# Patient Record
Sex: MALE | Race: WHITE | Employment: OTHER | URBAN - METROPOLITAN AREA
[De-identification: names, ages, dates, MRNs, and addresses within clinical notes are randomized per-mention and may not be internally consistent; named-entity substitution may affect disease eponyms.]

---

## 2020-06-22 ENCOUNTER — APPOINTMENT (OUTPATIENT)
Dept: CT IMAGING | Age: 40
DRG: 885 | End: 2020-06-22
Payer: OTHER GOVERNMENT

## 2020-06-22 ENCOUNTER — HOSPITAL ENCOUNTER (INPATIENT)
Age: 40
LOS: 7 days | Discharge: HOME | DRG: 885 | End: 2020-06-29
Attending: EMERGENCY MEDICINE | Admitting: PSYCHIATRY & NEUROLOGY
Payer: OTHER GOVERNMENT

## 2020-06-22 PROBLEM — F29 PSYCHOSIS (HCC): Status: ACTIVE | Noted: 2020-06-22

## 2020-06-22 LAB
A/G RATIO: 2.2 (ref 1.1–2.2)
ACETAMINOPHEN LEVEL: <5 UG/ML (ref 10–30)
ALBUMIN SERPL-MCNC: 5 G/DL (ref 3.4–5)
ALP BLD-CCNC: 67 U/L (ref 40–129)
ALT SERPL-CCNC: 18 U/L (ref 10–40)
AMPHETAMINE SCREEN, URINE: ABNORMAL
ANION GAP SERPL CALCULATED.3IONS-SCNC: 14 MMOL/L (ref 3–16)
AST SERPL-CCNC: 23 U/L (ref 15–37)
BARBITURATE SCREEN URINE: ABNORMAL
BASOPHILS ABSOLUTE: 0 K/UL (ref 0–0.2)
BASOPHILS RELATIVE PERCENT: 0.3 %
BENZODIAZEPINE SCREEN, URINE: ABNORMAL
BILIRUB SERPL-MCNC: 0.6 MG/DL (ref 0–1)
BUN BLDV-MCNC: 7 MG/DL (ref 7–20)
CALCIUM SERPL-MCNC: 9.5 MG/DL (ref 8.3–10.6)
CANNABINOID SCREEN URINE: POSITIVE
CHLORIDE BLD-SCNC: 99 MMOL/L (ref 99–110)
CO2: 24 MMOL/L (ref 21–32)
COCAINE METABOLITE SCREEN URINE: ABNORMAL
CREAT SERPL-MCNC: 1 MG/DL (ref 0.9–1.3)
EOSINOPHILS ABSOLUTE: 0 K/UL (ref 0–0.6)
EOSINOPHILS RELATIVE PERCENT: 0.1 %
ETHANOL: NORMAL MG/DL (ref 0–0.08)
GFR AFRICAN AMERICAN: >60
GFR NON-AFRICAN AMERICAN: >60
GLOBULIN: 2.3 G/DL
GLUCOSE BLD-MCNC: 118 MG/DL (ref 70–99)
HCT VFR BLD CALC: 46.7 % (ref 40.5–52.5)
HEMOGLOBIN: 15.8 G/DL (ref 13.5–17.5)
LYMPHOCYTES ABSOLUTE: 1.1 K/UL (ref 1–5.1)
LYMPHOCYTES RELATIVE PERCENT: 10.7 %
Lab: ABNORMAL
MCH RBC QN AUTO: 29.7 PG (ref 26–34)
MCHC RBC AUTO-ENTMCNC: 33.8 G/DL (ref 31–36)
MCV RBC AUTO: 87.9 FL (ref 80–100)
METHADONE SCREEN, URINE: ABNORMAL
MONOCYTES ABSOLUTE: 0.9 K/UL (ref 0–1.3)
MONOCYTES RELATIVE PERCENT: 8.3 %
NEUTROPHILS ABSOLUTE: 8.6 K/UL (ref 1.7–7.7)
NEUTROPHILS RELATIVE PERCENT: 80.6 %
OPIATE SCREEN URINE: ABNORMAL
OXYCODONE URINE: ABNORMAL
PDW BLD-RTO: 14 % (ref 12.4–15.4)
PH UA: 6
PHENCYCLIDINE SCREEN URINE: ABNORMAL
PLATELET # BLD: 241 K/UL (ref 135–450)
PMV BLD AUTO: 8.3 FL (ref 5–10.5)
POTASSIUM REFLEX MAGNESIUM: 3.7 MMOL/L (ref 3.5–5.1)
PROPOXYPHENE SCREEN: ABNORMAL
RBC # BLD: 5.31 M/UL (ref 4.2–5.9)
SALICYLATE, SERUM: 0.4 MG/DL (ref 15–30)
SARS-COV-2, NAAT: NOT DETECTED
SODIUM BLD-SCNC: 137 MMOL/L (ref 136–145)
TOTAL PROTEIN: 7.3 G/DL (ref 6.4–8.2)
WBC # BLD: 10.7 K/UL (ref 4–11)

## 2020-06-22 PROCEDURE — 80307 DRUG TEST PRSMV CHEM ANLYZR: CPT

## 2020-06-22 PROCEDURE — 99285 EMERGENCY DEPT VISIT HI MDM: CPT

## 2020-06-22 PROCEDURE — U0002 COVID-19 LAB TEST NON-CDC: HCPCS

## 2020-06-22 PROCEDURE — 1240000000 HC EMOTIONAL WELLNESS R&B

## 2020-06-22 PROCEDURE — 84443 ASSAY THYROID STIM HORMONE: CPT

## 2020-06-22 PROCEDURE — 84439 ASSAY OF FREE THYROXINE: CPT

## 2020-06-22 PROCEDURE — 70450 CT HEAD/BRAIN W/O DYE: CPT

## 2020-06-22 PROCEDURE — G0480 DRUG TEST DEF 1-7 CLASSES: HCPCS

## 2020-06-22 PROCEDURE — 85025 COMPLETE CBC W/AUTO DIFF WBC: CPT

## 2020-06-22 PROCEDURE — 36415 COLL VENOUS BLD VENIPUNCTURE: CPT

## 2020-06-22 PROCEDURE — 80053 COMPREHEN METABOLIC PANEL: CPT

## 2020-06-22 PROCEDURE — 6370000000 HC RX 637 (ALT 250 FOR IP): Performed by: EMERGENCY MEDICINE

## 2020-06-22 RX ORDER — OLANZAPINE 5 MG/1
10 TABLET, ORALLY DISINTEGRATING ORAL ONCE
Status: COMPLETED | OUTPATIENT
Start: 2020-06-22 | End: 2020-06-22

## 2020-06-22 RX ADMIN — OLANZAPINE 10 MG: 5 TABLET, ORALLY DISINTEGRATING ORAL at 23:18

## 2020-06-23 PROBLEM — F12.10 CANNABIS ABUSE: Status: ACTIVE | Noted: 2020-06-23

## 2020-06-23 LAB
T4 FREE: 1.6 NG/DL (ref 0.9–1.8)
TSH SERPL DL<=0.05 MIU/L-ACNC: 4.29 UIU/ML (ref 0.27–4.2)

## 2020-06-23 PROCEDURE — 99223 1ST HOSP IP/OBS HIGH 75: CPT | Performed by: PSYCHIATRY & NEUROLOGY

## 2020-06-23 PROCEDURE — 1240000000 HC EMOTIONAL WELLNESS R&B

## 2020-06-23 PROCEDURE — 6370000000 HC RX 637 (ALT 250 FOR IP): Performed by: PSYCHIATRY & NEUROLOGY

## 2020-06-23 RX ORDER — IBUPROFEN 400 MG/1
400 TABLET ORAL EVERY 6 HOURS PRN
Status: DISCONTINUED | OUTPATIENT
Start: 2020-06-23 | End: 2020-06-23

## 2020-06-23 RX ORDER — OLANZAPINE 10 MG/1
10 TABLET ORAL
Status: COMPLETED | OUTPATIENT
Start: 2020-06-23 | End: 2020-06-23

## 2020-06-23 RX ORDER — TRAZODONE HYDROCHLORIDE 50 MG/1
50 TABLET ORAL NIGHTLY PRN
Status: DISCONTINUED | OUTPATIENT
Start: 2020-06-23 | End: 2020-06-23

## 2020-06-23 RX ORDER — OLANZAPINE 10 MG/1
10 INJECTION, POWDER, LYOPHILIZED, FOR SOLUTION INTRAMUSCULAR
Status: COMPLETED | OUTPATIENT
Start: 2020-06-23 | End: 2020-06-23

## 2020-06-23 RX ORDER — ACETAMINOPHEN 325 MG/1
650 TABLET ORAL EVERY 4 HOURS PRN
Status: DISCONTINUED | OUTPATIENT
Start: 2020-06-23 | End: 2020-06-29 | Stop reason: HOSPADM

## 2020-06-23 RX ORDER — LORAZEPAM 2 MG/1
2 TABLET ORAL ONCE
Status: COMPLETED | OUTPATIENT
Start: 2020-06-23 | End: 2020-06-23

## 2020-06-23 RX ORDER — MAGNESIUM HYDROXIDE/ALUMINUM HYDROXICE/SIMETHICONE 120; 1200; 1200 MG/30ML; MG/30ML; MG/30ML
30 SUSPENSION ORAL EVERY 6 HOURS PRN
Status: DISCONTINUED | OUTPATIENT
Start: 2020-06-23 | End: 2020-06-29 | Stop reason: HOSPADM

## 2020-06-23 RX ORDER — BENZTROPINE MESYLATE 1 MG/ML
2 INJECTION INTRAMUSCULAR; INTRAVENOUS 2 TIMES DAILY PRN
Status: DISCONTINUED | OUTPATIENT
Start: 2020-06-23 | End: 2020-06-29 | Stop reason: HOSPADM

## 2020-06-23 RX ORDER — LORAZEPAM 2 MG/1
2 TABLET ORAL EVERY 6 HOURS PRN
Status: DISCONTINUED | OUTPATIENT
Start: 2020-06-23 | End: 2020-06-29 | Stop reason: HOSPADM

## 2020-06-23 RX ORDER — ACETAMINOPHEN 325 MG/1
650 TABLET ORAL EVERY 4 HOURS PRN
Status: DISCONTINUED | OUTPATIENT
Start: 2020-06-23 | End: 2020-06-23

## 2020-06-23 RX ORDER — OLANZAPINE 10 MG/1
10 INJECTION, POWDER, LYOPHILIZED, FOR SOLUTION INTRAMUSCULAR 2 TIMES DAILY PRN
Status: DISCONTINUED | OUTPATIENT
Start: 2020-06-23 | End: 2020-06-29 | Stop reason: HOSPADM

## 2020-06-23 RX ORDER — OLANZAPINE 10 MG/1
10 TABLET ORAL EVERY 8 HOURS PRN
Status: DISCONTINUED | OUTPATIENT
Start: 2020-06-23 | End: 2020-06-29 | Stop reason: HOSPADM

## 2020-06-23 RX ORDER — PALIPERIDONE 3 MG/1
6 TABLET, EXTENDED RELEASE ORAL DAILY
Status: DISCONTINUED | OUTPATIENT
Start: 2020-06-23 | End: 2020-06-24

## 2020-06-23 RX ADMIN — PALIPERIDONE 6 MG: 3 TABLET, EXTENDED RELEASE ORAL at 15:20

## 2020-06-23 RX ADMIN — LORAZEPAM 2 MG: 2 TABLET ORAL at 15:20

## 2020-06-23 RX ADMIN — OLANZAPINE 10 MG: 10 TABLET, FILM COATED ORAL at 12:07

## 2020-06-23 ASSESSMENT — SLEEP AND FATIGUE QUESTIONNAIRES
RESTFUL SLEEP: NO
DO YOU HAVE DIFFICULTY SLEEPING: YES
SLEEP PATTERN: INSOMNIA
DIFFICULTY FALLING ASLEEP: YES
DIFFICULTY ARISING: NO
DIFFICULTY STAYING ASLEEP: YES
DO YOU USE A SLEEP AID: NO

## 2020-06-23 ASSESSMENT — LIFESTYLE VARIABLES: HISTORY_ALCOHOL_USE: YES

## 2020-06-23 ASSESSMENT — PAIN SCALES - GENERAL
PAINLEVEL_OUTOF10: 0
PAINLEVEL_OUTOF10: 0

## 2020-06-23 ASSESSMENT — PATIENT HEALTH QUESTIONNAIRE - PHQ9: SUM OF ALL RESPONSES TO PHQ QUESTIONS 1-9: 13

## 2020-06-23 NOTE — ED NOTES
Brought to the [facility] AN AFFILIATE OF [facility], patient is dressed in safety gown. Blood and urine specimens obtained and sent to lab for processing. Writer spoke with patient, patient with inappropriate laughter. He is, at times, able to answer questions about  background. Talks about, \"TJ\" as a person he, \"fears\", will look over to his right when he mentions TJ. Unable to obtain assessment due to patient is unwilling/unable to participate in such due to current mental status. States he is unaware of where he is. Denies he has any children, life partner, siblings, parents. Laughs at the beginning of question and after answering. Able to follow simple directions, though at times does require repeated requests. Patient denies that he wishes to harm self. He is unwilling/unable to provide any information about family contacts. Patient currently monitored for safety.      Gilda Dalton RN  06/22/20 8503       Gilda Dalton [facility]  06/22/20 1331

## 2020-06-23 NOTE — PROGRESS NOTES
Pt arrived to unit via wheelchair with security and [facility] AN AFFILIATE OF [facility] staff. No issues. Pt sits down and closes eyes. No s/s of distress. V.s.s. admission questions started.

## 2020-06-23 NOTE — PROGRESS NOTES
Recognizing danger situations (included triggers and roadblocks)                    ( )  Coping skills (new ways to manage stress, exercise, relaxation techniques, changing routine, distraction)                                                           ( )  Basic information about quitting (benefits of quitting, techniques in how to quit, available resources  ( ) Referral for counseling faxed to [facility]                                           ( ) Patient refused counseling  ( ) Patient has not smoked in the last 30 days    Metabolic Screening:    No results found for: LABA1C    No results found for: CHOL  No results found for: TRIG  No results found for: HDL  No components found for: LDLCAL  No results found for: LABVLDL      Body mass index is 30.26 kg/m². BP Readings from Last 2 Encounters:   06/23/20 (!) 146/80           Pt admitted with followings belongings:  Dentures: None  Vision - Corrective Lenses: None  Hearing Aid: None  Jewelry: None  Body Piercings Removed: No  Clothing: Pants, Footwear, Socks(belt)  Were All Patient Medications Collected?: Not Applicable  Other Valuables: [address] locker. .  Patient oriented to surroundings and program expectations and copy of patient rights given.  Received admission packet:  Consents reviewed, Patient unable to verbalize understanding   Patient education on precautions                 Chelsea Bangrua RN
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
## 2020-06-23 NOTE — ED PROVIDER NOTES
Patient has no known allergies. FAMILY HISTORY     History reviewed. No pertinent family history. SOCIAL HISTORY       Social History     Socioeconomic History    Marital status: Unknown     Spouse name: None    Number of children: None    Years of education: None    Highest education level: None   Occupational History    None   Social Needs    Financial resource strain: None    Food insecurity     Worry: None     Inability: None    Transportation needs     Medical: None     Non-medical: None   Tobacco Use    Smoking status: Never Smoker    Smokeless tobacco: Never Used    Tobacco comment: ROSALINO   Substance and Sexual Activity    Alcohol use: Not Currently     Comment: ROSALINO    Drug use: Not Currently     Comment: ROSALINO    Sexual activity: None     Comment: ROSALINO   Lifestyle    Physical activity     Days per week: None     Minutes per session: None    Stress: None   Relationships    Social connections     Talks on phone: None     Gets together: None     Attends Episcopalian service: None     Active member of club or organization: None     Attends meetings of clubs or organizations: None     Relationship status: None    Intimate partner violence     Fear of current or ex partner: None     Emotionally abused: None     Physically abused: None     Forced sexual activity: None   Other Topics Concern    None   Social History Narrative    None       SCREENINGS               PHYSICAL EXAM    (up to 7 for level 4, 8 or more for level 5)     ED Triage Vitals [06/22/20 2018]   BP Temp Temp Source Pulse Resp SpO2 Height Weight   (!) 132/109 99.5 °F (37.5 °C) Oral 110 16 98 % 5' 11.5\" (1.816 m) 220 lb (99.8 kg)       Physical Exam    General appearance: Lying in bed laughing and appropriately  Skin:  Warm. Dry. Eye:  Extraocular movements intact. Pupils are equally round and reactive to light and accommodation. Extraocular motions are intact. CN II-XII intact.   Ears, nose, mouth and throat:  Oral mucosa

## 2020-06-23 NOTE — BH NOTE
Pt sitting on the floor with his back against wall with eyes closed and rocking gently. Would not respond to writer. Informed nurse, will continue to monitor.
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
## 2020-06-24 PROBLEM — F31.13 BIPOLAR I DISORDER, CURRENT OR MOST RECENT EPISODE MANIC, SEVERE (HCC): Status: ACTIVE | Noted: 2020-06-22

## 2020-06-24 LAB
CHOLESTEROL, TOTAL: 116 MG/DL (ref 0–199)
ESTIMATED AVERAGE GLUCOSE: 108.3 MG/DL
HBA1C MFR BLD: 5.4 %
HDLC SERPL-MCNC: 41 MG/DL (ref 40–60)
LDL CHOLESTEROL CALCULATED: 57 MG/DL
TRIGL SERPL-MCNC: 92 MG/DL (ref 0–150)
VLDLC SERPL CALC-MCNC: 18 MG/DL

## 2020-06-24 PROCEDURE — 80061 LIPID PANEL: CPT

## 2020-06-24 PROCEDURE — 99221 1ST HOSP IP/OBS SF/LOW 40: CPT | Performed by: PHYSICIAN ASSISTANT

## 2020-06-24 PROCEDURE — 36415 COLL VENOUS BLD VENIPUNCTURE: CPT

## 2020-06-24 PROCEDURE — 97535 SELF CARE MNGMENT TRAINING: CPT

## 2020-06-24 PROCEDURE — 83036 HEMOGLOBIN GLYCOSYLATED A1C: CPT

## 2020-06-24 PROCEDURE — 1240000000 HC EMOTIONAL WELLNESS R&B

## 2020-06-24 PROCEDURE — 99233 SBSQ HOSP IP/OBS HIGH 50: CPT | Performed by: PSYCHIATRY & NEUROLOGY

## 2020-06-24 PROCEDURE — 6370000000 HC RX 637 (ALT 250 FOR IP): Performed by: PSYCHIATRY & NEUROLOGY

## 2020-06-24 PROCEDURE — 97165 OT EVAL LOW COMPLEX 30 MIN: CPT

## 2020-06-24 RX ORDER — LITHIUM CARBONATE 450 MG
450 TABLET, EXTENDED RELEASE ORAL EVERY 12 HOURS SCHEDULED
Status: DISCONTINUED | OUTPATIENT
Start: 2020-06-24 | End: 2020-06-29 | Stop reason: HOSPADM

## 2020-06-24 RX ORDER — LITHIUM CARBONATE 450 MG
450 TABLET, EXTENDED RELEASE ORAL EVERY 12 HOURS SCHEDULED
Status: DISCONTINUED | OUTPATIENT
Start: 2020-06-24 | End: 2020-06-24

## 2020-06-24 RX ORDER — QUETIAPINE 200 MG/1
200 TABLET, FILM COATED, EXTENDED RELEASE ORAL NIGHTLY
Status: DISCONTINUED | OUTPATIENT
Start: 2020-06-24 | End: 2020-06-29 | Stop reason: HOSPADM

## 2020-06-24 RX ADMIN — PALIPERIDONE 6 MG: 3 TABLET, EXTENDED RELEASE ORAL at 07:38

## 2020-06-24 ASSESSMENT — PAIN SCALES - GENERAL
PAINLEVEL_OUTOF10: 0
PAINLEVEL_OUTOF10: 0

## 2020-06-24 NOTE — PROGRESS NOTES
PCP, Psychiatrist, or therapist.  Social Network:  Couple of friends  Stressors:  1. People not doing what they are suppose to. \"When the Mosque doesn't do what they are suppose to do it stresses me out. \"  2.  People on placing honor. 3.  Not being able to feel loved. Coping Skills:  Writing, singing    Pain  []Yes  [x]No  Rating:  Location:  Pain Medicine Status: [] Denies need  [] Pain med requested  [] RN notified. Cognition    A&Ox person, place, date. Disoriented to why he's in the hospital.  Patient cooperative. Follows []1 step and [x] 3 step commands. Decreased insight into limitations. Decreased awareness of errors. Decreased safety awareness. Upper Extremity ROM:    WFL, pt able to perform all bed mobility, transfers, and gait without ROM limitation. Upper Extremity Strength:    WFL, pt able to perform all bed mobility, transfers, and gait without strength limitation. Upper Extremity Sensation:    WFL    Upper Extremity Proprioception:    No apparent deficits. Skin Integrity:  WFL     Coordination and Tone:  WFL    Balance  Static Sitting:  [x] Good [] Fair [] Poor  Dynamic Sitting:  [x] Good [] Fair [] Poor  Static Standing: [x] Good [] Fair [] Poor  Dynamic Standing: [x] Good [] Fair [] Poor    Bed mobility:  Independent  Supine to sit:  Sit to supine:  Scooting to head of bed:  Scooting in sitting:  Rolling:  Bridging:    Transfers:   Independent  Sit to stand:  Stand to sit:  Bed/Chair to/from toilet:    Self Care: Toileting:  Independent  Grooming:    Dressing:    Exercise / Activities Initiated:   Pt. Educated on role of OT. Pt. Participated in:  Eval, ADL retraining, medication management, education on how to control the water for showering/washing hands, and sleep hygiene handouts/education. Assessment of Deficits:   Pt demonstrated decreased activity tolerance, decreased safety awareness, decreased cognition and decreased ADL/IADL status.     Pt. Limited during evaluation by decreased cognition. At end of evaluation, pt. In room. Goal(s) : To be met in 3 Visits:  1). Pt. To complete ACLS. 2). Pt. To verbalize understanding of sleep hygiene education. To be met in 5 Visits:  1). Pt. To complete 1 SMART long term goal and 2 SMART short term goals with mod assist.  2). Pt. To verbalize 3 new coping skills. 3). Pt. To complete interest check list.    4). Pt. To verbalize understanding of 3 communication styles. 5). Pt. To complete wellness plan. 6). Pt. To complete a daily schedule of healthy activities/routines with mod assist.   7). Pt. To identify 2 memory strategies to take medications as prescribed. Rehabilitation Potential:  Good for goals listed above. Strengths for achieving goals include:  PLOF  Barriers to achieving goals include:  Decreased Cognition     Plan: To be seen 2-5x/week while in acute care setting for therapeutic exercises/act, ADL retraining, NMR and patient/caregiver ed/instruction.      Timed Code Treatment Minutes:   24  minutes    Total Treatment Time:    34  minutes    Signature and License Number    OLVIN Perez/L  #[id number]        If patient discharges from this facility prior to next visit, this note will serve as the Discharge Summary

## 2020-06-24 NOTE — GROUP NOTE
Group Therapy Note    Date: 6/24/2020    Group Start Time: 1000  Group End Time: 7320  Group Topic: [address]        Group Therapy Note    Attendees: 11    Patient's Goal: to engage in Mood Elevation intervention by actively listening to music and expressing reactions to songs utilized to elevate mood, increase positive coping skills, increase emotional awareness, and increase mood regulation. Notes: Alfred Oro actively participated in group while in attendance. Pt expressed reactions to music utilized. Alfred Oro was pulled from group early and did not return. Status After Intervention:  Unchanged    Participation Level:  Active Listener and Interactive    Participation Quality: Appropriate, Attentive and Sharing      Speech:  normal      Thought Process/Content: Linear      Affective Functioning: Constricted/Restricted      Mood: anxious and depressed      Level of consciousness:  Alert and Attentive      Response to Learning: Able to verbalize current knowledge/experience and Progressing to goal      Endings: None Reported    Modes of Intervention: Education, Support, Clarifying, Problem-solving, Activity and Media      Discipline Responsible: Psychoeducational Specialist      Signature:  TINA Johnson

## 2020-06-25 PROCEDURE — 97530 THERAPEUTIC ACTIVITIES: CPT

## 2020-06-25 PROCEDURE — 99233 SBSQ HOSP IP/OBS HIGH 50: CPT | Performed by: PSYCHIATRY & NEUROLOGY

## 2020-06-25 PROCEDURE — 6370000000 HC RX 637 (ALT 250 FOR IP): Performed by: PSYCHIATRY & NEUROLOGY

## 2020-06-25 PROCEDURE — 1240000000 HC EMOTIONAL WELLNESS R&B

## 2020-06-25 PROCEDURE — 97535 SELF CARE MNGMENT TRAINING: CPT

## 2020-06-25 RX ADMIN — QUETIAPINE FUMARATE 200 MG: 200 TABLET, EXTENDED RELEASE ORAL at 21:46

## 2020-06-25 RX ADMIN — LITHIUM CARBONATE 450 MG: 450 TABLET ORAL at 21:47

## 2020-06-25 RX ADMIN — LITHIUM CARBONATE 450 MG: 450 TABLET ORAL at 08:38

## 2020-06-25 ASSESSMENT — PAIN SCALES - GENERAL
PAINLEVEL_OUTOF10: 0
PAINLEVEL_OUTOF10: 0

## 2020-06-25 NOTE — BH NOTE
[address] [facility]  Day 3 Interdisciplinary Treatment Plan NOTE    Review Date & Time: 06/25/2020 0900    Patient was not in treatment team    Admission Type:   Admission Type: Probate    Reason for admission:     Estimated Length of Stay Update:  1-3 days   Estimated Discharge Date Update: 1-3 days     PATIENT STRENGTHS:  Patient Strengths Strengths: (ROSALINO just smiled when therapist asked him the question)  Patient Strengths and Limitations:Limitations: (ROSALINO just smiled when therapist asked him the question)  Addictive Behavior:Addictive Behavior  In the past 3 months, have you felt or has someone told you that you have a problem with:  : (ROSALINO just smiled when therapist asked him the question)  Do you have a history of Chemical Use?: (ROSALINO )  Do you have a history of Alcohol Use?: (ROSALINO )  Do you have a history of Street Drug Abuse?: (ROSALINO )  Histroy of Prescripton Drug Abuse?: (ROSALINO)  Medical Problems:History reviewed. No pertinent past medical history. Risk:  Fall RiskTotal: 53  Lloyd Scale Lloyd Scale Score: 22  BVC Total: 1  Change in scores NO.  Changes to plan of Care  NO    Status EXAM:   Status and Exam  Normal: No  Facial Expression: Exaggerated  Affect: Inappropriate, Unstable, Constricted  Level of Consciousness: Alert  Mood:Normal: No  Mood: Anxious, Labile, Suspicious, Irritable  Motor Activity:Normal: Yes  Motor Activity: Decreased  Interview Behavior: Impulsive, Irritable, Uncooperative/Withdrawn  Preception: Henderson to Person, Henderson to Time, Henderson to Place, Henderson to Situation  Attention:Normal: No  Attention: Hyperalert  Thought Processes: Circumstantial  Thought Content:Normal: No  Thought Content: Paranoia  Hallucinations: None, Other (Comment)(denies)  Delusions: Yes  Delusions: Persecution, Other(See Comment)(Sikh preoccupation.)  Memory:Normal: No  Memory: Poor Remote, Poor Recent  Insight and Judgment: No  Insight and Judgment: Poor Insight, Poor Judgment  Present Suicidal Ideation: No  Present Homicidal Ideation: No    Daily Assessment Last Entry:   Daily Sleep (WDL): Exceptions to WDL         Patient Currently in Pain: Denies  Daily Nutrition (WDL): Within Defined Limits    Patient Monitoring:  Frequency of Checks: 4 times per hour, close    Psychiatric Symptoms:   Depression Symptoms  Depression Symptoms: Isolative, Loss of interest, Increased irritability  Anxiety Symptoms  Anxiety Symptoms: Generalized, Obsessions  Antonia Symptoms  Antonia Symptoms: Poor judgment     Psychosis Symptoms  Delusion Type: Paranoid, Jainism    Suicide Risk CSSR-S:  1) Within the past month, have you wished you were dead or wished you could go to sleep and not wake up? : No  2) Have you actually had any thoughts of killing yourself? : No  6) Have you ever done anything, started to do anything, or prepared to do anything to end your life?: No  Change in Result NO Change in Plan of care NO      EDUCATION:   Learner Progress Toward Treatment Goals: Reviewed results and recommendations of this team    Method: Individual    Outcome: Verbalized understanding    PATIENT GOALS: \" to go home\"     PLAN/TREATMENT RECOMMENDATIONS UPDATE: maintain safety, medication management     GOALS UPDATE:   Time frame for Short-Term Goals:  By time of discharge       Emil Cedeno RN

## 2020-06-25 NOTE — GROUP NOTE
Group Therapy Note    Date: 6/25/2020    Group Start Time: 1000  Group End Time: 1100  [facility]        Group Therapy Note    Attendees: 10         Patient's Goal:  Patient will complete worksheet on Dependency and will discuss how over-reliance on others' approval effects mental health. Notes:  Patient attended group. Completed the worksheet and discussed in group. Talked about his tendency to seek approval of others at the expense of patients own values and beliefs. Gave an example of how this effected mental health. Received feedback from peers. Status After Intervention:  Improved    Participation Level:  Active Listener and Interactive    Participation Quality: Appropriate and Attentive    Speech:  normal    Thought Process/Content: Logical    Affective Functioning: Congruent    Mood: anxious, depressed     Level of consciousness:  Oriented x4    Response to Learning: Able to verbalize current knowledge/experience and Able to verbalize/acknowledge new learning    Endings: None Reported    Modes of Intervention: Education, Support, Socialization and Exploration    Discipline Responsible: /Counselor    Signature:  Bennett Waldron [facility]

## 2020-06-26 PROCEDURE — 6370000000 HC RX 637 (ALT 250 FOR IP): Performed by: PSYCHIATRY & NEUROLOGY

## 2020-06-26 PROCEDURE — 1240000000 HC EMOTIONAL WELLNESS R&B

## 2020-06-26 PROCEDURE — 99233 SBSQ HOSP IP/OBS HIGH 50: CPT | Performed by: PSYCHIATRY & NEUROLOGY

## 2020-06-26 RX ADMIN — LITHIUM CARBONATE 450 MG: 450 TABLET ORAL at 21:11

## 2020-06-26 RX ADMIN — LITHIUM CARBONATE 450 MG: 450 TABLET ORAL at 08:37

## 2020-06-26 RX ADMIN — QUETIAPINE FUMARATE 200 MG: 200 TABLET, EXTENDED RELEASE ORAL at 21:10

## 2020-06-26 NOTE — GROUP NOTE
Group Therapy Note    Date: 6/25/2020    Group Start Time: 2050  Group End Time: 2115  Group Topic: Franchesca Vega, RN        Group Therapy Note    Attendees: Spoke about goals and rules for the unit. Patient's Goal:  To read thirty pages. Notes:  Pleasant and cooperative during group    Status After Intervention:  Unchanged    Participation Level:  Active Listener and Interactive    Participation Quality: Appropriate, Attentive and Sharing      Speech:  normal      Thought Process/Content: Logical  Linear      Affective Functioning: Incongruent      Mood: anxious      Level of consciousness:  Preoccupied      Response to Learning: Able to verbalize current knowledge/experience, Able to verbalize/acknowledge new learning and Able to retain information      Endings: None Reported    Modes of Intervention: Education, Support and Socialization      Discipline Responsible: Registered Nurse      Signature:  Jason Billingsley RN

## 2020-06-26 NOTE — PROGRESS NOTES
presents with severe psychosis  including disorganized thinking, agitation, paranoia, delusional  thought, and likely hallucinations as evidenced by response to internal  stimuli. His insight and judgment are impaired. He requires inpatient  stabilization and treatment. Given his presentation on the unit and further history, he mostly likely has Bipolar I disorder. Principal Problem:    Bipolar I disorder, current or most recent episode manic, severe ([address]  Active Problems:    Cannabis abuse    Psychosis ([facility] [address]  Resolved Problems:    * No resolved hospital problems. *     PLAN:  1. Inpatient admission for stabilization and treatment. 2.  On admission, started Invega 6 mg p.o. daily for treatment of psychosis. Ordered  every 15-minute checks for safety, programming, and p.r.n. medication  for anxiety, agitation and insomnia. 6/24/2020 - DC invega. Start Lithium 450 mg BID and Quetiapine 200mg QHS for mood stabilization. 3.  Internal Medicine consult for admission. No additional findings. 4.  Collateral information if possible for diagnostic clarification and  care coordination. 5.  The patient was admitted on a Statement of Belief for stabilization and placed on a hold. His hold expires Friday. Total time with patient was 35 minutes and more than 50 % of that time was spent counseling the patient on their symptoms, treatment, and expected goals.        Danitza Daniels MD, [facility] [address] [place],Third Floor, [place]

## 2020-06-26 NOTE — GROUP NOTE
Group Therapy Note    Date: 6/26/2020    Group Start Time: 5250  Group End Time: 6696  Group Topic: Healthy Living/Wellness    [address] [place] [address]        Group Therapy Note    Attendees: 8    Patient's Goal: to engage in a [employer] Class to improve self-care, mood, self-esteem, and overall health, wellness, and quality of life. To complete a self-care assessment worksheet and identify ways to improve overall wellness. Notes: Felisha Jett actively engaged in a [employer] Class and completed all exercise directives. Felisha Jett completed a self-care assessment worksheet. Felisha Jett actively listened and contributed to processing discussion. Status After Intervention:  Improved    Participation Level:  Active Listener and Interactive    Participation Quality: Appropriate, Attentive, Sharing and Supportive      Speech:  normal      Thought Process/Content: Logical      Affective Functioning: Congruent      Mood: euthymic      Level of consciousness:  Alert, Oriented x4 and Attentive      Response to Learning: Able to verbalize current knowledge/experience, Able to verbalize/acknowledge new learning and Progressing to goal      Endings: None Reported    Modes of Intervention: Education, Support, Socialization, Exploration, Clarifying, Problem-solving, Activity and Movement      Discipline Responsible: Psychoeducational Specialist      Signature:  Yasmine Rodgers [place]

## 2020-06-27 PROCEDURE — 6370000000 HC RX 637 (ALT 250 FOR IP): Performed by: PSYCHIATRY & NEUROLOGY

## 2020-06-27 PROCEDURE — 1240000000 HC EMOTIONAL WELLNESS R&B

## 2020-06-27 RX ORDER — PANTOPRAZOLE SODIUM 40 MG/1
40 TABLET, DELAYED RELEASE ORAL
Status: DISCONTINUED | OUTPATIENT
Start: 2020-06-27 | End: 2020-06-29 | Stop reason: HOSPADM

## 2020-06-27 RX ADMIN — LITHIUM CARBONATE 450 MG: 450 TABLET ORAL at 20:15

## 2020-06-27 RX ADMIN — QUETIAPINE FUMARATE 200 MG: 200 TABLET, EXTENDED RELEASE ORAL at 20:15

## 2020-06-27 RX ADMIN — ACETAMINOPHEN 650 MG: 325 TABLET ORAL at 08:59

## 2020-06-27 RX ADMIN — PANTOPRAZOLE SODIUM 40 MG: 40 TABLET, DELAYED RELEASE ORAL at 16:42

## 2020-06-27 RX ADMIN — LITHIUM CARBONATE 450 MG: 450 TABLET ORAL at 08:56

## 2020-06-27 ASSESSMENT — PAIN SCALES - GENERAL: PAINLEVEL_OUTOF10: 4

## 2020-06-27 NOTE — PROGRESS NOTES
Comments: + interactions, + contribution, and + engagement.   Met goal       Time: 0839-2002      Type of Group: wrap-up/relaxation      Level of Participation: 5/16

## 2020-06-28 PROCEDURE — 6370000000 HC RX 637 (ALT 250 FOR IP): Performed by: PSYCHIATRY & NEUROLOGY

## 2020-06-28 PROCEDURE — 99222 1ST HOSP IP/OBS MODERATE 55: CPT | Performed by: PSYCHIATRY & NEUROLOGY

## 2020-06-28 PROCEDURE — 1240000000 HC EMOTIONAL WELLNESS R&B

## 2020-06-28 RX ADMIN — QUETIAPINE FUMARATE 200 MG: 200 TABLET, EXTENDED RELEASE ORAL at 20:45

## 2020-06-28 RX ADMIN — PANTOPRAZOLE SODIUM 40 MG: 40 TABLET, DELAYED RELEASE ORAL at 16:36

## 2020-06-28 RX ADMIN — PANTOPRAZOLE SODIUM 40 MG: 40 TABLET, DELAYED RELEASE ORAL at 06:47

## 2020-06-28 RX ADMIN — LITHIUM CARBONATE 450 MG: 450 TABLET ORAL at 08:59

## 2020-06-28 RX ADMIN — LITHIUM CARBONATE 450 MG: 450 TABLET ORAL at 20:45

## 2020-06-28 NOTE — PLAN OF CARE
Problem: Anger Management/Homicidal Ideation:  Goal: Absence of angry outbursts  Description: Absence of angry outbursts  Outcome: Ongoing   Patient isolated to room and bed this shift sleeping at start of shift. Unable to assess as patient would not participate with interview. Patient just pulled covers over head and rolled away from writer when trying to engage. Patient ad no angry outburst entire shift. Will continue to monitor.
Pt is more visible today and is less paranoid. He is smiling and laughing appropriately. He denies any SI/HI/AVH/ He is medication compliant and able to make needs known to staff. He is attending and participating appropriately in groups. Will continue to monitor.   Zuleyka VAZQUEZN, RN-BC
\"TJ\" has been has been intermittently visible on the unit. He is pleasant and cooperative. Denies SI/HI/AVH. Endorses feeling depressed, rated a 4/10. Denies feeling anxious. States he \"did something stupid\" and \"scared people\" by [employer] like a lunatic off the [facility]. \" He was compliant with HS meds. Denies needs at this time. Will continue to monitor.
Able to verbalize support systems  Description: Able to verbalize support systems  Outcome: Ongoing  Goal: Absence of self-harm  Description: Absence of self-harm  Outcome: Ongoing  Goal: Patient specific goal  Description: Patient specific goal  Outcome: Ongoing  Goal: Participates in care planning  Description: Participates in care planning  Outcome: Ongoing   Patient is encouraged to establish daily goals while on the unit. Goal today was to read 30 pages in  his book. He was successful with this goal and is asked to think about goals for tomorrow.
doctor about it in the morning. Problem: Pain:  Goal: Pain level will decrease  Description: Pain level will decrease  Outcome: Ongoing  Goal: Control of acute pain  Description: Control of acute pain  Outcome: Ongoing  Goal: Control of chronic pain  Description: Control of chronic pain  Outcome: Ongoing   Patient denied any complaint of pain this evening. He is asked to come to the staff if any pain issues arise.

## 2020-06-29 VITALS
TEMPERATURE: 97.9 F | HEIGHT: 72 IN | WEIGHT: 220 LBS | OXYGEN SATURATION: 98 % | HEART RATE: 66 BPM | DIASTOLIC BLOOD PRESSURE: 77 MMHG | RESPIRATION RATE: 16 BRPM | BODY MASS INDEX: 29.8 KG/M2 | SYSTOLIC BLOOD PRESSURE: 138 MMHG

## 2020-06-29 LAB
ANION GAP SERPL CALCULATED.3IONS-SCNC: 10 MMOL/L (ref 3–16)
BUN BLDV-MCNC: 11 MG/DL (ref 7–20)
CALCIUM SERPL-MCNC: 9.2 MG/DL (ref 8.3–10.6)
CHLORIDE BLD-SCNC: 97 MMOL/L (ref 99–110)
CO2: 28 MMOL/L (ref 21–32)
CREAT SERPL-MCNC: 0.9 MG/DL (ref 0.9–1.3)
GFR AFRICAN AMERICAN: >60
GFR NON-AFRICAN AMERICAN: >60
GLUCOSE BLD-MCNC: 99 MG/DL (ref 70–99)
LITHIUM DOSE AMOUNT: NORMAL
LITHIUM LEVEL: 0.7 MMOL/L (ref 0.6–1.2)
POTASSIUM SERPL-SCNC: 4 MMOL/L (ref 3.5–5.1)
SODIUM BLD-SCNC: 135 MMOL/L (ref 136–145)

## 2020-06-29 PROCEDURE — 80048 BASIC METABOLIC PNL TOTAL CA: CPT

## 2020-06-29 PROCEDURE — 5130000000 HC BRIDGE APPOINTMENT

## 2020-06-29 PROCEDURE — 36415 COLL VENOUS BLD VENIPUNCTURE: CPT

## 2020-06-29 PROCEDURE — 6370000000 HC RX 637 (ALT 250 FOR IP): Performed by: PSYCHIATRY & NEUROLOGY

## 2020-06-29 PROCEDURE — 99239 HOSP IP/OBS DSCHRG MGMT >30: CPT | Performed by: PSYCHIATRY & NEUROLOGY

## 2020-06-29 PROCEDURE — 80178 ASSAY OF LITHIUM: CPT

## 2020-06-29 RX ORDER — QUETIAPINE 200 MG/1
200 TABLET, FILM COATED, EXTENDED RELEASE ORAL NIGHTLY
Qty: 5 TABLET | Refills: 0 | Status: SHIPPED | OUTPATIENT
Start: 2020-06-29 | End: 2020-07-16 | Stop reason: HOSPADM

## 2020-06-29 RX ORDER — LITHIUM CARBONATE 450 MG
450 TABLET, EXTENDED RELEASE ORAL EVERY 12 HOURS SCHEDULED
Qty: 60 TABLET | Refills: 0 | Status: SHIPPED | OUTPATIENT
Start: 2020-06-29 | End: 2020-07-16 | Stop reason: HOSPADM

## 2020-06-29 RX ADMIN — LITHIUM CARBONATE 450 MG: 450 TABLET ORAL at 08:03

## 2020-06-29 RX ADMIN — PANTOPRAZOLE SODIUM 40 MG: 40 TABLET, DELAYED RELEASE ORAL at 06:37

## 2020-06-29 NOTE — GROUP NOTE
Group Therapy Note    Date: 6/29/2020    Group Start Time: 1115  Group End Time: 9943  Group Topic: Recovery    [facility]    Luisa Mccann [employer]        Group Therapy Note    Attendees: 8        Patient's Goal:  Patient will complete worksheet on boundaries and will discuss how they apply to mental wellbeing. Notes:  Patient attended group. Completed the worksheet and discussed in group. Verbalized a basic understanding of boundaries and talked about problems he has had in the past with poor boundaries. Status After Intervention:  Improved    Participation Level:  Active Listener and Interactive    Participation Quality: Appropriate and Attentive    Speech:  normal    Thought Process/Content: Logical    Affective Functioning: Congruent    Mood: anxious, depressed     Level of consciousness:  Oriented x4    Response to Learning: Able to verbalize current knowledge/experience and Able to verbalize/acknowledge new learning    Endings: None Reported    Modes of Intervention: Education, Support, Socialization and Exploration    Discipline Responsible: /Counselor    Signature:  Luisa Mccann [facility] [employer]

## 2020-07-03 ENCOUNTER — HOSPITAL ENCOUNTER (OUTPATIENT)
Dept: PSYCHIATRY | Age: 40
Setting detail: THERAPIES SERIES
Discharge: HOME | End: 2020-07-03
Payer: OTHER GOVERNMENT

## 2020-07-03 VITALS
RESPIRATION RATE: 16 BRPM | TEMPERATURE: 97.2 F | DIASTOLIC BLOOD PRESSURE: 78 MMHG | HEART RATE: 55 BPM | SYSTOLIC BLOOD PRESSURE: 138 MMHG

## 2020-07-03 ASSESSMENT — SLEEP AND FATIGUE QUESTIONNAIRES
DIFFICULTY FALLING ASLEEP: YES
AVERAGE NUMBER OF SLEEP HOURS: 8
SLEEP PATTERN: DIFFICULTY FALLING ASLEEP;RESTLESSNESS;DISTURBED/INTERRUPTED SLEEP
DIFFICULTY STAYING ASLEEP: YES
DO YOU USE A SLEEP AID: YES
DO YOU HAVE DIFFICULTY SLEEPING: NO
DIFFICULTY ARISING: YES
RESTFUL SLEEP: NO

## 2020-07-03 ASSESSMENT — PATIENT HEALTH QUESTIONNAIRE - PHQ9: SUM OF ALL RESPONSES TO PHQ QUESTIONS 1-9: 9

## 2020-07-03 ASSESSMENT — ANXIETY QUESTIONNAIRES
3. WORRYING TOO MUCH ABOUT DIFFERENT THINGS: 3-NEARLY EVERY DAY
6. BECOMING EASILY ANNOYED OR IRRITABLE: 1-SEVERAL DAYS
5. BEING SO RESTLESS THAT IT IS HARD TO SIT STILL: 3-NEARLY EVERY DAY
1. FEELING NERVOUS, ANXIOUS, OR ON EDGE: 3-NEARLY EVERY DAY
2. NOT BEING ABLE TO STOP OR CONTROL WORRYING: 2-OVER HALF THE DAYS
7. FEELING AFRAID AS IF SOMETHING AWFUL MIGHT HAPPEN: 3-NEARLY EVERY DAY
4. TROUBLE RELAXING: 3-NEARLY EVERY DAY
GAD7 TOTAL SCORE: 18

## 2020-07-03 ASSESSMENT — LIFESTYLE VARIABLES: HISTORY_ALCOHOL_USE: NO
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
## 2020-07-07 ENCOUNTER — HOSPITAL ENCOUNTER (OUTPATIENT)
Dept: PSYCHIATRY | Age: 40
Setting detail: THERAPIES SERIES
Discharge: HOME | End: 2020-07-07
Payer: OTHER GOVERNMENT

## 2020-07-07 VITALS — TEMPERATURE: 98.1 F

## 2020-07-07 VITALS — DIASTOLIC BLOOD PRESSURE: 72 MMHG | HEART RATE: 75 BPM | SYSTOLIC BLOOD PRESSURE: 116 MMHG | RESPIRATION RATE: 18 BRPM

## 2020-07-07 PROCEDURE — 90853 GROUP PSYCHOTHERAPY: CPT | Performed by: COUNSELOR

## 2020-07-07 PROCEDURE — 90792 PSYCH DIAG EVAL W/MED SRVCS: CPT | Performed by: PSYCHIATRY & NEUROLOGY

## 2020-07-07 PROCEDURE — 90853 GROUP PSYCHOTHERAPY: CPT

## 2020-07-07 NOTE — GROUP NOTE
Group Therapy Note    Date: 7/7/2020    Group Start Time: 10:00 AM  Group End Time: 11:00 AM  Group Topic: Recovery    [facility]    [address]        Group Therapy Note    Attendees: 5                  Patient's Goal:  Patient will complete worksheet on humility. Will identify how humility contributes to overall happiness. Will explore ways to cultivate humility. Notes:  Patient attended group. Completed the worksheet and discussed. Talked about how humility contributes to happiness. Patient identified activities to do that will cultivate humility in life. Status After Intervention:  Improved    Participation Level:  Active Listener and Interactive    Participation Quality: Appropriate, Attentive, Sharing and Supportive      Speech:  normal      Thought Process/Content: Logical      Affective Functioning: Congruent      Mood: anxious and depressed      Level of consciousness:  Alert and Oriented x4      Response to Learning: Able to verbalize current knowledge/experience      Endings: None Reported    Modes of Intervention: Education, Support, Socialization and Exploration      Discipline Responsible: /Counselor      Signature:  [address]
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
## 2020-07-07 NOTE — GROUP NOTE
Group Therapy Note    Date: 7/7/2020    Group Start Time:  8:30 AM  Group End Time:  9:45 AM  Group Topic: Psychotherapy    [facility]    Sally Og, [facility]        Group Therapy Note    Attendees: 5         Patient's Goal:  Pt's goal was to be engaged in group. Notes:  Pt was engaged in group. Pt met his goal.     Status After Intervention:  Unchanged    Participation Level:  Active Listener and Interactive    Participation Quality: Appropriate, Attentive and Sharing      Speech:  normal      Thought Process/Content: Logical  Linear      Affective Functioning: Congruent      Mood: anxious and depressed      Level of consciousness:  Alert, Oriented x4 and Attentive      Response to Learning: Able to verbalize current knowledge/experience and Progressing to goal      Endings: None Reported    Modes of Intervention: Education, Support, Socialization, Exploration, Clarifying, Problem-solving, Activity, Movement, Confrontation, Limit-setting and Reality-testing      Discipline Responsible: /Counselor      Signature:  Sally Og, [facility]

## 2020-07-07 NOTE — GROUP NOTE
Group Therapy Note    Date: 7/7/2020    Group Start Time: 11:15 AM  Group End Time: 12:15 PM  Group Topic: Psychoeducation    [facility]    Rosalia Ott, [facility]    Group Therapy Note    Attendees: 5    Patients learned about the differences between empathy and sympathy and learned about the skill of validation. Patients watched short YouTube video from Dr. Jason Sifuentes on this topic and discussed the video. Group discussed importance of practicing empathy and compassion with close relationships and with ourselves. Notes:  Pt was quiet though appeared to listen and take notes during group. Status After Intervention:  Improved    Participation Level:  Active Listener    Participation Quality: Appropriate      Speech:  hesitant      Thought Process/Content: Logical  Linear      Affective Functioning: Flat and Constricted/Restricted      Mood: anxious and depressed      Level of consciousness:  Alert and Oriented x4      Response to Learning: Able to verbalize current knowledge/experience, Able to verbalize/acknowledge new learning and Able to retain information      Endings: None Reported    Modes of Intervention: Education, Support, Socialization, Exploration, Clarifying, Problem-solving and Activity      Discipline Responsible: /Counselor      Signature:  RADHA Kenny-S, R-GUS

## 2020-07-07 NOTE — BH NOTE
Group Therapy Note    Date: 7/7/2020  Start Time: 1:15 pm  End Time:  2:30 pm  Number of Participants: 5    Type of Group: Psychoeducation    Patient's Goal:  Patients were invited to participate in an Art Therapy / Psycho-Education group on Radical Acceptance. Patient received a handout on Radical Acceptance and were invited to learn and discuss the concept. With therapist giving instructions, patients were asked to practice two radical acceptance techniques, Willing Hands and Half-smile. Lastly, patients were asked to explore how they can practice radical acceptance in their lives using an art material of their choice and were asked to share and process their work with the group. Notes: Esperanza Bermeo, who goes by Gwen Longo, participated in conversation on radical acceptance, engaged in art making on the topic, and shared \"Acceptance doesn't mean I'm giving up. I can accept what's going on with me, stop being in denial, and keep moving forward. \"    Status After Intervention:  Improved    Participation Level:  Active Listener and Interactive    Participation Quality: Appropriate, Attentive and Sharing      Speech:  normal      Thought Process/Content: Logical      Affective Functioning: Congruent      Mood: euthymic      Level of consciousness:  Alert, Oriented x4 and Attentive      Response to Learning: Able to verbalize current knowledge/experience, Able to verbalize/acknowledge new learning, Able to retain information, Capable of insight and Able to change behavior      Endings: None Reported    Modes of Intervention: Education, Support, Socialization, Exploration, Clarifying, Problem-solving and Activity      Discipline Responsible: Psychoeducational Specialist      Signature:  Alexandra Logan, [address] [facility]

## 2020-07-08 ENCOUNTER — HOSPITAL ENCOUNTER (OUTPATIENT)
Age: 40
Discharge: HOME | End: 2020-07-08
Payer: OTHER GOVERNMENT

## 2020-07-08 LAB
ANION GAP SERPL CALCULATED.3IONS-SCNC: 11 MMOL/L (ref 3–16)
BUN BLDV-MCNC: 11 MG/DL (ref 7–20)
CALCIUM SERPL-MCNC: 8.8 MG/DL (ref 8.3–10.6)
CHLORIDE BLD-SCNC: 101 MMOL/L (ref 99–110)
CO2: 29 MMOL/L (ref 21–32)
CREAT SERPL-MCNC: 0.8 MG/DL (ref 0.9–1.3)
GFR AFRICAN AMERICAN: >60
GFR NON-AFRICAN AMERICAN: >60
GLUCOSE BLD-MCNC: 98 MG/DL (ref 70–99)
LITHIUM DOSE AMOUNT: ABNORMAL
LITHIUM LEVEL: 0.5 MMOL/L (ref 0.6–1.2)
POTASSIUM SERPL-SCNC: 4.8 MMOL/L (ref 3.5–5.1)
SODIUM BLD-SCNC: 141 MMOL/L (ref 136–145)
TSH SERPL DL<=0.05 MIU/L-ACNC: 8.63 UIU/ML (ref 0.27–4.2)

## 2020-07-08 PROCEDURE — 36415 COLL VENOUS BLD VENIPUNCTURE: CPT

## 2020-07-08 PROCEDURE — 80178 ASSAY OF LITHIUM: CPT

## 2020-07-08 PROCEDURE — 80048 BASIC METABOLIC PNL TOTAL CA: CPT

## 2020-07-08 PROCEDURE — 84443 ASSAY THYROID STIM HORMONE: CPT

## 2020-07-08 NOTE — PRE-CERTIFICATION NOTE
Writer initiated precertification for this patient. Patient has [address] downloaded form from [employer], completed, and submitted request form [facility]. Will follow up.

## 2020-07-09 ENCOUNTER — HOSPITAL ENCOUNTER (OUTPATIENT)
Dept: PSYCHIATRY | Age: 40
Setting detail: THERAPIES SERIES
Discharge: HOME | End: 2020-07-09
Payer: OTHER GOVERNMENT

## 2020-07-09 VITALS — TEMPERATURE: 97.9 F

## 2020-07-09 PROCEDURE — 90853 GROUP PSYCHOTHERAPY: CPT

## 2020-07-09 PROCEDURE — 90853 GROUP PSYCHOTHERAPY: CPT | Performed by: COUNSELOR

## 2020-07-09 NOTE — GROUP NOTE
Group Therapy Note    Date: 7/9/2020    Group Start Time: 11:15 AM  Group End Time: 12:15 PM  Group Topic: Recovery    [facility]    Kennedi Valdovinos, [facility]        Group Therapy Note    Attendees: 7         Patient's Goal:  Patient will complete worksheet Don't Take Things Personally. Will explore ways to detach self from other's emotions by not making it about self. Notes:  Patient engaged well in group. Completed the worksheet and discussed. Talked about ways to detach from others negative emotions. Was able to reframe others thoughts, feelings, and behaviors as a reflection of themselves. He is also beginning to accept more responsibility for his own emotions. Status After Intervention:  Improved    Participation Level:  Active Listener and Interactive    Participation Quality: Appropriate, Attentive, Sharing and Supportive      Speech:  normal      Thought Process/Content: Logical      Affective Functioning: Congruent      Mood: anxious and depressed      Level of consciousness:  Alert and Oriented x4      Response to Learning: Able to verbalize current knowledge/experience      Endings: None Reported    Modes of Intervention: Education, Support, Socialization and Exploration      Discipline Responsible: /Counselor      Signature:  Kennedi Valdovinos, [facility]

## 2020-07-09 NOTE — GROUP NOTE
Group Therapy Note    Date: 7/9/2020    Group Start Time:  8:30 AM  Group End Time:  9:45 AM  Group Topic: Psychotherapy    [facility]    [address]    Group Therapy Note    Attendees: 7    Patient shared and set a goal at the beginning of group to practice a new way of being in group today. Notes:  Pt set goal to work on [employer] to develop relationships and build trust\". Pt shared he struggles to trust anyone in his life and reported he has not had good friends. Pt shared more in group when peer encouraged him and appeared to listen to peers during group discussion. Status After Intervention:  Improved    Participation Level:  Active Listener    Participation Quality: Appropriate      Speech:  normal      Thought Process/Content: Linear      Affective Functioning: Constricted/Restricted      Mood: anxious and depressed      Level of consciousness:  Alert and Oriented x4      Response to Learning: Able to verbalize current knowledge/experience, Able to verbalize/acknowledge new learning and Able to retain information      Endings: None Reported    Modes of Intervention: Education, Support, Socialization, Exploration, Clarifying and Problem-solving      Discipline Responsible: /Counselor      Signature:  Juani Preciado, RADHA-S, BENIGNO

## 2020-07-09 NOTE — GROUP NOTE
Group Therapy Note    Date: 7/9/2020    Group Start Time: 10:00 AM  Group End Time: 11:00 AM  Group Topic: Cognitive Skills    [facility]    Sally Og [facility]        Group Therapy Note    Attendees: 7       Patient's Goal:  Pt's goal was to learn about healthy sleep hygiene and things Pt can do to improve sleep. Notes:  Pt was engaged in group. Pt met goal.     Status After Intervention:  Unchanged    Participation Level:  Active Listener and Interactive    Participation Quality: Appropriate, Attentive and Sharing      Speech:  normal      Thought Process/Content: Logical  Linear      Affective Functioning: Congruent      Mood: depressed      Level of consciousness:  Alert, Oriented x4 and Attentive      Response to Learning: Able to verbalize current knowledge/experience and Progressing to goal      Endings: None Reported    Modes of Intervention: Education, Support, Socialization, Exploration, Clarifying, Problem-solving, Activity, Movement, Confrontation, Limit-setting and Reality-testing      Discipline Responsible: /Counselor      Signature:  Sally Og, [facility]

## 2020-07-09 NOTE — BH NOTE
Patient requested a refill on his Seroquel. Dr. Mandy Farah approved refill and is also prescribing an increase to patient's lithium to 600mg d/t having a lithium level of 0.5. Called in prescriptions for Seroquel XR 200mg Nightly and Lithium 600mg 2x/day to [facility], patient's preferred pharmacy.

## 2020-07-14 ENCOUNTER — HOSPITAL ENCOUNTER (OUTPATIENT)
Dept: PSYCHIATRY | Age: 40
Setting detail: THERAPIES SERIES
Discharge: HOME | End: 2020-07-14
Payer: OTHER GOVERNMENT

## 2020-07-14 VITALS — RESPIRATION RATE: 14 BRPM | HEART RATE: 56 BPM | DIASTOLIC BLOOD PRESSURE: 76 MMHG | SYSTOLIC BLOOD PRESSURE: 122 MMHG

## 2020-07-14 VITALS — TEMPERATURE: 98.3 F

## 2020-07-14 PROCEDURE — 90853 GROUP PSYCHOTHERAPY: CPT

## 2020-07-14 PROCEDURE — 90853 GROUP PSYCHOTHERAPY: CPT | Performed by: COUNSELOR

## 2020-07-14 PROCEDURE — 99215 OFFICE O/P EST HI 40 MIN: CPT | Performed by: PSYCHIATRY & NEUROLOGY

## 2020-07-14 NOTE — GROUP NOTE
Group Therapy Note    Date: 7/14/2020    Group Start Time:  8:30 AM  Group End Time:  9:45 AM  Group Topic: Psychotherapy    [facility] CAROL Donahue, [facility]    Group Therapy Note    Attendees: 6    Patient shared and set a goal at the beginning of group to practice a new way of being in group today. Notes:  Pt was engaged in group and shared to learn and engage with the group. Pt appeared to meet goal as he seemed to listen and relate to peers throughout the group. Status After Intervention:  Improved    Participation Level:  Active Listener and Interactive    Participation Quality: Appropriate and Attentive      Speech:  normal      Thought Process/Content: Logical  Linear      Affective Functioning: Flat and Constricted/Restricted      Mood: anxious and depressed      Level of consciousness:  Alert and Oriented x4      Response to Learning: Able to verbalize current knowledge/experience, Able to verbalize/acknowledge new learning and Able to retain information      Endings: None Reported    Modes of Intervention: Education, Support, Socialization, Exploration, Clarifying and Problem-solving      Discipline Responsible: /Counselor      Signature:  LAZ Mora, BENIGNO

## 2020-07-14 NOTE — BH NOTE
Group Therapy Note    Date: 7/14/2020  Start Time: 1:15 pm  End Time:  2:15 pm  Number of Participants: 5    Type of Group: Psychoeducation    Patient's Goal:  Patients were invited to participate in an Art Therapy / Psycho-Education group on grounding and processing. Patients began the activity with a guided grounding exercise. Next, patients were invited to think of an issue or strong emotion that they were experiencing currently and to create an art piece reflecting and exploring their thoughts and feelings regarding the issue or emotion. Once their art piece was complete, patients then received a writing, poetry prompt to explore the meaning of their art piece further. Patients were asked to share and process their work at the end of session. Notes:  TJ appeared to engage in the grounding exercise, participated in art making and in writing, and shared his work with the group, which was focused on the \"plight of the environment\" and his hope for \"people to come together. \"    Status After Intervention:  Improved    Participation Level:  Active Listener and Interactive    Participation Quality: Appropriate, Attentive and Sharing      Speech:  normal      Thought Process/Content: Logical      Affective Functioning: Congruent      Mood: anxious      Level of consciousness:  Alert, Oriented x4 and Attentive      Response to Learning: Able to verbalize current knowledge/experience, Able to verbalize/acknowledge new learning, Able to retain information, Capable of insight       Endings: None Reported    Modes of Intervention: Education, Support, Socialization and Exploration      Discipline Responsible: Psychoeducational Specialist      Signature:  Lalo Love, [facility]

## 2020-07-14 NOTE — PRE-CERTIFICATION NOTE
Writer is working to complete precertification for these services. Apparently member insurance is not [employer], it is [employer], and requires a different process to preaut. Patient is engaged in [facility], receiving treatment with a psychiatrist and therapy team in group therapy two days a week for six hours a day, totaling 12 hours weekly, with medicine management and individual sessions as needed. [place]PT codes [place] 57[address] and     Patient began program 7/7/2020 and is anticipated to discharge 8/12/2020, after 12 visits. If more visits are needed UR will do concurrent review to ask for those days and prove clinical necessity. UR ContactCathy Era  [phone] fax [phone]    Plan:  Stabilize mood, promote sleep hygiene and ensure sleep is within normal limits, improve concentration, decrease substance use, encourage socialization, reinforce healthy boundaries and relationships, and improve thinking, with psychotherapy groups, cognitive skill groups, relapse prevention groups, psychoeducation groups, psychiatric medicine management with a psychiatrist medical doctor, and individual sessions with the therapy team as needed for achieving these goals.

## 2020-07-15 ENCOUNTER — HOSPITAL ENCOUNTER (OUTPATIENT)
Age: 40
Discharge: HOME | End: 2020-07-15
Payer: OTHER GOVERNMENT

## 2020-07-15 LAB
LITHIUM DOSE AMOUNT: NORMAL
LITHIUM LEVEL: 0.8 MMOL/L (ref 0.6–1.2)
T4 FREE: 1.2 NG/DL (ref 0.9–1.8)
TSH SERPL DL<=0.05 MIU/L-ACNC: 15.36 UIU/ML (ref 0.27–4.2)

## 2020-07-15 PROCEDURE — 36415 COLL VENOUS BLD VENIPUNCTURE: CPT

## 2020-07-15 PROCEDURE — 84439 ASSAY OF FREE THYROXINE: CPT

## 2020-07-15 PROCEDURE — 80178 ASSAY OF LITHIUM: CPT

## 2020-07-15 PROCEDURE — 84443 ASSAY THYROID STIM HORMONE: CPT

## 2020-07-15 NOTE — PROGRESS NOTES
without side  effects. He requires further stabilization in our intensive outpatient  program.    DIAGNOSES:  1. Bipolar 1 disorder, most recently manic with psychotic features. 2.  Cannabis use disorder, severe. PLAN:  1. intensive outpatient program for stabilization and treatment. 2.  On admission, continued lithium 450 mg twice daily and Seroquel 200 mg p.o. q.h.s.. Goal is monotherapy with lithium once therapeutic. Discussed this at length with the patient. He is in agreement. 7/9/2020 - Lithium level 0.5 - increase Lithium to 600mg BID. TSH more elevated. 7/14/2020 - recheck lithium and TSH tomorrow. 3.  Safety plan discussed at length including presenting to the nearest  emergency department or calling 911 if he should develop thoughts of  self-harm. 4.  Psychoeducation provided regarding bipolar disorder and the various  things he can do to help recognize mood episodes and prevent them in the  future. Also discussed the importance of sobriety and that daily  cannabis use will be a confounding factor in his diagnosis and mental  State. 5.  Followup with me in 1 week for further treatment.  sooner if  needed.     Total time with patient was 35 minutes and more than 50 % of that time was spent counseling the patient on their symptoms, treatment, and expected goals.      Narinder Fischer MD, [facility] Main [place],Third Floor, [place]

## 2020-07-15 NOTE — BH NOTE
Treatment team met on 7/15/20 to discuss patient's treatment goals. Patient is making progress and sharing in groups. He is a newly diagnosed with bipolar. He feels this diagnosis has shed some light on his life and how he has been. 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 He and Dr. Tana Gilbert spoke about it, Dr. Tana Gilbert encouraged him to continue to do what he enjoys doing. Patient has shared in groups that he has trust issues that he is working on.

## 2020-07-16 ENCOUNTER — HOSPITAL ENCOUNTER (OUTPATIENT)
Dept: PSYCHIATRY | Age: 40
Setting detail: THERAPIES SERIES
Discharge: HOME | End: 2020-07-16
Payer: OTHER GOVERNMENT

## 2020-07-16 VITALS
TEMPERATURE: 98 F | DIASTOLIC BLOOD PRESSURE: 64 MMHG | HEART RATE: 73 BPM | SYSTOLIC BLOOD PRESSURE: 115 MMHG | RESPIRATION RATE: 16 BRPM

## 2020-07-16 PROCEDURE — 90853 GROUP PSYCHOTHERAPY: CPT

## 2020-07-16 PROCEDURE — 90853 GROUP PSYCHOTHERAPY: CPT | Performed by: COUNSELOR

## 2020-07-16 PROCEDURE — 99215 OFFICE O/P EST HI 40 MIN: CPT | Performed by: PSYCHIATRY & NEUROLOGY

## 2020-07-16 RX ORDER — LAMOTRIGINE 25 MG/1
25 TABLET ORAL DAILY
Qty: 30 TABLET | Refills: 0 | Status: SHIPPED | OUTPATIENT
Start: 2020-07-16 | End: 2020-07-30 | Stop reason: SDUPTHER

## 2020-07-16 RX ORDER — QUETIAPINE FUMARATE 300 MG/1
300 TABLET, FILM COATED ORAL NIGHTLY
Qty: 30 TABLET | Refills: 0 | Status: SHIPPED | OUTPATIENT
Start: 2020-07-16 | End: 2020-08-13 | Stop reason: SDUPTHER

## 2020-07-16 NOTE — BH NOTE
Group Therapy Note    Date: 7/16/2020  Start Time: 1:15 pm  End Time:  2:15 pm  Number of Participants: 4    Type of Group: Psychoeducation    Patient's Goal:  Patients were invited to participate in an Art Therapy / Psycho-Education group diagraming patients interpersonal relationships. Patients were asked to depict through art making what their boundaries looked like and if their relationships needed to be strengthened, weakened, or removed to help patients practice healthy boundaries. At the end of group, patients were encouraged to share and process their work and to reflect on their relationships and personal boundaries. Notes:  TJ engaged in group discussion on personal boundaries, engaged in art making on diagramming interpersonal relationships, and shared artwork with the group, processing that his goal for the weekend was to [employer] with my sons. It's been a long time. \"    Status After Intervention:  Improved    Participation Level:  Active Listener and Interactive    Participation Quality: Appropriate, Attentive, Sharing and Supportive      Speech:  normal      Thought Process/Content: Logical      Affective Functioning: Blunted      Mood: anxious      Level of consciousness:  Alert, Oriented x4 and Attentive      Response to Learning: Able to verbalize current knowledge/experience, Able to verbalize/acknowledge new learning, Able to retain information, Capable of insight and Able to change behavior      Endings: None Reported    Modes of Intervention: Education, Support, Socialization, Exploration, Problem-solving, Activity and Media      Discipline Responsible: Psychoeducational Specialist      Signature:  STEVE Gonzalez

## 2020-07-16 NOTE — GROUP NOTE
Group Therapy Note    Date: 7/16/2020    Group Start Time: 11:15 AM  Group End Time: 12:15 PM  Group Topic: Recovery    [facility]    Herbert Camp, [facility]        Group Therapy Note    Attendees: 5         Patient's Goal:  Patient will complete worksheet on acceptance. Will discuss how acceptance in life contributes to positive mental health. Notes:  Patient engaged well in group. Completed the worksheet and discussed. He talked about the need to accept that he needs help with his mental health. He talked about his struggles in trying to get better alone. Status After Intervention:  Improved    Participation Level:  Active Listener and Interactive    Participation Quality: Appropriate, Attentive, Sharing and Supportive    Speech:  normal    Thought Process/Content: Logical Linear    Affective Functioning: Congruent    Mood: anxious and depressed    Level of consciousness:  Oriented x4    Response to Learning: Able to verbalize current knowledge/experience    Endings: None Reported    Modes of Intervention: Education, Support, Socialization and Exploration    Discipline Responsible: /Counselor    Signature:  Herbert Camp, [facility]

## 2020-07-16 NOTE — PROGRESS NOTES
after an inpatient admission for bipolar roman. The patient  meets criteria for bipolar 1 disorder, most recently manic with  psychotic features. He presents having developed more depressive  symptoms. He is tolerating lithium and Seroquel well and without side  effects. He requires further stabilization in our intensive outpatient  program.    DIAGNOSES:  1. Bipolar 1 disorder, most recently manic with psychotic features. 2.  Cannabis use disorder, severe. PLAN:  1. intensive outpatient program for stabilization and treatment. 2.  On admission, continued lithium 450 mg twice daily and Seroquel 200 mg p.o. q.h.s.. Goal is monotherapy with lithium once therapeutic. Discussed this at length with the patient. He is in agreement. 7/9/2020 - Lithium level 0.5 - increase Lithium to 600mg BID. TSH more elevated. 7/14/2020 - recheck lithium and TSH. Lithium level on 7/15 was 0.8 However, his TSH continues to increase and was 15.36 on 7/15, up from 8.63 on 7/8. His free T4 on 7/15 was normal at 1.2    7/16/2020 - agreed to DC lithium, increase quetiapine in the short-term, and start Lamictal 25mg daily. Goal is monotherapy with Lamictal.     3.  Safety plan call 911 if he should develop thoughts of self-harm. 4,  Follow up with me in 1 week for further treatment. sooner if needed.     Total time with patient was 40 minutes and more than 50 % of that time was spent counseling the patient on their symptoms, treatment, and expected goals.      Lorena Vigil MD, [facility] Main [place],Third Floor, [place]

## 2020-07-16 NOTE — GROUP NOTE
Group Therapy Note    Date: 7/16/2020    Group Start Time: 10:00 AM  Group End Time: 11:00 AM  Group Topic: Psychoeducation    [facility] CAROL Womack, [facility]    Group Therapy Note    Attendees: 5    Patients learned about setting healthy boundaries and were provided psycho education handouts during group. Patients reviewed handouts together and discussed ways they can set boundaries despite feeling there are barriers to having them. Notes:  Pt was engaged in group discussion with peers and appeared to listen to peers. Status After Intervention:  Improved    Participation Level:  Active Listener and Interactive    Participation Quality: Appropriate and Attentive      Speech:  normal      Thought Process/Content: Logical  Linear      Affective Functioning: Flat and Constricted/Restricted      Mood: anxious and depressed      Level of consciousness:  Alert and Oriented x4      Response to Learning: Able to verbalize current knowledge/experience, Able to verbalize/acknowledge new learning and Able to retain information      Endings: None Reported    Modes of Intervention: Education, Support, Socialization, Exploration, Clarifying, Problem-solving and Activity      Discipline Responsible: /Counselor      Signature:  LAZ Beasley, RKATHIE

## 2020-07-16 NOTE — GROUP NOTE
Group Therapy Note    Date: 7/16/2020    Group Start Time:  8:30 AM  Group End Time:  9:45 AM  Group Topic: Psychotherapy    [facility]    Sally Og, [facility]        Group Therapy Note    Attendees: 5         Patient's Goal:  Pt's goal was to be open, communicate and be engaged in group. Notes:  Pt engaged in group at times and when called on would share and give feedback. Pt shared about his relationship with his sons and the struggles he has. Pt met his goal.      Status After Intervention:  Improved    Participation Level:  Active Listener and Interactive    Participation Quality: Appropriate, Attentive and Sharing      Speech:  normal      Thought Process/Content: Logical  Linear      Affective Functioning: Congruent      Mood: depressed      Level of consciousness:  Alert, Oriented x4 and Attentive      Response to Learning: Able to verbalize current knowledge/experience, Able to verbalize/acknowledge new learning, Able to retain information, Capable of insight, Able to change behavior and Progressing to goal      Endings: None Reported    Modes of Intervention: Education, Support, Socialization, Exploration, Clarifying, Problem-solving, Activity, Movement, Confrontation, Limit-setting and Reality-testing      Discipline Responsible: /Counselor      Signature:  Sally Og, [facility]

## 2020-07-21 ENCOUNTER — HOSPITAL ENCOUNTER (OUTPATIENT)
Dept: PSYCHIATRY | Age: 40
Setting detail: THERAPIES SERIES
Discharge: HOME | End: 2020-07-21
Payer: OTHER GOVERNMENT

## 2020-07-21 VITALS
DIASTOLIC BLOOD PRESSURE: 72 MMHG | RESPIRATION RATE: 14 BRPM | HEART RATE: 59 BPM | SYSTOLIC BLOOD PRESSURE: 121 MMHG | TEMPERATURE: 98.1 F

## 2020-07-21 PROCEDURE — 99215 OFFICE O/P EST HI 40 MIN: CPT | Performed by: PSYCHIATRY & NEUROLOGY

## 2020-07-21 PROCEDURE — 90853 GROUP PSYCHOTHERAPY: CPT | Performed by: COUNSELOR

## 2020-07-21 PROCEDURE — 90853 GROUP PSYCHOTHERAPY: CPT

## 2020-07-21 NOTE — GROUP NOTE
Group Therapy Note    Date: 7/21/2020    Group Start Time: 10:00 AM  Group End Time: 11:00 AM  Group Topic: Recovery    [facility]    Loc [facility] [employer]        Group Therapy Note    Attendees: 6           Patient's Goal:  Patient will complete worksheet Change Your Perspective/Change Your LIfe. Will discuss how perspective change influences improved mood and outlook and identify how to apply to life circumstances. Notes:  Patient attended group. Completed the worksheet and discussed. Patient verbalized an understanding of the topic and provided examples of how he could look at life differently. Status After Intervention:  Improved    Participation Level:  Active Listener and Interactive    Participation Quality: Appropriate, Attentive, Sharing and Supportive    Speech:  normal    Thought Process/Content: Logical  Linear    Affective Functioning: Congruent    Mood: anxious and depressed    Level of consciousness:  Oriented x4    Response to Learning: Able to verbalize current knowledge/experience and Capable of insight    Endings: None Reported    Modes of Intervention: Education, Support, Socialization and Exploration    Discipline Responsible: /Counselor     Signature:  Loc Baugh [employer]

## 2020-07-21 NOTE — GROUP NOTE
Group Therapy Note    Date: 7/21/2020    Group Start Time:  8:30 AM  Group End Time:  9:45 AM  Group Topic: Psychotherapy    [facility]    Sally Og, [facility]        Group Therapy Note    Attendees: 6         Patient's Goal:  Pt's goal was to engaged in group. Notes:  Pt was engaged in group. Pt shared that he sent his oldest son a text about how mental health treatment has been helpful for him  Pt reported that he talked to his youngest son. Pt shared that he is struggling with his mom and thinks he needs to set boundaries with her. Pt met his goal.     Status After Intervention:  Improved    Participation Level:  Active Listener and Interactive    Participation Quality: Appropriate, Attentive and Sharing      Speech:  normal      Thought Process/Content: Logical  Linear      Affective Functioning: Congruent      Mood: euthymic      Level of consciousness:  Alert, Oriented x4 and Attentive      Response to Learning: Able to verbalize current knowledge/experience, Able to verbalize/acknowledge new learning, Able to retain information, Capable of insight, Able to change behavior and Progressing to goal      Endings: None Reported    Modes of Intervention: Education, Support, Socialization, Exploration, Clarifying, Problem-solving, Activity, Movement, Confrontation, Limit-setting and Reality-testing      Discipline Responsible: /Counselor      Signature:  Sally Og, [facility]

## 2020-07-21 NOTE — PROGRESS NOTES
Department of Psychiatry  Progress Note    Patient's chart was reviewed. Discussed with treatment team. Met with patient. SUBJECTIVE:    Some decreased mood since stopping lithium - lower motivation and increase anhedonia. Otherwise making some progress - has reached out to his sons. Two have returned his messages. Tolerating Lamictal and increased dose of Quetiapine well and without side effects. No thoughts of self-harm or suicide    ROS:   Patient has new complaints: no  Sleeping adequately:  Yes   Appetite adequate: Yes  Engaged in programming: Yes    Did not endorse or describe head aches, SOB, cough, sore throat, chills, chest pain, abdominal pain, neuro problems, bleeding problems, or skin problems. Was moving all four extremities and speaking without difficulty. OBJECTIVE:  VITALS:  Stable  Gait: normal    Mental Status Examination:    Appearance: good grooming and hygiene  Behavior/Attitude toward examiner:  pleasant; good eye contact  Speech: Normal rate, volume, amount  Mood:  \"ok\"  Affect:  blunted     Thought processes:  Goal directed, linear, no YOON or gross disorganization  Thought Content: no SI, no HI, no delusions voiced, no obsessions  Perceptions: no AVH  Attention: attention span and concentration were intact to interview   Abstraction: intact  Cognition:  Alert and oriented to person, place, time, and situation, recall intact  Insight: intact  Judgment: intact     Medication:  Lamictal 25mg daily  Quetiapine 300mg QHS    FORMULATIONS:  This is a domiciled, , unemployed, 24-year-old  with bipolar 1 disorder and cannabis use disorder, who was referred to  our program after an inpatient admission for bipolar roman. The patient  meets criteria for bipolar 1 disorder, most recently manic with  psychotic features. He presents having developed more depressive  symptoms. He is tolerating lithium and Seroquel well and without side  effects.   He requires further stabilization in our intensive outpatient  program.    DIAGNOSES:  1. Bipolar 1 disorder, most recently manic with psychotic features. 2.  Cannabis use disorder, severe. PLAN:  1. intensive outpatient program for stabilization and treatment. 2.  On admission, continued lithium 450 mg twice daily and Seroquel 200 mg p.o. q.h.s.. Goal is monotherapy with lithium once therapeutic. Discussed this at length with the patient. He is in agreement. 7/9/2020 - Lithium level 0.5 - increase Lithium to 600mg BID. TSH more elevated. 7/14/2020 - recheck lithium and TSH. Lithium level on 7/15 was 0.8 However, his TSH continues to increase and was 15.36 on 7/15, up from 8.63 on 7/8. His free T4 on 7/15 was normal at 1.2    7/16/2020 - agreed to DC lithium, increase quetiapine in the short-term, and start Lamictal 25mg daily. Goal is monotherapy with Lamictal.     7/21/2020 - increase Lamictal to 25mg BID. Continue quetiapine as is. 3.  Safety plan call 911 if he should develop thoughts of self-harm. 4.  Follow up with me in 1 week for further treatment. sooner if needed.     Total time with patient was 40 minutes and more than 50 % of that time was spent counseling the patient on their symptoms, treatment, and expected goals.      Agata Ramirez MD, [address] [place] [address],Third Floor, [place]

## 2020-07-21 NOTE — BH NOTE
Group Therapy Note    Date: 7/21/2020  Start Time: 1:15 pm  End Time:  2:40 pm  Number of Participants: 6    Type of Group: Psychoeducation    Patient's Goal:  Patients were invited to participate in an Art Therapy / Psycho-Education group on identifying and recognizing personal strengths. Patients received a handout on developing strengths through self-exploration, which they discussed as a group. Next, patients were encouraged to create a magnetic placard art work for [employer], which was a representation of themselves and their strengths. Lastly, patients were encouraged to share and process their art placards and to identify strengths in one another. Notes:  TJ engaged in group discussion, participated in creating a nametag placard of strengths, and shared his artwork with group, identifying himself as \"considerate, authentic, empathetic, and courteous. \"    Status After Intervention:  Improved    Participation Level:  Active Listener and Interactive    Participation Quality: Appropriate, Attentive, Sharing and Supportive      Speech:  normal      Thought Process/Content: Logical      Affective Functioning: Congruent      Mood: anxious      Level of consciousness:  Alert, Oriented x4 and Attentive      Response to Learning: Able to verbalize current knowledge/experience, Able to verbalize/acknowledge new learning, Able to retain information, Capable of insight and Able to change behavior      Endings: None Reported    Modes of Intervention: Education, Support, Socialization, Exploration, Clarifying, Activity and Media      Discipline Responsible: Psychoeducational Specialist      Signature:  STEVE Gonzalez

## 2020-07-23 ENCOUNTER — HOSPITAL ENCOUNTER (OUTPATIENT)
Dept: PSYCHIATRY | Age: 40
Setting detail: THERAPIES SERIES
Discharge: HOME | End: 2020-07-23
Payer: OTHER GOVERNMENT

## 2020-07-23 VITALS
SYSTOLIC BLOOD PRESSURE: 108 MMHG | RESPIRATION RATE: 16 BRPM | HEART RATE: 63 BPM | DIASTOLIC BLOOD PRESSURE: 68 MMHG | TEMPERATURE: 98.1 F

## 2020-07-23 PROCEDURE — 90853 GROUP PSYCHOTHERAPY: CPT | Performed by: COUNSELOR

## 2020-07-23 PROCEDURE — 99215 OFFICE O/P EST HI 40 MIN: CPT | Performed by: PSYCHIATRY & NEUROLOGY

## 2020-07-23 PROCEDURE — 90853 GROUP PSYCHOTHERAPY: CPT

## 2020-07-23 NOTE — PROGRESS NOTES
Department of Psychiatry  Progress Note    Patient's chart was reviewed. Discussed with treatment team. Met with patient. SUBJECTIVE:    Reports lower mood. When asked to describe what he means, says he feels less motivated to do things. It may be related to the increased dose of Quetiapine. PHQ9 today - 13 - scored 0 on item 9. Did not increase Lamictal to 25mg BID as discussed at last visit. . There was a misunderstanding. He has been taking 25mg QHS only. Agrees to increase to 25mg BID starting today. ROS:   Patient has new complaints: no  Sleeping adequately:  Yes   Appetite adequate: Yes  Engaged in programming: Yes    Did not endorse or describe head aches, SOB, cough, sore throat, chills, chest pain, abdominal pain, neuro problems, bleeding problems, or skin problems. Was moving all four extremities and speaking without difficulty. OBJECTIVE:  VITALS:  Stable  Gait: normal    Mental Status Examination:    Appearance: good grooming and hygiene  Behavior/Attitude toward examiner:  pleasant; good eye contact  Speech: Normal rate, volume, amount  Mood:  \"fine\"  Affect:  blunted     Thought processes:  Goal directed, linear, no YOON or gross disorganization  Thought Content: no SI, no HI, no delusions voiced, no obsessions  Perceptions: no AVH  Attention: attention span and concentration were intact to interview   Abstraction: intact  Cognition:  Alert and oriented to person, place, time, and situation, recall intact  Insight: intact  Judgment: intact     Medication:  Lamictal 25mg daily  Quetiapine 300mg QHS    FORMULATIONS:  This is a domiciled, , unemployed, 53-year-old  with bipolar 1 disorder and cannabis use disorder, who was referred to  our program after an inpatient admission for bipolar roman. The patient  meets criteria for bipolar 1 disorder, most recently manic with  psychotic features. He presents having developed more depressive  symptoms.   He is tolerating lithium and Seroquel well and without side  effects. He requires further stabilization in our intensive outpatient  program.    DIAGNOSES:  1. Bipolar 1 disorder, most recently manic with psychotic features. 2.  Cannabis use disorder, severe. PLAN:  1. intensive outpatient program for stabilization and treatment. 2.  On admission, continued lithium 450 mg twice daily and Seroquel 200 mg p.o. q.h.s.. Goal is monotherapy with lithium once therapeutic. Discussed this at length with the patient. 7/9/2020 - Lithium level 0.5 - increased Lithium to 600mg BID. TSH elevated. 7/15/2020 -. Lithium level 0.8. However, his TSH continued to increase and was 15.36, up from 8.63 on 7/8. His free T4 on 7/15 was normal at 1.2    7/16/2020 - discontinued lithium, increased quetiapine in the short-term, and started Lamictal 25mg daily. Goal is monotherapy with Lamictal.     7/23/2020 - increase Lamictal to 25mg BID. Continue quetiapine as is. 3.  Safety plan - call 911 if he should develop thoughts of self-harm. 4.  Follow up with me on Tuesday.     Total time with patient was 40 minutes and more than 50 % of that time was spent counseling the patient on their symptoms, treatment, and expected goals.      Miranda Pratt MD, [facility] Main [place],Third Floor, [place]

## 2020-07-23 NOTE — GROUP NOTE
Group Therapy Note    Date: 7/23/2020    Group Start Time: 11:15 AM  Group End Time: 12:15 PM  Group Topic: Recovery    [facility]    Gillian Paige [employer]        Group Therapy Note    Attendees: 5         Patient's Goal:  Patient will attend group and complete worksheet on Fear of Change. Will identify ways that this fear is effecting mood and goals. Notes:  Patient attended group. Completed the worksheet and discussed how it applied to her. He talked about fears of intimacy and how change was something that brought him much fear. Status After Intervention:  Improved    Participation Level:  Active Listener    Participation Quality: Appropriate, Attentive and Sharing      Speech:  normal      Thought Process/Content: Logical  Linear      Affective Functioning: Congruent      Mood: anxious and depressed      Level of consciousness:  Oriented x4      Response to Learning: Able to verbalize current knowledge/experience      Endings: Suicidality    Modes of Intervention: Education, Support and Socialization      Discipline Responsible: /Counselor      Signature:  Gillian Paige [employer]

## 2020-07-23 NOTE — BH NOTE
Group Therapy Note    Date: 7/23/2020  Start Time: 1:15 pm  End Time:  2:30 pm  Number of Participants: 5    Type of Group: Psychoeducation    Patient's Goal:  Patients were invited to participate in an Art Therapy / Psycho-Education group. Patients were asked to reflect on the stories they tell themselves and to asked to engage in art making to rewrite their stories, changing or challenging personal narrative that was self-limiting or harmful. At the end of group, patients were asked to share and process their work. Notes:  TJ engaged in art making, group conversation, and shared artwork with the group, processing how to change personal narrative. Status After Intervention:  Improved    Participation Level:  Active Listener and Interactive    Participation Quality: Appropriate, Attentive, Sharing and Supportive      Speech:  normal      Thought Process/Content: Logical      Affective Functioning: Congruent      Mood: anxious      Level of consciousness:  Alert, Oriented x4 and Attentive      Response to Learning: Able to verbalize current knowledge/experience, Able to verbalize/acknowledge new learning, Able to retain information, Capable of insight and Able to change behavior      Endings: None Reported    Modes of Intervention: Education, Support, Socialization, Exploration, Activity and Media      Discipline Responsible: Psychoeducational Specialist      Signature:  STEVE Luciano

## 2020-07-28 ENCOUNTER — HOSPITAL ENCOUNTER (OUTPATIENT)
Dept: PSYCHIATRY | Age: 40
Setting detail: THERAPIES SERIES
Discharge: HOME | End: 2020-07-28
Payer: OTHER GOVERNMENT

## 2020-07-28 VITALS — RESPIRATION RATE: 14 BRPM | SYSTOLIC BLOOD PRESSURE: 111 MMHG | DIASTOLIC BLOOD PRESSURE: 69 MMHG | HEART RATE: 61 BPM

## 2020-07-28 VITALS — TEMPERATURE: 98 F

## 2020-07-28 PROCEDURE — 90853 GROUP PSYCHOTHERAPY: CPT | Performed by: COUNSELOR

## 2020-07-28 PROCEDURE — 90853 GROUP PSYCHOTHERAPY: CPT

## 2020-07-28 NOTE — BH NOTE
Group Therapy Note    Date: 7/28/2020  Start Time: 1:15 pm  End Time:  2:25 pm  Number of Participants: 5    Type of Group: Psychoeducation    Patient's Goal:  Patients were invited to participate in a Psycho-Education / Art Therapy group on stress reduction and self-acceptance through art making. Patients were invited to use yeison to create a three-dimensional sculpture of their strengths and personal attributes. Patients were encouraged to focus on the present while engaging in three-dimensional art making and at the end of session, were asked to share and process their work. Notes:  TJ engaged in group discussion on self-acceptance, engaged in art making, and shared and processed artwork with the group. Status After Intervention:  Unchanged    Participation Level:  Active Listener and Interactive    Participation Quality: Appropriate, Attentive, Sharing and Supportive      Speech:  normal      Thought Process/Content: Logical      Affective Functioning: Blunted      Mood: depressed      Level of consciousness:  Alert, Oriented x4 and Attentive      Response to Learning: Able to verbalize current knowledge/experience, Able to verbalize/acknowledge new learning, Able to retain information, Capable of insight and Able to change behavior      Endings: None Reported    Modes of Intervention: Education, Support, Socialization, Exploration, Clarifying, Activity and Media      Discipline Responsible: Psychoeducational Specialist      Signature:  STEVE Priest

## 2020-07-28 NOTE — GROUP NOTE
Group Therapy Note    Date: 7/28/2020    Group Start Time: 11:15 AM  Group End Time: 12:15 PM  Group Topic: Cognitive Skills    WASHINGTON MENDIOLA I    [address] [place] [address]        Group Therapy Note    Attendees: 5         Patient's Goal:  Handed out and discussed worksheets on guilt vs shame and pt's shared his own feelings in group. Notes:  Pt participated in group discussion and gave feedback to other group members. Status After Intervention:  Improved    Participation Level:  Active Listener and Interactive    Participation Quality: Appropriate, Attentive, Sharing and Supportive      Speech:  hesitant      Thought Process/Content: Logical      Affective Functioning: Flat      Mood: depressed      Level of consciousness:  Alert, Oriented x4 and Attentive      Response to Learning: Able to verbalize current knowledge/experience, Able to verbalize/acknowledge new learning and Progressing to goal      Endings: None Reported    Modes of Intervention: Education, Support, Socialization, Exploration and Clarifying      Discipline Responsible: /Counselor      Signature:  [address] [place] [address]

## 2020-07-28 NOTE — GROUP NOTE
Group Therapy Note    Date: 7/28/2020    Group Start Time: 10:00 AM  Group End Time: 11:00 AM  Group Topic: Recovery    [facility]    Khurram Musa, [facility]        Group Therapy Note    Attendees: 5         Patient's Goal:  Patient will complete worksheet on Dependency and will discuss how over-reliance on others' approval effects mental health. Notes:  Patient attended group. Completed the worksheet and discussed in group. Patient was able to identify how his fear of what others thought controlled his life. Received feedback from peers. Status After Intervention:  Improved    Participation Level:  Active Listener and Interactive    Participation Quality: Appropriate and Attentive    Speech:  normal    Thought Process/Content: Logical    Affective Functioning: Congruent    Mood: anxious, depressed     Level of consciousness:  Oriented x4    Response to Learning: Able to verbalize current knowledge/experience and Able to verbalize/acknowledge new learning    Endings: None Reported    Modes of Intervention: Education, Support, Socialization and Exploration    Discipline Responsible: /Counselor    Signature:  Khurram Musa, [facility]

## 2020-07-28 NOTE — GROUP NOTE
Group Therapy Note    Date: 7/28/2020    Group Start Time:  8:30 AM  Group End Time:  9:45 AM  Group Topic: Psychotherapy    ANDREWZ MARLENA Morejon, [facility]    Group Therapy Note    Attendees: 5    Patient shared and set a goal at the beginning of group to practice a new way of being in group today. Notes:  Pt set goal to engage more with peers in group though pt seemed to quietly listen during group today. Status After Intervention:  Improved    Participation Level:  Active Listener    Participation Quality: Appropriate      Speech:  hesitant      Thought Process/Content: Linear      Affective Functioning: Flat and Constricted/Restricted      Mood: anxious      Level of consciousness:  Alert and Oriented x4      Response to Learning: Able to verbalize current knowledge/experience, Able to verbalize/acknowledge new learning and Able to retain information      Endings: None Reported    Modes of Intervention: Education, Support, Socialization, Exploration, Clarifying and Problem-solving      Discipline Responsible: /Counselor      Signature:  RADHA Saldana-S, R-GUS

## 2020-07-29 NOTE — BH NOTE
Outpatient Treatment Team Progress Note  Date of Review: 7/29/2020        Multidisciplinary Outpatient Treatment Team met to discuss and review patient's progress in group and individual therapy sessions. Presenting Problem: Bipolar I     Patient's Current Treatment Status: CELIO has been offered info/referral to  PTSD clinic. PTSD tx was encouraged by provider upon discharge. Patient reports  experience, presents as guarded. He recently reestablished contact with his sons. CELIO's 25year old son reported homocidal thoughts with a plan directed toward patient. Patient may be starting a new job soon.     Treatment Start Date: 7/7/2020    Treatment Step-Down Date: N/A    Tentative Discharge Date: 8/13/2020    Patric Graff MS, ATR-BC

## 2020-07-30 ENCOUNTER — HOSPITAL ENCOUNTER (OUTPATIENT)
Dept: PSYCHIATRY | Age: 40
Setting detail: THERAPIES SERIES
Discharge: HOME | End: 2020-07-30
Payer: OTHER GOVERNMENT

## 2020-07-30 VITALS — TEMPERATURE: 98.2 F

## 2020-07-30 VITALS — SYSTOLIC BLOOD PRESSURE: 112 MMHG | HEART RATE: 68 BPM | DIASTOLIC BLOOD PRESSURE: 72 MMHG | RESPIRATION RATE: 16 BRPM

## 2020-07-30 PROCEDURE — 90853 GROUP PSYCHOTHERAPY: CPT

## 2020-07-30 PROCEDURE — 90853 GROUP PSYCHOTHERAPY: CPT | Performed by: COUNSELOR

## 2020-07-30 PROCEDURE — 99215 OFFICE O/P EST HI 40 MIN: CPT | Performed by: PSYCHIATRY & NEUROLOGY

## 2020-07-30 RX ORDER — LAMOTRIGINE 25 MG/1
50 TABLET ORAL 2 TIMES DAILY
Qty: 56 TABLET | Refills: 0 | Status: SHIPPED | OUTPATIENT
Start: 2020-07-30 | End: 2020-08-13 | Stop reason: SDUPTHER

## 2020-07-30 NOTE — GROUP NOTE
Group Therapy Note    Date: 7/30/2020    Group Start Time: 10:00 AM  Group End Time: 11:00 AM  Group Topic: Recovery    [facility]    Carson Newby, [facility]        Group Therapy Note    Attendees: 5            Patient's Goal:  Patient will complete worksheet on boundaries and will discuss how they apply to mental wellbeing. Notes:  Patient attended group. Completed the worksheet and discussed in group. Verbalized a basic understanding of boundaries and gave examples of poor and healthy boundaries. Status After Intervention:  Improved    Participation Level:  Active Listener and Interactive    Participation Quality: Appropriate and Attentive    Speech:  normal    Thought Process/Content: Logical    Affective Functioning: Congruent    Mood: anxious, depressed     Level of consciousness:  Oriented x4    Response to Learning: Able to verbalize current knowledge/experience and Able to verbalize/acknowledge new learning    Endings: None Reported    Modes of Intervention: Education, Support, Socialization and Exploration    Discipline Responsible: /Counselor    Signature:  Carson Newby, [facility]

## 2020-07-30 NOTE — GROUP NOTE
Group Therapy Note    Date: 7/30/2020    Group Start Time: 11:15 AM  Group End Time: 12:00 PM  Group Topic: Psychoeducation    WASHINGTON Groves, [facility]    Group Therapy Note    Attendees: 5    Patients discussed importance of engaging in stress relief activities for relaxation and improving mood. Patients engaged in activity in which they read aloud stress relief cards and discussed ways they could use these skills in their lives to cope with their emotions. Notes:  Pt was quiet though participated in group discussion when prompted. Status After Intervention:  Improved    Participation Level:  Active Listener and Interactive    Participation Quality: Appropriate and Attentive      Speech:  hesitant      Thought Process/Content: Linear      Affective Functioning: Constricted/Restricted      Mood: anxious and depressed      Level of consciousness:  Alert and Oriented x4      Response to Learning: Able to verbalize current knowledge/experience, Able to verbalize/acknowledge new learning and Able to retain information      Endings: None Reported    Modes of Intervention: Education, Support, Socialization, Exploration, Clarifying, Problem-solving and Activity      Discipline Responsible: /Counselor      Signature:  LAZ Kumar, RKATHIE

## 2020-07-30 NOTE — PROGRESS NOTES
Department of Psychiatry  Progress Note    Patient's chart was reviewed. Discussed with treatment team. Met with patient. SUBJECTIVE:    Making gains - starting to write jokes again. Mood and outlook improving. Increased Lamictal to 50mg BID two days ago. Some daytime sedation with increased dose of Lamictal in combination with quetiapine. ROS:   Patient has new complaints: no  Sleeping adequately:  Yes   Appetite adequate: Yes  Engaged in programming: Yes    Did not endorse or describe head aches, SOB, cough, sore throat, chills, chest pain, abdominal pain, neuro problems, bleeding problems, or skin problems. Was moving all four extremities and speaking without difficulty. OBJECTIVE:  VITALS:  Stable  Gait: normal    Mental Status Examination:    Appearance: good grooming and hygiene  Behavior/Attitude toward examiner:  pleasant; good eye contact  Speech: Normal rate, volume, amount  Mood:  \"fine\"  Affect:  blunted     Thought processes:  Goal directed, linear, no YOON or gross disorganization  Thought Content: no SI, no HI, no delusions voiced, no obsessions  Perceptions: no AVH  Attention: attention span and concentration were intact to interview   Abstraction: intact  Cognition:  Alert and oriented to person, place, time, and situation, recall intact  Insight: intact  Judgment: intact     Medication:  Lamictal 50mg BID  Quetiapine 200mg QHS    FORMULATIONS:  This is a domiciled, , unemployed, 59-year-old  with bipolar 1 disorder and cannabis use disorder, who was referred to  our program after an inpatient admission for bipolar roman. The patient  meets criteria for bipolar 1 disorder, most recently manic with  psychotic features. He presents having developed more depressive  symptoms. He is tolerating lithium and Seroquel well and without side  effects. He requires further stabilization in our intensive outpatient  program.    DIAGNOSES:  1.   Bipolar 1 disorder, most recently manic

## 2020-08-04 ENCOUNTER — HOSPITAL ENCOUNTER (OUTPATIENT)
Dept: PSYCHIATRY | Age: 40
Setting detail: THERAPIES SERIES
Discharge: HOME | End: 2020-08-04
Payer: OTHER GOVERNMENT

## 2020-08-04 VITALS — TEMPERATURE: 98.1 F

## 2020-08-04 PROCEDURE — 90853 GROUP PSYCHOTHERAPY: CPT | Performed by: COUNSELOR

## 2020-08-04 PROCEDURE — 90853 GROUP PSYCHOTHERAPY: CPT

## 2020-08-04 NOTE — BH NOTE
Group Therapy Note    Date: 8/4/2020  Start Time: 1:15 pm  End Time:  2:30 pm  Number of Participants: 6    Type of Group: Art Therapy & Psycho-Education    Patient's Goal:  Patients were invited to participate in an Art Therapy / Psycho-Education group on anxiety. Patients were encouraged to watch and discuss the video Me vs. Anxiety by activist Anurag Catalan, which personifies anxiety as an alter-ego. After watching and discussing the video, patients were asked to represent their anxiety, considering what it looks like, feels like, the purpose it serves, and how it is managed. At the end of group, patients were asked to share and process their artwork with the group. Notes:  TJ appeared attentive to the videos shown on anxiety, participated in group discussion, and engaged in art making, exploring his own anxiety. Status After Intervention:  Improved    Participation Level:  Active Listener and Interactive    Participation Quality: Appropriate, Attentive and Sharing      Speech:  normal      Thought Process/Content: Logical      Affective Functioning: Blunted      Mood: anxious      Level of consciousness:  Alert, Oriented x4 and Attentive      Response to Learning: Able to verbalize current knowledge/experience, Able to verbalize/acknowledge new learning, Able to retain information, Capable of insight and Able to change behavior      Endings: None Reported    Modes of Intervention: Education, Support, Socialization, Exploration, Problem-solving, Activity and Media      Discipline Responsible: Registered Nurse      Signature:  Alexandra Logan, [address] [facility]

## 2020-08-04 NOTE — GROUP NOTE
Group Therapy Note    Date: 8/4/2020    Group Start Time:  8:30 AM  Group End Time:  9:45 AM  Group Topic: Psychotherapy    [facility]    Sally Og, [facility]        Group Therapy Note    Attendees: 6         Patient's Goal:  Pt's goal was to be engaged in group. Notes:  Pt was engaged in group. Pt shared that he was having problems finding aftercare. Pt met his goal.     Status After Intervention:  Improved    Participation Level:  Active Listener, Interactive and Minimal    Participation Quality: Appropriate, Attentive and Sharing      Speech:  normal      Thought Process/Content: Logical  Linear      Affective Functioning: Congruent      Mood: euthymic      Level of consciousness:  Alert, Oriented x4 and Attentive      Response to Learning: Able to verbalize current knowledge/experience, Able to verbalize/acknowledge new learning and Progressing to goal      Endings: None Reported    Modes of Intervention: Education, Support, Socialization, Exploration, Clarifying, Problem-solving, Activity, Movement, Confrontation, Limit-setting and Reality-testing      Discipline Responsible: /Counselor      Signature:  Sally Og, [facility]

## 2020-08-06 ENCOUNTER — HOSPITAL ENCOUNTER (OUTPATIENT)
Dept: PSYCHIATRY | Age: 40
Setting detail: THERAPIES SERIES
Discharge: HOME | End: 2020-08-06
Payer: OTHER GOVERNMENT

## 2020-08-06 VITALS — TEMPERATURE: 98.1 F

## 2020-08-06 PROCEDURE — 90853 GROUP PSYCHOTHERAPY: CPT | Performed by: COUNSELOR

## 2020-08-06 PROCEDURE — 90853 GROUP PSYCHOTHERAPY: CPT

## 2020-08-06 NOTE — GROUP NOTE
Group Therapy Note    Date: 8/6/2020    Group Start Time:  8:30 AM  Group End Time:  9:45 AM  Group Topic: Psychotherapy    [facility]    DAMON Santiago MAAT         Group Therapy Note    Attendees: 7         Patient's Goal:  Patient was invited to participate in Psychotherapy group and to set a goal at the beginning of session, practicing in group a new way of being in relationships. Notes:  TJ shared his goal was to [place] more about why I act and feel the way I do\" and \"to engage more and open up. \" Marianne Wilder spoke about working to reconnect with his sons and not be so isolative, but \"I need to have good boundaries up when I do. \" TJ largely listened, but did not participate in discussion. Status After Intervention:  Unchanged    Participation Level:  Active Listener and Interactive    Participation Quality: Appropriate, Attentive and Sharing      Speech:  normal      Thought Process/Content: Logical      Affective Functioning: Blunted      Mood: anxious and depressed      Level of consciousness:  Alert, Oriented x4 and Attentive      Response to Learning: Able to verbalize current knowledge/experience, Able to verbalize/acknowledge new learning, Able to retain information, Capable of insight      Endings: None Reported    Modes of Intervention: Education, Support, Socialization, Exploration and Clarifying      Discipline Responsible: Psychoeducational Specialist      Signature:  STEVE Mari

## 2020-08-06 NOTE — GROUP NOTE
Group Therapy Note    Date: 8/6/2020    Group Start Time: 10:00 AM  Group End Time: 11:00 AM  Group Topic: Recovery    [facility]    Khurram Musa [facility]        Group Therapy Note    Attendees: 7         Patient's Goal:  Patient will complete worksheet on boundaries and will discuss how they apply to mental wellbeing. Notes:  Patient attended group. Completed the worksheet and discussed in group. Verbalized a basic understanding of boundaries and gave examples of poor and healthy boundaries. Status After Intervention:  Improved    Participation Level:  Active Listener and Interactive    Participation Quality: Appropriate and Attentive    Speech:  normal    Thought Process/Content: Logical    Affective Functioning: Congruent    Mood: anxious, depressed     Level of consciousness:  Oriented x4    Response to Learning: Able to verbalize current knowledge/experience and Able to verbalize/acknowledge new learning    Endings: None Reported    Modes of Intervention: Education, Support, Socialization and Exploration    Discipline Responsible: /Counselor    Signature:  Khurram Musa, [facility]

## 2020-08-11 ENCOUNTER — HOSPITAL ENCOUNTER (OUTPATIENT)
Dept: PSYCHIATRY | Age: 40
Setting detail: THERAPIES SERIES
Discharge: HOME | End: 2020-08-11
Payer: OTHER GOVERNMENT

## 2020-08-11 VITALS — TEMPERATURE: 98.2 F

## 2020-08-11 PROCEDURE — 90853 GROUP PSYCHOTHERAPY: CPT | Performed by: COUNSELOR

## 2020-08-11 PROCEDURE — 90853 GROUP PSYCHOTHERAPY: CPT

## 2020-08-11 NOTE — BH NOTE
Patient reports running low on him medications. Approval obtained and medications called into pharmacy of choice.

## 2020-08-11 NOTE — GROUP NOTE
Group Therapy Note    Date: 8/11/2020    Group Start Time: 10:00 AM  Group End Time: 11:00 AM  Group Topic: Recovery    [facility]    Yasmin Monahan, [facility]        Group Therapy Note    Attendees: 4         Patient's Goal:  Patient will complete worksheet on Things I Cannot Control. Will discuss things one can and cannot control. Notes:  Patient engaged well in group. Completed the worksheet and discussed. Gave examples of things that were in/out of the patients control. He talked at length about his attempts to control how others feel about him and how this has effected his life. Status After Intervention:  Improved    Participation Level:  Active Listener and Interactive    Participation Quality: Appropriate, Attentive, Sharing and Supportive    Speech:  normal    Thought Process/Content: Logical  Linear    Affective Functioning: Congruent    Mood: anxious and depressed    Level of consciousness:  Oriented x4    Response to Learning: Able to verbalize current knowledge/experience and Capable of insight    Endings: None Reported    Modes of Intervention: Education, Support, Socialization and Exploration    Discipline Responsible: /Counselor     Signature:  Yasmin Monahan, [facility]

## 2020-08-11 NOTE — BH NOTE
Group Therapy Note    Date: 8/11/2020  Start Time: 1:15 pm  End Time:  2:25 pm  Number of Participants: 4    Type of Group: Art Therapy    Patient's Goal:  Patient was invited to participate in an Art Therapy group on relationships. Patient was invited to engage in group discussion on characteristics of healthy vs. unhealthy relationships and to reflect on the relationships in their lives through writing. Patients were asked write about their relationships and to process how to have and maintain healthy boundaries. At the end of group, patients were encouraged to share and process their work and to reflect on their relationships. Notes:  TJ participated in group discussion on relationships and boundaries, participated in reflective writing activity, and processed work with the group. Status After Intervention:  Unchanged    Participation Level:  Active Listener and Interactive    Participation Quality: Appropriate, Attentive and Sharing      Speech:  normal      Thought Process/Content: Logical      Affective Functioning: Blunted      Mood: depressed      Level of consciousness:  Alert, Oriented x4 and Attentive      Response to Learning: Able to verbalize current knowledge/experience, Able to verbalize/acknowledge new learning, Able to retain information, Capable of insight and Able to change behavior      Endings: None Reported    Modes of Intervention: Education, Support, Socialization, Exploration, Clarifying and Problem-solving      Discipline Responsible: Psychoeducational Specialist      Signature:  STEVE Hernandez

## 2020-08-11 NOTE — GROUP NOTE
Group Therapy Note    Date: 8/11/2020    Group Start Time: 11:15 AM  Group End Time: 12:15 PM  Group Topic: Relapse Prevention    [facility]    Chip Pollard, [facility]    Group Therapy Note    Attendees: 4    Patients learned about the 5 key concepts of recovery and utilized art supplies to create their recovery house to represent their values, supports, safety, motivations, inspirations, changes they want to make in their life, and identified coping skills to help increase their positive emotions. Patients discussed and explored their recovery houses as they created them. Notes:  Pt was engaged in group activity and participated in group discussion with peers. Status After Intervention:  Improved    Participation Level:  Active Listener and Interactive    Participation Quality: Appropriate      Speech:  normal      Thought Process/Content: Linear      Affective Functioning: Flat and Constricted/Restricted      Mood: anxious      Level of consciousness:  Alert and Oriented x4      Response to Learning: Able to verbalize current knowledge/experience, Able to verbalize/acknowledge new learning and Able to retain information    Endings: None Reported    Modes of Intervention: Education, Support, Socialization, Exploration, Clarifying, Problem-solving and Activity      Discipline Responsible: /Counselor      Signature:  RADHA Goodman-S, R-GUS

## 2020-08-11 NOTE — GROUP NOTE
Group Therapy Note    Date: 8/11/2020    Group Start Time:  8:30 AM  Group End Time:  9:45 AM  Group Topic: Psychotherapy    [facility]    Sally Og, [facility]        Group Therapy Note    Attendees: 4         Patient's Goal:  Pt's goal was to to be engaged in group. Notes:  Pt was engaged in group. Pt gave feedback and was supportive. Pt met his goal.     Status After Intervention:  Improved    Participation Level:  Active Listener and Interactive    Participation Quality: Appropriate, Attentive, Sharing and Supportive      Speech:  normal      Thought Process/Content: Logical  Linear      Affective Functioning: Congruent      Mood: euthymic      Level of consciousness:  Alert, Oriented x4 and Attentive      Response to Learning: Able to verbalize current knowledge/experience and Progressing to goal      Endings: None Reported    Modes of Intervention: Education, Support, Socialization, Exploration, Clarifying, Problem-solving, Activity, Movement, Confrontation, Limit-setting and Reality-testing      Discipline Responsible: /Counselor      Signature:  Sally Og, [facility]

## 2020-08-12 NOTE — BH NOTE
Treatment team met today to discuss patient's progress, goals and plan. Patient is scheduled to discharge from [facility] on Thursday this week 8/13/20. The team feels like he is ready. Therapy staff provided patient with a list of bipolar support groups. He has been referred to the PTSD clinic through . He is aware and is making attempts to set up services. He does have follow up care scheduled through the [facility]. Plan is for patient to discharge on 8/13/20.

## 2020-08-13 ENCOUNTER — HOSPITAL ENCOUNTER (OUTPATIENT)
Dept: PSYCHIATRY | Age: 40
Setting detail: THERAPIES SERIES
Discharge: HOME | End: 2020-08-13
Payer: OTHER GOVERNMENT

## 2020-08-13 VITALS
SYSTOLIC BLOOD PRESSURE: 111 MMHG | DIASTOLIC BLOOD PRESSURE: 66 MMHG | RESPIRATION RATE: 18 BRPM | TEMPERATURE: 97.9 F | HEART RATE: 62 BPM

## 2020-08-13 PROCEDURE — 99215 OFFICE O/P EST HI 40 MIN: CPT | Performed by: PSYCHIATRY & NEUROLOGY

## 2020-08-13 PROCEDURE — 90853 GROUP PSYCHOTHERAPY: CPT | Performed by: COUNSELOR

## 2020-08-13 PROCEDURE — 90853 GROUP PSYCHOTHERAPY: CPT

## 2020-08-13 RX ORDER — QUETIAPINE FUMARATE 100 MG/1
100 TABLET, FILM COATED ORAL NIGHTLY
Qty: 30 TABLET | Refills: 0 | Status: SHIPPED | OUTPATIENT
Start: 2020-08-13 | End: 2020-09-03 | Stop reason: SDUPTHER

## 2020-08-13 RX ORDER — LAMOTRIGINE 100 MG/1
100 TABLET ORAL 2 TIMES DAILY
Qty: 60 TABLET | Refills: 0 | Status: SHIPPED | OUTPATIENT
Start: 2020-08-13 | End: 2020-09-03 | Stop reason: SDUPTHER

## 2020-08-13 NOTE — PROGRESS NOTES
Department of Psychiatry  Progress Note    Patient's chart was reviewed. Discussed with treatment team. Met with patient. SUBJECTIVE:    Continues to make gains in mood. Is more connected with family including children. Is writing more. Taking better care of self. Has many great questions about Bipolar Disorder - discussed at length. Has not had thoughts of suicide. Continues to use cannabis 2x daily. Discussed this at length as well. ROS:   Patient has new complaints: no  Sleeping adequately:  Yes   Appetite adequate: Yes  Engaged in programming: Yes    Did not endorse or describe head aches, SOB, cough, sore throat, chills, chest pain, abdominal pain, neuro problems, bleeding problems, or skin problems. Was moving all four extremities and speaking without difficulty. OBJECTIVE:  VITALS:  Stable  Gait: normal    Mental Status Examination:    Appearance: good grooming and hygiene  Behavior/Attitude toward examiner:  pleasant; good eye contact  Speech: Normal rate, volume, amount  Mood:  \"better\"  Affect:  blunted     Thought processes:  Goal directed, linear, no YOON or gross disorganization  Thought Content: no SI, no HI, no delusions voiced, no obsessions  Perceptions: no AVH  Attention: attention span and concentration were intact to interview   Abstraction: intact  Cognition:  Alert and oriented to person, place, time, and situation, recall intact  Insight: intact  Judgment: intact     Medication:  Lamictal 75mg BID  Quetiapine 100mg QHS    FORMULATIONS:  This is a domiciled, , unemployed, 79-year-old  with bipolar 1 disorder and cannabis use disorder, who was referred to  our program after an inpatient admission for bipolar roman. The patient  met criteria for bipolar 1 disorder, most recently manic with  psychotic features. DIAGNOSES:  1. Bipolar 1 disorder, most recently manic with psychotic features. 2.  Cannabis use disorder, severe.     PLAN:  1. intensive outpatient program for stabilization and treatment. 2.  On admission, continued lithium 450 mg twice daily and Seroquel 200 mg p.o. q.h.s.. Goal is monotherapy with lithium once therapeutic. Discussed this at length with the patient. 7/9/2020 - Lithium level 0.5 - increased Lithium to 600mg BID. TSH elevated. 7/15/2020 -. Lithium level 0.8. However, his TSH continued to increase and was 15.36, up from 8.63 on 7/8. His free T4 on 7/15 was normal at 1.2    7/16/2020 - discontinued lithium, increased quetiapine in the short-term, and started Lamictal 25mg daily. Goal is monotherapy with Lamictal.     7/23/2020 - increased Lamictal to 25mg BID. Continued quetiapine 200mg QHS    7/30/2020 - Lamictal increased to 50mg BID two days ago. Reduced Quetiapine to 100mg QHS. 8/13/2020 - Lamictal increased to 75mg BID last week. Increase to 100mg BID today. Continue Quetiapine 100mg QHS for now. Consider discontinuing at next visit. 3.  Safety plan - call 911 if he should develop thoughts of self-harm. 4.  Follow up with me in two weeks for bridge appt. .      Total time with patient was 40 minutes and more than 50 % of that time was spent counseling the patient on their symptoms, treatment, and expected goals.      Laura Verdin MD, Dania Durán MBA

## 2020-08-13 NOTE — GROUP NOTE
Group Therapy Note    Date: 8/13/2020    Group Start Time:  8:30 AM  Group End Time:  9:45 AM  Group Topic: Psychotherapy    [facility]    Alexandra Logan, ATR, MAJACKY        Group Therapy Note    Attendees: 5         Patient's Goal:  Patient was invited to participate in Psychotherapy group and to set a goal at the beginning of session to practice in group a new way of being in relationships. Notes:  TJ shared his goal was to [facility] and enjoy my last day. \" Meredeth Min appeared attentive to discussion and participated only when directly prompted by therapist, sharing, \"It makes me grateful that I haven't experienced any deaths in my family. \"    Status After Intervention:  Unchanged    Participation Level:  Active Listener and Interactive    Participation Quality: Appropriate, Attentive, and Sharing      Speech:  normal      Thought Process/Content: Logical      Affective Functioning: Blunted      Mood: anxious      Level of consciousness:  Alert, Oriented x4 and Attentive      Response to Learning: Able to verbalize current knowledge/experience, Able to verbalize/acknowledge new learning, Able to retain information, Capable of insight       Endings: None Reported    Modes of Intervention: Education, Support, Socialization, Exploration, Clarifying and Problem-solving      Discipline Responsible: Psychoeducational Specialist      Signature:  Caity Rivera, [facility]

## 2020-08-13 NOTE — GROUP NOTE
Group Therapy Note    Date: 8/13/2020    Group Start Time: 10:00 AM  Group End Time: 11:00 AM  Group Topic: Psychoeducation    [facility]    Claudell Rumble, [facility]    Group Therapy Note    Attendees: 6    Patients learned about the skill of radical acceptance and read aloud the handouts about this skill together as a group. Patients discussed ways that they could use this skill to support them in their recovery. Notes:  Pt was actively engaged in group discussion and shared with peers appropriately. Status After Intervention:  Improved    Participation Level:  Active Listener and Interactive    Participation Quality: Appropriate and Attentive      Speech:  normal      Thought Process/Content: Logical  Linear      Affective Functioning: Constricted/Restricted      Mood: anxious      Level of consciousness:  Alert and Oriented x4      Response to Learning: Able to verbalize current knowledge/experience, Able to verbalize/acknowledge new learning and Able to retain information      Endings: None Reported    Modes of Intervention: Education, Support, Socialization, Exploration, Clarifying, Problem-solving and Activity      Discipline Responsible: /Counselor      Signature:  Claudell Rumble, LPCC-S, BENIGNO

## 2020-08-13 NOTE — BH NOTE
Group Therapy Note    Date: 8/13/2020  Start Time: 1:15 pm  End Time:  2:20 pm  Number of Participants: 6    Type of Group: Cognitive Skills    Patient's Goal: Patients were invited to participate in a Cognitive Skills group where patients discussed and engaged in positive, interpersonal relationships through a web building activity. Patients were invited to toss balls of string to one another to create a web, sharing what they learned from and about one another, giving positive affirmations, and identifying relationships they were grateful for. Patients who were graduating from [facility] were encouraged to process their experiences while in [facility] and all participants were invited to take a piece of the web home with them. Notes:  TJ was an active participant in the group web building activity, gave supportive feedback to peers, and appeared receptive to the affirmations given to him by peers. Status After Intervention:  Improved    Participation Level:  Active Listener and Interactive    Participation Quality: Appropriate, Attentive, Sharing and Supportive      Speech:  normal      Thought Process/Content: Logical      Affective Functioning: Congruent      Mood: euthymic      Level of consciousness:  Alert, Oriented x4 and Attentive      Response to Learning: Able to verbalize current knowledge/experience, Able to verbalize/acknowledge new learning, Able to retain information, Capable of insight and Able to change behavior      Endings: None Reported    Modes of Intervention: Education, Support, Socialization, Exploration, Clarifying, Activity and Movement      Discipline Responsible: Psychoeducational Specialist      Signature:  Burney Severance, MAAT

## 2020-08-26 ENCOUNTER — HOSPITAL ENCOUNTER (OUTPATIENT)
Age: 40
Discharge: HOME | End: 2020-08-26
Payer: OTHER GOVERNMENT

## 2020-08-26 PROCEDURE — 84443 ASSAY THYROID STIM HORMONE: CPT

## 2020-08-26 PROCEDURE — 36415 COLL VENOUS BLD VENIPUNCTURE: CPT

## 2020-08-27 ENCOUNTER — HOSPITAL ENCOUNTER (OUTPATIENT)
Dept: PSYCHIATRY | Age: 40
Setting detail: THERAPIES SERIES
Discharge: HOME | End: 2020-08-27
Payer: OTHER GOVERNMENT

## 2020-08-27 VITALS
HEART RATE: 98 BPM | DIASTOLIC BLOOD PRESSURE: 75 MMHG | SYSTOLIC BLOOD PRESSURE: 132 MMHG | RESPIRATION RATE: 18 BRPM | TEMPERATURE: 98.3 F

## 2020-08-27 LAB — TSH SERPL DL<=0.05 MIU/L-ACNC: 3.98 UIU/ML (ref 0.27–4.2)

## 2020-08-27 PROCEDURE — 99215 OFFICE O/P EST HI 40 MIN: CPT | Performed by: PSYCHIATRY & NEUROLOGY

## 2020-08-28 NOTE — PROGRESS NOTES
Department of Psychiatry  Progress Note    Patient's chart was reviewed. Met with patient. SUBJECTIVE:    Reports some racing thoughts and insomnia but does not feel rested in the morning and is tired in the daytime. 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 Is worried these are manic symptoms. Discussed this at length. He does not have other symptoms of roman and feels his mood remains depressed (no euphoria, no irritability). Fatigue in the daytime. Tolerating increased dose of Lamictal.     ROS:   Patient has new complaints: no  Sleeping adequately:  Yes   Appetite adequate: Yes  Engaged in programming: Yes    Did not endorse or describe head aches, SOB, cough, sore throat, chills, chest pain, abdominal pain, neuro problems, bleeding problems, or skin problems. Was moving all four extremities and speaking without difficulty. OBJECTIVE:  Vitals:    08/27/20 1100   BP: 132/75   Pulse: 98   Resp: 18   Temp: 98.3 °F (36.8 °C)     Gait: normal    Mental Status Examination:    Appearance: good grooming and hygiene  Behavior/Attitude toward examiner:  pleasant; good eye contact  Speech: Normal rate, volume, amount  Mood:  \"down\"  Affect:  blunted     Thought processes:  Goal directed, linear, no YOON or gross disorganization  Thought Content: no SI, no HI, no delusions voiced, no obsessions  Perceptions: no AVH  Attention: attention span and concentration were intact to interview   Abstraction: intact  Cognition:  Alert and oriented to person, place, time, and situation, recall intact  Insight: intact   Judgment: intact     Medication:  Lamictal 100mg BID  Quetiapine 100mg QHS    FORMULATIONS:  This is a domiciled, , unemployed, 44-year-old  with bipolar 1 disorder and cannabis use disorder, who was referred to  our program after an inpatient admission for bipolar roman. The patient  met criteria for bipolar 1 disorder, most recently manic with  psychotic features.       He completed IOP and presents for a bridge appt. DIAGNOSES:  1. Bipolar 1 disorder, most recently manic with psychotic features. 2.  Cannabis use disorder, severe. PLAN:  1. Bridge appt. 2.  On admission, continued lithium 450 mg twice daily and Seroquel 200 mg p.o. q.h.s.. Goal is monotherapy with lithium once therapeutic. Discussed this at length with the patient. 7/9/2020 - Lithium level 0.5 - increased Lithium to 600mg BID. TSH elevated. 7/15/2020 -. Lithium level 0.8. However, his TSH continued to increase and was 15.36, up from 8.63 on 7/8. His free T4 on 7/15 was normal at 1.2    7/16/2020 - discontinued lithium, increased quetiapine in the short-term, and started Lamictal 25mg daily. Goal is monotherapy with Lamictal.     7/23/2020 - increased Lamictal to 25mg BID. Continued quetiapine 200mg QHS    7/30/2020 - Lamictal increased to 50mg BID two days ago. Reduced Quetiapine to 100mg QHS. 8/13/2020 - Lamictal increased to 75mg BID last week. Increased to 100mg BID today. Continued Quetiapine 100mg QHS for now. Consider discontinuing Quetiapine at next visit. 8/28/2020 - consolidate Lamictal 200mg QHS. 3.  Safety plan - call 911 if he should develop thoughts of self-harm. 4.  Follow up with me in one week.      Total time with patient was 40 minutes and more than 50 % of that time was spent counseling the patient on their symptoms, treatment, and expected goals.      Holli Avilez MD, [address] [place] [address],[facility], [place]

## 2020-09-03 ENCOUNTER — HOSPITAL ENCOUNTER (OUTPATIENT)
Dept: PSYCHIATRY | Age: 40
Setting detail: THERAPIES SERIES
Discharge: HOME | End: 2020-09-03
Payer: OTHER GOVERNMENT

## 2020-09-03 VITALS
TEMPERATURE: 96.9 F | HEART RATE: 67 BPM | SYSTOLIC BLOOD PRESSURE: 128 MMHG | RESPIRATION RATE: 14 BRPM | DIASTOLIC BLOOD PRESSURE: 68 MMHG

## 2020-09-03 PROCEDURE — 99215 OFFICE O/P EST HI 40 MIN: CPT | Performed by: PSYCHIATRY & NEUROLOGY

## 2020-09-03 RX ORDER — QUETIAPINE FUMARATE 100 MG/1
100 TABLET, FILM COATED ORAL NIGHTLY PRN
Qty: 20 TABLET | Refills: 0 | Status: SHIPPED | OUTPATIENT
Start: 2020-09-03 | End: 2020-09-24 | Stop reason: SDUPTHER

## 2020-09-03 RX ORDER — LAMOTRIGINE 200 MG/1
200 TABLET ORAL EVERY EVENING
Qty: 60 TABLET | Refills: 2 | Status: SHIPPED | OUTPATIENT
Start: 2020-09-03 | End: 2020-09-03 | Stop reason: SDUPTHER

## 2020-09-03 RX ORDER — LAMOTRIGINE 200 MG/1
200 TABLET ORAL EVERY EVENING
Qty: 30 TABLET | Refills: 2 | Status: SHIPPED | OUTPATIENT
Start: 2020-09-03 | End: 2020-12-02

## 2020-09-03 NOTE — PROGRESS NOTES
Department of Psychiatry  Progress Note    Patient's chart was reviewed. Met with patient. SUBJECTIVE:    Doing well. 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 Tolerating Lamictal well and without side effects. Some weight gain likely 2/2 scheduled quetiapine. ROS:   Patient has new complaints: no  Sleeping adequately:  Yes   Appetite adequate: Yes    Does not endorse or describe head aches, SOB, cough, sore throat, chills, chest pain, abdominal pain, neuro problems, bleeding problems, or skin problems. Was moving all four extremities and speaking without difficulty. OBJECTIVE:  Vitals:    09/03/20 1100   BP: 128/68   Pulse: 67   Resp: 14   Temp: 96.9 °F (36.1 °C)     Gait: normal    Mental Status Examination:    Appearance: good grooming and hygiene  Behavior/Attitude toward examiner:  pleasant; good eye contact  Speech: Normal rate, volume, amount  Mood:  \"better\"  Affect:  mood congruent    Thought processes:  Goal directed, linear, no YOON or gross disorganization  Thought Content: no SI, no HI, no delusions voiced, no obsessions  Perceptions: no AVH  Attention: attention span and concentration were intact to interview   Abstraction: intact  Cognition:  Alert and oriented to person, place, time, and situation, recall intact  Insight: intact   Judgment: intact     Medication:  Lamictal 200mg QHS  Quetiapine 100mg QHS    FORMULATIONS:  This is a domiciled, , unemployed, 63-year-old  with bipolar 1 disorder and cannabis use disorder, who was referred to  our program after an inpatient admission for bipolar roman. The patient  met criteria for bipolar 1 disorder, most recently manic with  psychotic features. He completed out IOP program and presents for a bridge appt. DIAGNOSES:  1. Bipolar 1 disorder, most recently manic with psychotic features. 2.  Cannabis use disorder, severe. PLAN:  1.  Bridge appointment. 2.  On admission, continued lithium 450 mg twice daily and Seroquel 200 mg p.o. q.h.s.. Goal is monotherapy with lithium once therapeutic. Discussed this at length with the patient. 7/9/2020 - Lithium level 0.5 - increased Lithium to 600mg BID. TSH elevated. 7/15/2020 -. Lithium level 0.8. However, his TSH continued to increase and was 15.36, up from 8.63 on 7/8. His free T4 on 7/15 was normal at 1.2    7/16/2020 - discontinued lithium, increased quetiapine in the short-term, and started Lamictal 25mg daily. Goal is monotherapy with Lamictal.     7/23/2020 - increased Lamictal to 25mg BID. Continued quetiapine 200mg QHS    7/30/2020 - Lamictal increased to 50mg BID two days ago. Reduced Quetiapine to 100mg QHS. 8/13/2020 - Lamictal increased to 75mg BID last week. Increased to 100mg BID today. Continued Quetiapine 100mg QHS for now. Consider discontinuing Quetiapine at next visit. 8/28/2020 - consolidate Lamictal 200mg QHS.      9/3/2020 - euthymic. Change Quetiapine from scheduled to PRN. Goal is monotherapy with Lamictal.     3.  Safety plan - call 911 if he should develop thoughts of self-harm. 4.  Follow up with me in three weeks.  Sooner prn.      Fabricio Wise MD, [address] [place] [address],[address], [place]

## 2020-09-24 ENCOUNTER — HOSPITAL ENCOUNTER (OUTPATIENT)
Dept: PSYCHIATRY | Age: 40
Setting detail: THERAPIES SERIES
Discharge: HOME | End: 2020-09-24
Payer: OTHER GOVERNMENT

## 2020-09-24 VITALS
HEART RATE: 67 BPM | SYSTOLIC BLOOD PRESSURE: 139 MMHG | RESPIRATION RATE: 16 BRPM | TEMPERATURE: 97.7 F | DIASTOLIC BLOOD PRESSURE: 84 MMHG

## 2020-09-24 PROCEDURE — 99215 OFFICE O/P EST HI 40 MIN: CPT | Performed by: PSYCHIATRY & NEUROLOGY

## 2020-09-24 RX ORDER — QUETIAPINE FUMARATE 100 MG/1
100 TABLET, FILM COATED ORAL NIGHTLY PRN
Qty: 20 TABLET | Refills: 1 | Status: SHIPPED | OUTPATIENT
Start: 2020-09-24

## 2020-09-24 NOTE — PROGRESS NOTES
Department of Psychiatry  Progress Note    Patient's chart was reviewed. Met with patient. SUBJECTIVE:    Continues to do well. Enrolled in the [facility] trauma programing - is now in his fourth week. We discussed this at length. He will complete the program and then focus on individual therapy focused on mood stability going forward. Also scheduled individually therapy to start today. Feels that QHS consolidated dose of Lamictal has gone well. Has missed 0 doses. Has been 6-days without cannabis - also going well. Has taken prn Quetiapine 4-5x in the last two weeks. ROS:   Patient has new complaints: no  Sleeping adequately:  Yes   Appetite adequate: Yes    Does not endorse or describe head aches, SOB, cough, sore throat, chills, chest pain, abdominal pain, neuro problems, bleeding problems, or skin problems. Was moving all four extremities and speaking without difficulty. OBJECTIVE:  Vitals:    09/24/20 1115   BP: 139/84   Pulse: 67   Resp: 16   Temp: 97.7 °F (36.5 °C)     Gait: normal    Mental Status Examination:    Appearance: good grooming and hygiene  Behavior/Attitude toward examiner:  pleasant; good eye contact  Speech: Normal rate, volume, amount  Mood:  \"better\"  Affect:  mood congruent    Thought processes:  Goal directed, linear, no YOON or gross disorganization  Thought Content: no SI, no HI, no delusions voiced, no obsessions  Perceptions: no AVH  Attention: attention span and concentration were intact to interview   Abstraction: intact  Cognition:  Alert and oriented to person, place, time, and situation, recall intact  Insight: intact   Judgment: intact     Medication:  Lamictal 200mg QHS  Quetiapine 100mg QHS PRN    FORMULATIONS:  This is a domiciled, , unemployed, 68-year-old  with bipolar 1 disorder and cannabis use disorder, who was referred to  our program after an inpatient admission for bipolar roman.   The patient  met criteria for bipolar 1 disorder, most recently manic with  psychotic features. He completed out IOP program and presents for a bridge appt. DIAGNOSES:  1. Bipolar 1 disorder, most recently manic with psychotic features. 2.  Cannabis use disorder, in short-term remission     PLAN:  1. Bridge appointment. 2.  On admission, continued lithium 450 mg twice daily and Seroquel 200 mg p.o. q.h.s.. Goal is monotherapy with lithium once therapeutic. Discussed this at length with the patient. 7/9/2020 - Lithium level 0.5 - increased Lithium to 600mg BID. TSH elevated. 7/15/2020 -. Lithium level 0.8. However, his TSH continued to increase and was 15.36, up from 8.63 on 7/8. His free T4 on 7/15 was normal at 1.2    7/16/2020 - discontinued lithium, increased quetiapine in the short-term, and started Lamictal 25mg daily. Goal is monotherapy with Lamictal.     7/23/2020 - increased Lamictal to 25mg BID. Continued quetiapine 200mg QHS    7/30/2020 - Lamictal increased to 50mg BID two days ago. Reduced Quetiapine to 100mg QHS. 8/13/2020 - Lamictal increased to 75mg BID last week. Increased to 100mg BID today. Continued Quetiapine 100mg QHS for now. Consider discontinuing Quetiapine at next visit. 8/28/2020 - consolidate Lamictal 200mg QHS.      9/3/2020 - euthymic. Change Quetiapine from scheduled to PRN. Goal is monotherapy with Lamictal.     9/24/2020 - euthymic     3. Safety plan - call 911 if he should develop thoughts of self-harm. 4.  Follow up with me in two months weeks.  Sooner prn.      Agata Ramirez MD, [place]

## 2020-10-08 NOTE — PRE-CERTIFICATION NOTE
Everton Rodrigues from PCP office called with Ref Samy Bryson # [phone] until 01/23/2021  - no number of visits given. PCP office was asked to confirm that this referral is for Physical Therapy & obtain the number of visits approved before 10/09/2020.

## 2020-10-09 ENCOUNTER — HOSPITAL ENCOUNTER (OUTPATIENT)
Dept: PHYSICAL THERAPY | Age: 40
Setting detail: THERAPIES SERIES
Discharge: HOME | End: 2020-10-09
Payer: OTHER GOVERNMENT

## 2020-10-09 PROCEDURE — 97140 MANUAL THERAPY 1/> REGIONS: CPT

## 2020-10-09 PROCEDURE — 97163 PT EVAL HIGH COMPLEX 45 MIN: CPT

## 2020-10-09 PROCEDURE — 97110 THERAPEUTIC EXERCISES: CPT

## 2020-10-09 NOTE — PROGRESS NOTES
The following patient has been evaluated for physical therapy services. In order for therapy to continue, Medicare requires physician review of the treatment plan. Please review the attached evaluation and/or summary of the patient's plan of care, and verify that you agree by signing below and sending it back to our office. Thank you for this referral.    Physician signature_______________________ Date________________    Fax to:   [facility] [phone]           LOWER QUARTER PHYSICAL THERAPY EVALUATION  And Treatment Note      Evaluation Date: 10/9/2020       Patient Name: Alpa Bartholomew     YOB: 1980      Medical Diagnosis/ ICD 10:   Chronic bilat low back pain M54.5 ; Injury of RIGHT shoulder, sequela S49.91xs  Treatment Diagnosis:      Onset Date:  Referral Date:  9/29/20    Referring Physician:   Geno Abbott DO ;  Shaheen Alba DO     Insurance/Certification Information/Allowed visits:  [facility];  Ref [facility] # [phone] until 01/23/2021  - 16 visits or Units until 1/2021    Restrictions/Precautions:  Latex Allergy:   []Yes   [x]No      Health History reviewed with pt:     [x]Yes :  Back surgery, Shoulder surgery  []No        Preferred Language for HealthCare:   [x]English   []Other:         SUBJECTIVE FINDINGS      History of Present Illness:  Pt states that he is a  , 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  8 jumps from the plane. He retired from [employer] due to pain and was hired by the [employer]. Worked there for 12 years from 2006-18 . Has had multiple surgeries including his back and shoulder. Shoulder surgery was for a torn labrum. States that he is no longer taking any of his pain medications.   Had a recent suicide attempt with admission to the [facility] unit and that coming to PT is part of his recovery plan, established with his psyche team.    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 Understands that the proof of disability will require a completely separate appointment, and likely an FCE. Pt currently presents with C/o back pain and shoulder pain. Has come off of all of his medicines so he is starting to feel His pain more. Presents with script for back and shoulder pain. His back is his primary concern today, so this will be the focus of this Evaluation. Pain       Patient reports pain is  7/10 pain. Left past calf. Worse with bending. Right to glutes. MRI was done in 2017. Will get to a 10 at times. An 5-8 in the last two weeks. Pain increases with : bending forward    Decreases with: stretching helps. Pt. reports pain with coughing, sneezing and laughing:    []Yes   [x]No   []NA   Pt. reports bowel and bladder changes:      []Yes   [x]No   []NA   Pt. reports knee/hip/ankle buckling:      [x]Yes  - Left leg []No   []NA     Current Functional Limitations:   [x]Yes   []No  Functional complaints: unable to do things normal people do. Sit longer than a few minutes. Go to the gym . Put pants and socks on. Wears slip on shoes. PLOF:   [x]No functional limitations   []Pre-existing limitations :   Pt's sleep is affected? [x]Yes pillows below knees to get thorugh the night. Patient goal for therapy:  \"to be able to do everyday things without having to always think about how its going to affect my back first\"          OBJECTIVE FINDINGS       Gait/Steps/Balance       [x]Gait WNL                             []Deviations on a level linoleum surface include:      Posture: [x] WNL  [x]Forward head   [x]Forward flexed trunk. Unable to achieve full upright extension.     []Scoliosis   []Decreased WB on   []R   []L     []Other:       Quick Tests/Functional Myotome Tests:   Heel Walk (L4):      []NT  [x]Able to perform WNL   []Unable to perform         Toe Walk (S1):       []NT  [x]Able to perform WNL   []Unable to perform        Lumbar Range of MotionTesting      [x]All WFL except as marked below  ROM  Deficits         *denotes pain AROM  (% Decreased) COMMENTS   Flexion 50 Flexed forward, unable to achieve full upright   Extension 100 Unable to tolerate extension   Sidebending Left 90    Sidebending Right 90    Rotation Left  100 []Seated  [x]Standing       Rotation Right 100 []Seated  [x]Standing         Special Tests- Standing   (C)= Ilanka's Criteria: 3/4 Positive tests or (+) Fortins sign with two additional (+) tests  Special Test Abnormal Findings   Karli's Sign                (C)                  [x]Neg   []Pos R   []Pos L      Standing Landmarks [x]Iliac Crests Equal   []R High  []L High  []PSIS Equal   []R High  []L High  []ASIS Equal   []R High  []L High     []Difficult to assess due to body habitus    Standing Flexion Test  (C) []Neg   []Pos R   [x]Pos L      March Test (Gillet's Test) []Neg   []Pos R   []Pos L      Sacral Sulci Test  []Neg   []Pos R   [x]Pos L          Special Tests- seated     Special Test Abnormal Findings   Seated pelvic landmarks (C) [x]Iliac Crests Equal   []R High   []L High  []PSIS Equal   []R High  []L High  []Difficult to assess due to body habitus   Sitting flexion test  []Neg   []Pos R   []Pos L      Hip IR PROM  []WNL []Pos R    []Pos L         Slump test/Dural tension  []Neg   []Pos R   [x]Pos L          Deep Tendon Reflexes     [x]All reflexes WNL (2+) except as marked below  Abnormal Reflex Findings Left Right Comments   Luis's Reflex      Biceps (C5,6) 1+ 1+    Brachioradialis (C6) 1+ 1+    Triceps (C7)      Quadriceps (L3,4)   Unable to provoke Unable to provoke []Pendular x 3 R  []Pendular x 3 L   Achilles (S1,2) 2+ 2+    Ankle clonus , # of beats Neg Neg    Babinski's reflex           Dermatomal Sensation   [x]All dermatomes WFL for light touch except as marked below  Abnormal Dermatome Findings Left Right Anterior groin, 2-3 inches below ASIS (L1-L2)     Middle third anterior thigh (L3)     Patella and med malleolus (L4)     Fibular head and dorsum of foot (L5)     Lateral side and plantar surface of foot (S1)     Medial aspect of posterior thigh (S2)                   Range of Motion/Strength Testing-Myotomes    [x]All ROM WFL except as marked below   [x]All strength WFL (5/5) except as marked below    [x]All myotomes WFL (5/5) except as marked below     Range Tested MMT/ Resisted PROM AROM Comments   *denotes pain Left Right Left Right Left Right    Hip Flexion  (L1-2) 5 5        Hip Extension          Hip Abduction  (L5) 5 5        Hip Adduction  (L3) 3- 3-        Hip IR          Hip ER          Knee Flexion  (L5,S1) 3+ 3+        Knee Extension  (L3,4) 3+ 3+        Ankle Dorsiflex  (L4) 4+ 4+        Ankle Plantarflex  (S1,2)          Ankle Inversion          Ankle Eversion          Great Toe Ext  (L5)            [x] Not Tested  Trunk Strength     Trunk Extensors Too painful    Gluteals 3-    Abdominals 3-        Flexibility    [] All tested [facility]    [] Deficits indicated as follows:    Muscle Abnormal Findings   Hip flexors/Miguel  [x]Decreased R   [x]Decreased L      Hamstrings  Degrees in 90/90 [x]Decreased R   [x]Decreased L     Right:              Left:      Gastrocs   []Decreased R   []Decreased L      Obers/TFL/ITB   []Decreased R   []Decreased L      Piriformis    []Decreased R   []Decreased L      Other:    []Decreased R   []Decreased L        Special Tests Lumbosacral and hip- supine/sidelying/prone    (L) = Laslett's Criteria: 2 positive tests  Special Test Abnormal Findings   Sit up test/ Supine Long sit test  (C) []Neg   [x]Pos R   []Pos L     Comments: Short to long   SI distraction                               (L) []Neg   [x]Pos   []NT   Thigh Thrust test                         (L) []Neg   []Pos R   []Pos L      90/90 test  []Neg   []Pos R   []Pos L      Gaenslen's test []Neg   []Pos R   []Pos L Thank you for the referral of this patient.       Timed Code Treatment Minutes:   25  minutes   Total Treatment Time:  45  minutes    Plan of care sent to provider:      [x]Faxed  []Co-signature    (attempts: 1[x] 2 []3[])           Ghislaine Schuler PT, MPT, OMT-c [id number]

## 2020-10-12 ENCOUNTER — HOSPITAL ENCOUNTER (OUTPATIENT)
Dept: PHYSICAL THERAPY | Age: 40
Setting detail: THERAPIES SERIES
Discharge: HOME | End: 2020-10-12
Payer: OTHER GOVERNMENT

## 2020-10-12 NOTE — PROGRESS NOTES
Physical Therapy  Cancellation/No-show Note    Patient Name:  Alexander Dewitt  :  1980   Date:  10/12/2020  Cancels to Date: 1  No-shows to Date: 0    For today's appointment patient:  [x]  Cancelled  []  Rescheduled appointment  []  No-show     Reason given by patient:  []  Patient ill  []  Conflicting appointment  []  No transportation    []  Conflict with work  []  No reason given  [x]  Other:     Comments:     Pt arrived at 11:30 for his 4:30 appt this date. A 12:45 option was offered,  but  He was unable to wait. We did confirm his next visit , next week. Regarding his status,  Pt did report feeling better and presented with an improved gait pattern/ stance. Pt states he was sore after his eval and had a rough night, but over the next days he began to improve.       Electronically signed by:  Elana Singh, PT, MPT, OMT-c [id number]

## 2020-10-12 NOTE — PROGRESS NOTES
Physical Therapy  Patient came in at 11:15 for his appointment on 10/12/2020 but his appointment was at 4:30. Patient said he had his dates wrong and will be back next week to see Chase Nascimento for his next visit and needed to cancel for today.

## 2020-10-20 ENCOUNTER — HOSPITAL ENCOUNTER (OUTPATIENT)
Dept: PHYSICAL THERAPY | Age: 40
Setting detail: THERAPIES SERIES
Discharge: HOME | End: 2020-10-20
Payer: OTHER GOVERNMENT

## 2020-10-20 PROCEDURE — 97140 MANUAL THERAPY 1/> REGIONS: CPT

## 2020-10-20 PROCEDURE — 97110 THERAPEUTIC EXERCISES: CPT

## 2020-10-27 ENCOUNTER — HOSPITAL ENCOUNTER (OUTPATIENT)
Dept: PHYSICAL THERAPY | Age: 40
Setting detail: THERAPIES SERIES
Discharge: HOME | End: 2020-10-27
Payer: OTHER GOVERNMENT

## 2020-10-27 PROCEDURE — 97012 MECHANICAL TRACTION THERAPY: CPT

## 2020-10-27 PROCEDURE — 97110 THERAPEUTIC EXERCISES: CPT

## 2020-10-27 PROCEDURE — 97140 MANUAL THERAPY 1/> REGIONS: CPT

## 2020-10-27 NOTE — FLOWSHEET NOTE
Outpatient Physical Therapy     [x] Daily Treatment Note   [] Progress Note   [] Discharge Note    Dr. Humble Higginbotham,  Mr. Sharonda Brand has been seen for 3 Visits of PT. Initial eval date 10/1/20. He has had one missed visit due to confusion with appt time. Pt is presenting with symptoms that are Red Flags for a potential HNP.   (+) radicular symptoms that extend into his L LE with prolonged lumbar flexion, (+) SLR test,  Relief with mechanical traction that rebounds to severe pain 8/10 with standing. Pt is working hard to tolerate therapy, but may benefit from an MRI to further assess these symptoms. Please review most recent tx note and consider ordering this test if you feel it is appropriate. We will continue per POC, to continue progress toward stated goals. Please see attached treatment note for most recent status. Thank you for your referral of this patient. Elana Singh PT, MPT, OMT-c [id number]            Date:  10/27/2020    Patient Name:  Alexander Dewitt         YOB: 1980    Medical Diagnosis/ ICD 10:   Chronic bilat low back pain M54.5 ; Injury of RIGHT shoulder, sequela S49.91xs  Treatment Diagnosis:       Onset Date:  Referral Date:  9/29/20     Referring Physician:   Charlie Neri DO ;  Juan Luis Gilman DO     Insurance/Certification Information/Allowed visits:  [facility];  Ref Sandro Bhatia # [phone] until 01/23/2021  - 16 visits or Units until 1/2021    Restrictions/Precautions:  Latex Allergy:   []? Yes   [x]? No       Health History reviewed with pt:                 [x]? Yes :  Back surgery, Shoulder surgery       Preferred Language for HealthCare:          [x]? English   []? Other:      Plan of care sent to provider:      [x]Faxed  []Co-signature    (attempts: 1[x] 2 []3[])         Plan of care signed:      []Yes date:            [x]No      Progress Note covers period from (if applicable):    [x]NA    []From          To           Next Progress Note due: 11/9/20    Visit# / total visits: 3/12     Plan for Next Session:      Reassess alignment.  Repeat manual tx    Mechanical txn trial #2   Progress HEP    Subjective:  Pt states that he is still in pain. Decided to take Cannabis for his pain. It is helping but the pain is still there. Says that he feels the therapy is making a difference because even though the pain is higher, his mobility is higher also. Cont to state that he is here to get better. Pain level: 5/10  AT EVAL:       Objective:        Exercises:    Exercises in bold performed in department today. Items not bolded are carried forward from prior visits for continuity of the record. Exercise/Equipment Resistance/Repetitions HEP Other comments       []    Bridge    3x8 [x]    GS     x5 increases pain [x]    Pelvic tilt  With TA x10 with max vc. [x]    Modified crunch    X 5 provokes symptoms into leg. [] Hold       []        []        []        []          []         []        []        []        []        []        []        []        []      Therapeutic Exercise/Home Exercise Program:   25 minutes  See above. Pt ed provided to role of fascia and flexibility in pain relief. Added Pelvic tilt today. Positional distraction for pain relief after mechanical txn, with CP applied    Special Test for HNP:   (+) SLR on L    Therapeutic Activity:  0 minutes     Gait: 0 minutes    Neuromuscular Re-Education:  0 minutes      Canalith Repositioning Procedure:  0 minutes`    Manual Therapy:  30 minutes  Left posterior rotation this date. MET to reduce pelvic rotation with sacral component   Supine lumbo pelvic roll to LEFT side - felt good  IASTM to thoracolumbar fascia in quadruped, prone using instrument HG 2, 8. Manual deep release and scar massage with cavitation noted. Milly pose thoracolumbar stretch with manual MFR. Stretch feels good to low back, however produces increased symptoms into L LE upon return to supine.    Manual lumbar distraction , without rocking, decreased pain complaints. Pain returns upon completion of tx    Modalities: 20 minutes  Mechanical txn in supine. 42 kg max pull, 17 min pull. States that this modality \"felt great\" until he sat up. Then it felt like his back was locking up. Functional Outcome Measure:    NA at time of eval        Assessment/Treatment/Activity Tolerance:  Pain 6/10 at end of tx.    Patients response to treatment:   [x]Patient able to complete treatment  []Patient limited by fatigue   []Patient limited by pain   []Patient limited by other medical complications   []Other:     GOALS    Short Term Goals:  3  weeks MET Not Met Long Term Goals: 6  weeks MET Not Met   Establish HEP []?  []?  Pt independent with HEP []?  []?    Pain 5 /10 or less []?  []?  Pain  3/10 or less []?  []?    Increase identified strength deficits 1/3 grade  []?  []?  Increase strength to 4+/5 or greater  []?  []?    Achieve neutral alignment of pelvis / Resolve SI dysfunction []?  []?  Maintain neutral alignment / Normal SI mobility x 2 consecutive visits []?  []?    Tolerate ADLs without increased pain []?  []?  Return to all ADLs without limitation []?  []?    Increased centralization of symptoms []?  []?  Denies further symptoms into LE []?  []?             Prognosis: [x]Good   []Fair   []Poor    Patient Requires Follow-up:  [x]Yes  []No    Plan: []Plan of care initiated     [x]Continue per plan of care    [] Alter current plan (see comments)    []Hold pending MD visit []Discharge    Timed Code Treatment Minutes:  75  Total Treatment Minutes:  75         Electronically signed by:  Therese Martinez, PT, MPT, OPMT-c [id number]

## 2020-11-03 ENCOUNTER — HOSPITAL ENCOUNTER (OUTPATIENT)
Dept: PHYSICAL THERAPY | Age: 40
Setting detail: THERAPIES SERIES
Discharge: HOME | End: 2020-11-03
Payer: OTHER GOVERNMENT

## 2020-11-03 PROCEDURE — 97110 THERAPEUTIC EXERCISES: CPT

## 2020-11-03 PROCEDURE — 97140 MANUAL THERAPY 1/> REGIONS: CPT

## 2020-11-03 PROCEDURE — 97012 MECHANICAL TRACTION THERAPY: CPT

## 2020-11-03 NOTE — FLOWSHEET NOTE
Outpatient Physical Therapy     [x] Daily Treatment Note   [] Progress Note   [] Discharge Note          Date:  11/3/2020    Patient Name:  Bobbi Allred         YOB: 1980    Medical Diagnosis/ ICD 10:   Chronic bilat low back pain M54.5 ; Injury of RIGHT shoulder, sequela S49.91xs  Treatment Diagnosis:       Onset Date:  Referral Date:  9/29/20     Referring Physician:   Niecy Laboy DO ;  Sandra Thomas DO     Insurance/Certification Information/Allowed visits:  [facility];  Ref Marquita [facility] # [phone] until 01/23/2021  - 16 visits or Units until 1/2021    Restrictions/Precautions:  Latex Allergy:   []? Yes   [x]? No       Health History reviewed with pt:                 [x]? Yes :  Back surgery 2010 , Shoulder surgery        Preferred Language for HealthCare:          [x]? English   []? Other:      Plan of care sent to provider:      [x]Faxed  []Co-signature    (attempts: 1[x] 2 []3[])         Plan of care signed:      []Yes date:            [x]No      Progress Note covers period from (if applicable):    [x]NA    []From          To           Next Progress Note due:   11/9/20    Visit# / total visits: 4 /12     Plan for Next Session:      Reassess alignment.  Repeat manual tx    Mechanical txn trial #3   Progress HEP    Subjective:  Pt states that he is still in pain. Feels that the therapy is helping a little bit with his mobility but not with the pain. Reports radicular symptoms in the L LE always to calf,  Down to buttock on Right. The pain was definitely worse when he left here last visit but it did get better later in the day and he did get more flexibility out of it. Still using cannabis for pain. Lowest pain rating over the weekend was a 4.   Did not pursue calling his MD for the MRI because he wants to try to achieve 5-6 Traction treatments to see if it will help first.   Has submitted an application for financial assistance and will be willing to increase frequency to 2x/week after tx. No c/o pain with return to sit/ stand. States that he does feel sore. Rates pain 6/10. Functional Outcome Measure:    NA at time of eval        Assessment/Treatment/Activity Tolerance:  States that he feels better than when he arrived. Rates Pain 6/10.    Patients response to treatment:   [x]Patient able to complete treatment  []Patient limited by fatigue   []Patient limited by pain   []Patient limited by other medical complications   []Other:     GOALS    Short Term Goals:  3  weeks MET Not Met Long Term Goals: 6  weeks MET Not Met   Establish HEP []?  []?  Pt independent with HEP []?  []?    Pain 5 /10 or less []?  []?  Pain  3/10 or less []?  []?    Increase identified strength deficits 1/3 grade  []?  []?  Increase strength to 4+/5 or greater  []?  []?    Achieve neutral alignment of pelvis / Resolve SI dysfunction []?  []?  Maintain neutral alignment / Normal SI mobility x 2 consecutive visits []?  []?    Tolerate ADLs without increased pain []?  []?  Return to all ADLs without limitation []?  []?    Increased centralization of symptoms []?  []?  Denies further symptoms into LE []?  []?             Prognosis: [x]Good   []Fair   []Poor    Patient Requires Follow-up:  [x]Yes  []No    Plan: []Plan of care initiated     [x]Continue per plan of care    [] Alter current plan (see comments)    []Hold pending MD visit []Discharge    Timed Code Treatment Minutes:  65  Total Treatment Minutes:  65         Electronically signed by:  Diane Lamb, PT, MPT, OMT-c [id number]

## 2020-11-10 ENCOUNTER — HOSPITAL ENCOUNTER (OUTPATIENT)
Dept: PHYSICAL THERAPY | Age: 40
Setting detail: THERAPIES SERIES
Discharge: HOME | End: 2020-11-10
Payer: OTHER GOVERNMENT

## 2020-11-10 PROCEDURE — 97012 MECHANICAL TRACTION THERAPY: CPT

## 2020-11-10 PROCEDURE — 97140 MANUAL THERAPY 1/> REGIONS: CPT

## 2020-11-10 PROCEDURE — 97110 THERAPEUTIC EXERCISES: CPT

## 2020-11-10 NOTE — FLOWSHEET NOTE
 Repeat manual tx    Mechanical txn trial #4   Progress HEP    Subjective:  Pt states that he is about the same. No real change. Did receive an order for an MRI today but it is not scheduled yet. He is doing his exercises and they are fine. Denies adverse reaction to the traction last visit. Agreeable to try traction again. Pain level: 5/10    AT EVAL:       Objective:      Reassess for PN  PAIN:  Pt reports no change in symptoms, no change in pain. Rates pain 5/10. Alignment: Neutral today   Fascial Tesion: Marked improvement with no production with erythema  Strength:  Improved tolerance to HEP today. Good effort demonstrated with MET. Hip Flexion  (L1-2) 5 5   Hip Extension       Hip Abduction  (L5) 5 5   Hip Adduction  (L3) 3+ 3+   Hip IR       Hip ER       Knee Flexion  (L5,S1) 4- 4-   Knee Extension   (L3,4) 4 4   Ankle Dorsiflex  (L4) 4+ 4+     ADLS:  Reports spending time with his sons this weekend. Nothing specific. No improvement in activities noted , due to pain that remains constant. Exercises:    Exercises in bold performed in department today. Items not bolded are carried forward from prior visits for continuity of the record. Exercise/Equipment Resistance/Repetitions HEP Other comments       []    Bridge    3x10 [x] Minimal elevation achieved   GS     3x10  [x]    TA 2x10  [x]    TA with cervical elevation 2x10 X 5 sec hold added today   TA with Marches 2x10 X Max cues for form   Modified crunch  X 5 provokes symptoms into leg. [] Hold. []    Prone Leg raise with knee bent   2x10 bilat [x] No c/o pain    Prone Leg raise with knee straight    2x5 bilat [] Nice form today.   Pt does report pain to low back   Prone UE raise   2x10 bilat [] Difficult, painful into L lower back   Prone Cervical raise , arms at side, adding scap squeeze  2 x10 [x]         []        []        []        []        []        []        []        []      Therapeutic Exercise/Home Exercise Program:   30 minutes  Reassess For PN  Review of HEP . Therapeutic Activity:  0 minutes     Gait: 0 minutes    Neuromuscular Re-Education:  0 minutes      Canalith Repositioning Procedure:  0 minutes`    Manual Therapy:  15 minutes  Neutral alignment this date. MET for stabilization. Good resistance noted today   RMM MFR to Thoraco lumbar fascia. No erythema noted today with good tissue motility. No c/o pain during tx. IASTM to thoracolumbar fascia in  prone over pillows using instrument HG   8. MFR cross hand release to thoroacolumbar fascia/ scar release  Manual deep release and scar massage with cavitation noted. Deferred 2/2 complaints of increased pain and neuro symptoms after this activity last visit. Modalities: 20 minutes  Mechanical txn trial #3 in supine. 90lbs Max. 50 lbs min     No c/o pain during course of tx. At end of tx, states that he is sore. No c/o pain with return to sit/ stand. States that he does feel sore. Rates pain 8/10. Functional Outcome Measure:    NA at time of eval        Assessment/Treatment/Activity Tolerance:  States that he is still very sore. Reports the pain to be 8/10 at end of session. Patients response to treatment:   [x]Patient able to complete treatment  []Patient limited by fatigue   []Patient limited by pain   []Patient limited by other medical complications   []Other:     GOALS    Short Term Goals:  3  weeks MET Not Met Long Term Goals: 6  weeks MET Not Met   Establish HEP [x]?  []?  Pt independent with HEP []?  [x]?    Pain 5 /10 or less [x]?  []?  Pain  3/10 or less []?  [x]?     Increase identified strength deficits 1/3 grade  []?  []?  Increase strength to 4+/5 or greater  []?  [x]?     Achieve neutral alignment of pelvis / Resolve SI dysfunction [x]?  []?  Maintain neutral alignment / Normal SI mobility x 2 consecutive visits [x]?  []?    Tolerate ADLs without increased pain []?  [x]?   Return to all ADLs without limitation []?  [x]?     Increased centralization of symptoms [x]?  []?  Denies further symptoms into LE []?  [x]?              Prognosis: [x]Good   []Fair   []Poor    Patient Requires Follow-up:  [x]Yes  []No    Plan: []Plan of care initiated     [x]Continue per plan of care    [] Alter current plan (see comments)    []Hold pending MD visit []Discharge    Timed Code Treatment Minutes:  65  Total Treatment Minutes:  65         Electronically signed by:  Cathy Nj, PT, MPT, OMT-c [id number]

## 2020-11-17 ENCOUNTER — HOSPITAL ENCOUNTER (OUTPATIENT)
Dept: PHYSICAL THERAPY | Age: 40
Setting detail: THERAPIES SERIES
Discharge: HOME | End: 2020-11-17
Payer: OTHER GOVERNMENT

## 2020-11-17 PROCEDURE — 97110 THERAPEUTIC EXERCISES: CPT

## 2020-11-17 PROCEDURE — 97530 THERAPEUTIC ACTIVITIES: CPT

## 2020-11-17 PROCEDURE — 97140 MANUAL THERAPY 1/> REGIONS: CPT

## 2020-11-17 NOTE — FLOWSHEET NOTE
Outpatient Physical Therapy     [x] Daily Treatment Note   [] Progress Note   [] Discharge Note      Date:  11/17/2020    Patient Name:  Kevin Nelson         YOB: 1980    Medical Diagnosis/ ICD 10:   Chronic bilat low back pain M54.5 ; Injury of RIGHT shoulder, sequela S49.91xs  Treatment Diagnosis:       Onset Date:  Referral Date:  9/29/20     Referring Physician:   Cindy Doe DO ;  Meka Jones DO     Insurance/Certification Information/Allowed visits:  [facility];  Ref [facility] # [id number] until 01/23/2021  - 16 visits or Units until 1/2021    Restrictions/Precautions:  Latex Allergy:   []? Yes   [x]? No       Health History reviewed with pt:                 [x]? Yes :  Back surgery 2010 , Shoulder surgery        Preferred Language for HealthCare:          [x]? English   []? Other:      Plan of care sent to provider:      [x]Faxed  []Co-signature    (attempts: 1[x] 2 []3[])         Plan of care signed:      []Yes date:            [x]No      Progress Note covers period from (if applicable):    [x]NA    []From          To           Next Progress Note due:   12/9/20    Visit# / total visits: 6/12     Plan for Next Session:      Reassess alignment.  Repeat manual tx    Mechanical txn trial #5   Progress HEP    Subjective:  Pt states that he is about the same. MRI is tomorrow. Getting more flexible but not much. 20% improved. Having a difficult week due to his grandfather being gravely ill. Has not been doing his exercises. Pain level: 5/10    AT EVAL:       Objective:        Exercises:    Exercises in bold performed in department today. Items not bolded are carried forward from prior visits for continuity of the record.   Exercise/Equipment Resistance/Repetitions HEP Other comments       []    Bridge    3x10 [x] Minimal elevation achieved   GS     3x10  [x]    TA 2x10  [x]    TA with cervical elevation 2x10 X 5 sec hold added today   TA with Marches 2x10 X Max cues for form   Modified crunch  X 5 provokes symptoms into leg. [] Hold. []    Prone Leg raise with knee bent   2x10 bilat [x] No c/o pain    Prone Leg raise with knee straight    2x10 bilat [] Nice form today. Pt does not  report pain to low back   Quadruped LE extension 2x10     Quadruped UE/LE raise x5  Difficulty balancing . No pain c/o   Prone UE raise   2x10 bilat [] Difficult, painful into L lower back   Prone Cervical raise , arms at side, adding scap squeeze  2 x10 [x]         []        []        []        []        []        []        []        []      Therapeutic Exercise/Home Exercise Program:   15 minutes  Reassess For PN  Progression of HEP . Therapeutic Activity:  0 minutes     Gait: 0 minutes    Neuromuscular Re-Education:  0 minutes      Canalith Repositioning Procedure:  0 minutes`    Manual Therapy:  15 minutes  Neutral alignment this date. MET for stabilization. Intermittent / breakaway resistance noted. Manual MFR to [place] lumbar fascia. Cavitation noted. No c/o pain during tx. States he thinks this is helping and he feels better after. IASTM to thoracolumbar fascia in  prone over pillows using instrument HG   8. MFR cross hand release to thoroacolumbar fascia/ scar release  Manual deep release and scar massage with cavitation noted. Deferred 2/2 complaints of increased pain and neuro symptoms after this activity last visit. Modalities: 20 minutes  Mechanical txn trial #4 in supine. 90lbs Max. 50 lbs min     No c/o pain during course of tx. At end of tx, states that he feels ok but is afraid to sit up. No c/o pain with return to sit/ stand. States that he does feel sore as he is walking out of the clinic. Functional Outcome Measure:    NA at time of eval        Assessment/Treatment/Activity Tolerance:  States that he feels a little better but is still sore. 4/10. MRI is scheduled for tomorrow.   Patients response to treatment:   [x]Patient able to complete treatment  []Patient limited by fatigue   []Patient limited by pain   []Patient limited by other medical complications   []Other:     GOALS    Short Term Goals:  3  weeks MET Not Met Long Term Goals: 6  weeks MET Not Met   Establish HEP [x]?  []?  Pt independent with HEP []?  [x]?    Pain 5 /10 or less [x]?  []?  Pain  3/10 or less []?  [x]?     Increase identified strength deficits 1/3 grade  []?  []?  Increase strength to 4+/5 or greater  []?  [x]?     Achieve neutral alignment of pelvis / Resolve SI dysfunction [x]?  []?  Maintain neutral alignment / Normal SI mobility x 2 consecutive visits [x]?  []?    Tolerate ADLs without increased pain []?  [x]?   Return to all ADLs without limitation []?  [x]?     Increased centralization of symptoms [x]?  []?  Denies further symptoms into LE []?  [x]?              Prognosis: [x]Good   []Fair   []Poor    Patient Requires Follow-up:  [x]Yes  []No    Plan: []Plan of care initiated     [x]Continue per plan of care    [] Alter current plan (see comments)    []Hold pending MD visit []Discharge    Timed Code Treatment Minutes:  50  Total Treatment Minutes:  50         Electronically signed by:  David Hernandez, PT, MPT, OMT-c [id number]

## 2020-11-18 ENCOUNTER — HOSPITAL ENCOUNTER (OUTPATIENT)
Dept: MRI IMAGING | Age: 40
Discharge: HOME | End: 2020-11-18
Payer: OTHER GOVERNMENT

## 2020-11-18 PROCEDURE — 72148 MRI LUMBAR SPINE W/O DYE: CPT

## 2020-11-24 ENCOUNTER — HOSPITAL ENCOUNTER (OUTPATIENT)
Dept: PSYCHIATRY | Age: 40
Setting detail: THERAPIES SERIES
Discharge: HOME | End: 2020-11-24
Payer: OTHER GOVERNMENT

## 2020-11-24 VITALS
HEART RATE: 67 BPM | TEMPERATURE: 97.3 F | RESPIRATION RATE: 18 BRPM | SYSTOLIC BLOOD PRESSURE: 133 MMHG | DIASTOLIC BLOOD PRESSURE: 79 MMHG

## 2020-11-24 PROCEDURE — 99215 OFFICE O/P EST HI 40 MIN: CPT | Performed by: PSYCHIATRY & NEUROLOGY

## 2020-11-24 RX ORDER — LAMOTRIGINE 25 MG/1
50 TABLET ORAL EVERY EVENING
Qty: 60 TABLET | Refills: 0 | Status: SHIPPED | OUTPATIENT
Start: 2020-11-24 | End: 2020-12-24

## 2020-11-24 NOTE — PROGRESS NOTES
Department of Psychiatry  Progress Note    Patient's chart was reviewed. Met with patient. SUBJECTIVE:    Continues in the [place] trauma program.  Has found it helpful. Reports doing well overall but continues with some symptoms of depression - worse at times. Has not had SI. Went for 30 days without smoking cannabis but has since resumed at 1x/daily. Has not taken any PRN quetiapine since last visit. ROS:   Patient has new complaints: no  Sleeping adequately:  Yes   Appetite adequate: Yes    Does not endorse or describe head aches, SOB, cough, sore throat, chills, chest pain, abdominal pain, neuro problems, bleeding problems, or skin problems. Was moving all four extremities and speaking without difficulty. OBJECTIVE:  Vitals:    11/24/20 1115   BP: 133/79   Pulse: 67   Resp: 18   Temp: 97.3 °F (36.3 °C)     Gait: normal    Mental Status Examination:    Appearance: good grooming and hygiene  Behavior/Attitude toward examiner:  pleasant; good eye contact  Speech: Normal rate, volume, amount  Mood:  \"ok\"  Affect:  mood congruent    Thought processes:  Goal directed, linear, no YOON or gross disorganization  Thought Content: no SI, no HI, no delusions voiced, no obsessions  Perceptions: no AVH  Attention: attention span and concentration were intact to interview   Abstraction: intact  Cognition:  Alert and oriented to person, place, time, and situation, recall intact  Insight: intact   Judgment: intact     Medication:  Lamictal 200mg QHS  Quetiapine 100mg QHS PRN    FORMULATIONS:  This is a domiciled, , unemployed, 61-year-old  with bipolar 1 disorder and cannabis use disorder, who was referred to  our program after an inpatient admission for bipolar roman. The patient  met criteria for bipolar 1 disorder, most recently manic with  psychotic features. He completed our IOP program and presents for a bridge appt. DIAGNOSES:  1. Bipolar 1 disorder  2.   Cannabis use disorder, in short-term remission     PLAN:  1. Bridge appointment. 2.  On admission, continued lithium 450 mg twice daily and Seroquel 200 mg p.o. q.h.s.. Goal is monotherapy with lithium once therapeutic. Discussed this at length with the patient. 7/9/2020 - Lithium level 0.5 - increased Lithium to 600mg BID. TSH elevated. 7/15/2020 -. Lithium level 0.8. However, his TSH continued to increase and was 15.36, up from 8.63 on 7/8. His free T4 on 7/15 was normal at 1.2    7/16/2020 - discontinued lithium, increased quetiapine in the short-term, and started Lamictal 25mg daily. Goal is monotherapy with Lamictal.     7/23/2020 - increased Lamictal to 25mg BID. Continued quetiapine 200mg QHS    7/30/2020 - Lamictal increased to 50mg BID two days ago. Reduced Quetiapine to 100mg QHS. 8/13/2020 - Lamictal increased to 75mg BID last week. Increased to 100mg BID today. Continued Quetiapine 100mg QHS for now. Consider discontinuing Quetiapine at next visit. 8/28/2020 - consolidate Lamictal 200mg QHS.      9/3/2020 - euthymic. Change Quetiapine from scheduled to PRN. Goal is monotherapy with Lamictal.     9/24/2020 - euthymic.    11/24/2020 - increase Lamictal to 250mg daily. Target dose 300mg. 3.  Safety plan - call 911 if he should develop thoughts of self-harm. 4.  Follow up with me in two weeks weeks.  Sooner prn.      Luisa Bruce MD, [address], [place]

## 2021-06-07 ENCOUNTER — HOSPITAL ENCOUNTER (OUTPATIENT)
Dept: PHYSICAL THERAPY | Age: 41
Setting detail: THERAPIES SERIES
Discharge: HOME | End: 2021-06-07
Payer: OTHER GOVERNMENT

## 2021-06-07 PROCEDURE — 97162 PT EVAL MOD COMPLEX 30 MIN: CPT

## 2021-06-07 PROCEDURE — 97110 THERAPEUTIC EXERCISES: CPT

## 2021-06-07 NOTE — PROGRESS NOTES
[facility]  [address]     [place], [facility]  Phone: [phone]       Fax:   [phone]       Physical Therapy Certification    Dear  Referring Practitioner: Verner Bond, DO,    We had the pleasure of evaluating the following patient for physical therapy services at [address] [facility]. A summary of our findings can be found in the initial assessment below. This includes our plan of care. If you have any questions or concerns regarding these findings, please do not hesitate to contact me at the office phone number checked above. Thank you for the referral.       Physician Signature:_______________________________Date:__________________  By signing above (or electronic signature), therapist's plan is approved by physician      Patient: Kameron Lynn   : 1980   MRN: [id number]    Referring Physician: Referring Practitioner: Verner Bond, DO        Evaluation Date: 2021        Medical Diagnosis Information:  Diagnosis: Acute pain of left shoulder (M25.512), chronic bilateral low back pain with bilateral sciatica (M54.42)   Treatment Diagnosis: low back pain with sciatica, L shoulder pain with mobility deficits                                           Insurance information: PT Insurance Information: , pre-auth required, $21 co-pay     Precautions/ Contra-indications:   Latex Allergy:  [x]NO      []YES     Preferred Language for Healthcare:   [x]English       []other:    C-SSRS Triggered by Intake questionnaire (Past 2 wk assessment):   [x] No, Questionnaire did not trigger screening.   [] Yes, Patient intake triggered further evaluation      [] C-SSRS Screening completed  [] PCP notified via Plan of Care  [] Emergency services notified        SUBJECTIVE FINDINGS      History of Present Illness:        Tell me what brings you here?   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 Pain began in January of this year. · Hard time getting his socks and shoes on in the morning because of the back pain  What's your interpretation or what sense do you make of your situation - What do you think is going on, why do you think you're having this issue? · Back pain has been going on for a while  · \"I think I tore something in my L shoulder. I'm hoping that's not true\"  What do you think needs to happen for you get past your current situation? · \"I honestly don't know. I'm hoping that this will help or at least I'll know that I tried. \"  What do you instinctively do to alleviate your symptoms? · Rest, stretching but stretching is hard  On a scale of 0-10, how concerned are you about your situation? \"I'd rate it about 7-8/10 because I know that I need to take care of my body\"  Wants to figure out what's going on with his shoulder so he doesn't have to get surgery  If this problem didn't exist any more, what would you be doing? · \"Chilling at home\"  · \"I'd be able to exercise a lot more, take care of myself\" - lifting weights, running  What do you think will be the biggest obstacle to you making progress?   · \"Pain\"    Pain       Patient reports pain is  5/10 pain at present and  9/10 pain at its worst.  Pain increases with : twisting, bending, lifting, prolonged sitting, prolonged standing      Decreases with: massage (temporary relief)    Pt. reports pain with coughing, sneezing and laughing:  []Yes [x]No        Current Functional Limitations:   [x]Yes   []No  Functional Complaints: Sleeping, self care, sitting, standing, position changes, driving, walking, squatting, climbing stairs all affected by pain. PLOF:   [x]No functional limitations   []Pre-exisiting limitations:  Pt's sleep is affected? [x]Yes   []No     Trouble getting to sleep, has to sleep with something between his legs, tossing and turning increases pain  Relevant Medical History:  No past medical history on file. Past Surgical History:   Procedure Laterality Date    BACK SURGERY      laminectomy   2011 - in lower lumbar region. Says that surgery helped but did not complete eliminate the pain. From lumbar spine MRI on 11/18/20:  FINDINGS:   BONES/ALIGNMENT: Minimal grade 1 retrolisthesis of L5 on S1 measures 3 mm. Lumbar vertebral bodies are normal in height.  No acute lumbar spine   fracture.  Degenerative marrow changes are seen at L5-S1.  Remaining marrow   signal pattern is within normal limits.       SPINAL CORD: The conus terminates normally.       SOFT TISSUES: No paraspinal mass identified.       L1-L2: There is no significant disc herniation, spinal canal stenosis or   neural foraminal narrowing.       L2-L3: There is no significant disc herniation, spinal canal stenosis or   neural foraminal narrowing.       L3-L4: Mild DDD with disc desiccation.  No disc herniation or spinal canal   stenosis.  No significant neural foraminal stenosis.       L4-L5: Mild DDD with disc height loss and desiccation.  Posterior annular   fissure.  No focal disc herniation or significant spinal canal stenosis.  No   significant neural foraminal stenosis.       L5-S1: Moderate DDD.  Previous left hemilaminectomy.  Disc bulge measures 2.5   mm.  No significant spinal canal stenosis.  Bilateral facet joint DJD. Moderate bilateral neural foraminal stenosis.        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  []Cervical Spine   []Thoracic Spine     ROM Deficits Identified             *Denotes pain AROM              % Decresased PROM              % Decreased MMT/Resisted Tests   Flexion      Extension      Sidebending Left      Sidebending Right      Rotation Left      Rotation Right        Lumbar flexion - most painful, 50% WNL  Lumbar extension - moderate pain, 75% WNL  Sidebending right - mild pain, 100% WNL    *Pain all directions except extension in L shoulder  UE Range of Motion/Strength Testing      [x]All ROM WFL except as marked below   [x]All strength WFL (5/5) except as marked below    [x]All myotomes WFL (5/5) except as marked below      Range Tested MMT / Resisted PROM AROM Comments   *denotes pain Left Right Left Right Left Right    Cervical Flexion C1-2          Cervical Sidebend  C3          Shoulder Shrug  C4          Shoulder Flexion  4        Shoulder Extension          Shoulder Abduction  C5  4        Shoulder Adduction           Shoulder IR  4+        Shoulder ER  4        Elbow Flexion  C6          Elbow Extension C7          Pronation          Supination          Wrist Flexion C7          Wrist Extension C6          Radial Deviation          Ulnar Deviation                     Thumb Ext C8          Intrinsics T1            Hip abduction - 4+/5 on R, 4/5 on L  Hip pain secondary to relatively recent L shoulder injury. Assessment reveals deficits in strength, nerve mobility ROM, and increased pain. Pt will benefit from cont PT to address these deficits and promote return to highest level of functional independence. Functional Impairments:     []Noted spinal or UE joint hypomobility   []Noted spinal or UE joint hypermobility   [x]Decreased spinal/UE functional ROM   []Abnormal reflexes/sensation/myotomal/dermatomal deficits   [x]Decreased RC/scapular/core strength and neuromuscular control    []Decreased UE functional strength   []other:       Functional Activity Limitations (from functional questionnaire and intake)   [x]Reduced ability to tolerate prolonged functional positions   []Reduced ability or difficulty with changes of positions or transfers between positions   []Reduced ability to maintain good posture and demonstrate good body mechanics with sitting, bending, and lifting   [x] Reduced ability or tolerance with driving and/or computer work   [x]Reduced ability to perform lifting, reaching, carrying tasks   [x]Reduced ability to reach behind back   [x]Reduced ability to sleep    [x]Reduced ability to tolerate any impact through UE or spine   []Reduced ability to  or hold objects   []Reduced ability to throw or toss an object   []other:     Participation Restrictions   [x]Reduced participation in self care activities   [x]Reduced participation in home management activities   []Reduced participation in work activities   [x]Reduced participation in social activities. []Reduced participation in sport/recreational activities. Classification :    []signs/symptoms consistent with post-surgical status including decreased ROM, strength and function.     [x]signs/symptoms consistent with joint sprain/strain    []signs/symptoms consistent with shoulder impingement (internal, external, primary or secondary)   []signs/symptoms consistent with shoulder/elbow/wrist tendinopathy   []Signs/symptoms consistent with Rotator cuff tear   []sign/symptoms consistent with labral tear   []signs/symptoms consistent with rib dysfunction   []signs/symptoms consistent with postural dysfunction   []signs/symptoms consistent with Glenohumeral IR Deficit - <45 degrees   []signs/symptoms consistent with facet dysfunction of cervical/thoracic spine   [x]signs/symptoms consistent with pathology which may benefit from Dry Needling   []signs/symptoms which may limit the use of advanced manual therapy techniques: (Elevated CV risk profile, recent trauma, intolerance to end range positions, prior TIA, visual issues, UE neurological compromise )    Prognosis/Rehab Potential:      []Excellent   [x]Good    []Fair   []Poor    Tolerance of evaluation/treatment:    []Excellent   [x]Good    []Fair   []Poor     Physical Therapy Evaluation Complexity Justification  [x] A history of present problem with:  [] no personal factors and/or comorbidities that impact the plan of care;  [x]1-2 personal factors and/or comorbidities that impact the plan of care  []3 personal factors and/or comorbidities that impact the plan of care  [x] An examination of body systems using standardized tests and measures addressing any of the following: body structures and functions (impairments), activity limitations, and/or participation restrictions;:  [] a total of 1-2 or more elements   [x] a total of 3 or more elements   [] a total of 4 or more elements   [x] A clinical presentation with:  [] stable and/or uncomplicated characteristics   [x] evolving clinical presentation with changing characteristics  [] unstable and unpredictable characteristics;   [x] Clinical decision making of [] low, [x] moderate, [] high complexity using standardized patient assessment instrument and/or measurable assessment of functional outcome.     [] EVAL (LOW) 80371 (typically 20 minutes face-to-face)  [x] EVAL (MOD) 21355 (typically 30 minutes face-to-face)  [] EVAL (HIGH) 56099 (typically 45 minutes face-to-face)  [] RE-EVAL      PLAN:  Frequency/Duration:  1-2 days per week for 8 Weeks:    Interventions:  [x]  Therapeutic exercise including: strength training, ROM, for scapula, core and Upper extremity, including postural re-education. [x]  NMR activation and proprioception for UE, periscapular and RC muscles and Core, including postural re-education. [x]  Manual therapy as indicated for shoulder, scapula, spine and associated soft tissue including: Dry Needling/IASTM, STM, PROM, Gr I-IV mobilizations, manipulation. [x] Modalities as needed that may include: thermal agents, E-stim, Biofeedback, US, iontophoresis as indicated  [x] Patient education on joint protection, postural re-education, activity modification, progression of HEP. HEP instruction: Pt provided HEP via [employer]   Sidelying sciatic nerve glide   Supine sciatic nerve glide      GOALS:  Patient stated goal:  \"I would just like to be able to have less pain and more movement\"  [] Progressing: [] Met: [] Not Met: [] Adjusted    Therapist goals for Patient:   Short Term Goals: To be achieved in: 4 weeks  1. Independent in HEP and progression per patient tolerance, in order to prevent re-injury. [] Progressing: [] Met: [] Not Met: [] Adjusted  2. Patient's will have a decrease in pain to 4/10 on average to facilitate improvement in movement, function, and ADLs as indicated by Functional Deficits. [] Progressing: [] Met: [] Not Met: [] Adjusted    Long Term Goals: To be achieved in: 8 weeks  1. Disability index score of 40% or less for the GISSELLE to assist with improving functional mobility and capacity. [] Progressing: [] Met: [] Not Met: [] Adjusted  2. Patient will demonstrate increased lumbar AROM to 100% WNL with no LE symptoms. [] Progressing: [] Met: [] Not Met: [] Adjusted  3.  Patient will demonstrate an increase in NM recruitment/activation and overall GH and scapular strength to within n5lbs HHD or WNL for proper functional mobility as indicated by patients Functional Deficits. [] Progressing: [] Met: [] Not Met: [] Adjusted  4. Patient will complete a 20 minute strength training workout with minimal to no low back pain.    [] Progressing: [] Met: [] Not Met: [] Adjusted    Electronically signed by:  Romana Regulus [address] [place] [address], [facility] [address], [address] [facility] #[id number]

## 2021-06-07 NOTE — FLOWSHEET NOTE
Physical Therapy Daily Treatment Note  Date:  2021    Patient Name:  Jacqueline Fuchs    :  1980  MRN: [id number]      Medical/Treatment Diagnosis Information:  · Diagnosis: Acute pain of left shoulder (M25.512), chronic bilateral low back pain with bilateral sciatica (M54.42)  · Treatment Diagnosis: low back pain with sciatica, L shoulder pain with mobility deficits    Insurance/Certification information:  PT Insurance Information: , pre-auth required, $21 co-pay    Physician Information:  Referring Practitioner: Meryle Levo, DO    Plan of care signed :  []  Yes  [] No  []  Cosign []  Fax    Date of Patient follow up with Physician:     Is this a Progress Report:     []  Yes  [x]  No      If Yes:  Date Range for reporting period:  Beginning 2021  Ending    Progress report will be due (10 Rx or 30 days whichever is less): 16      Recertification will be due (POC Duration  / 90 days whichever is less): 12 visits      Visit # Insurance Allowable Auth Required     Waiting for pre-auth [x]  Yes []  No        Functional Scale: GISSELLE      Date 21  Score: 27/50 = 54% disability    Functional Scale: SPADI      Date 21  Score: 57/130 = 44% disability      Latex Allergy:  [x]NO      []YES  Preferred Language for Healthcare:   [x]English       []other:    RESTRICTIONS/PRECAUTIONS: previous hx of laminectomy     SUBJECTIVE:  See eval    Pain level: 5/10      Plan Moving Forward/ For next visit:    Centralize LE symptoms    L shoulder strengthening and scapular stability   Investigate directional preference for lower back symptoms      OBJECTIVE: See eval        Exercises/Interventions:     Exercises in bold performed in department today. Items not bolded are carried forward from prior visits for continuity of the record.   Exercise/Equipment Resistance/Repetitions HEP Other comments   Sidelying sciatic nerve glide in R sidelying   2 min [x]    Supine sciatic nerve glide, R LE 2 min [x]        []        []        []        []        []        []        []          []         []        []        []        []        []        []        []        []      Therapeutic Exercise/Home Exercise Program:   15 minutes  Pt inst in role of PT, prognosis, plan of care, use of CP/HP, activity modification, and benefits of therapy. HEP has been established, See above, pt given handout(s) of new exercises. Current HEP:  Sidelying sciatic nerve glide in R sidelying  Supine sciatic nerve glide, R LE    Therapeutic Activity:  0 minutes     Gait: 0 minutes    Neuromuscular Re-Education:  0 minutes      Canalith Repositioning Procedure:      Manual Therapy:  0 minutes    Modalities: 0 minutes            ASSESSMENT:  Pt is  47 Y. O  male, presenting with c/o chronic low back pain with sciatica and L shoulder pain secondary to relatively recent L shoulder injury. Assessment reveals deficits in strength, nerve mobility ROM, and increased pain. Pt will benefit from cont PT to address these deficits and promote return to highest level of functional independence. Goals:   Patient stated goal:  \"I would just like to be able to have less pain and more movement\"  []? Progressing: []? Met: []? Not Met: []? Adjusted     Therapist goals for Patient:   Short Term Goals: To be achieved in: 4 weeks  1. Independent in HEP and progression per patient tolerance, in order to prevent re-injury. []? Progressing: []? Met: []? Not Met: []? Adjusted  2. Patient's will have a decrease in pain to 4/10 on average to facilitate improvement in movement, function, and ADLs as indicated by Functional Deficits. []? Progressing: []? Met: []? Not Met: []? Adjusted     Long Term Goals: To be achieved in: 8 weeks  1. Disability index score of 40% or less for the GISSELLE to assist with improving functional mobility and capacity. []? Progressing: []? Met: []? Not Met: []? Adjusted  2.  Patient will demonstrate increased lumbar AROM to 100% WNL with no LE symptoms. []? Progressing: []? Met: []? Not Met: []? Adjusted  3. Patient will demonstrate an increase in NM recruitment/activation and overall GH and scapular strength to within n5lbs HHD or WNL for proper functional mobility as indicated by patients Functional Deficits. []? Progressing: []? Met: []? Not Met: []? Adjusted  4. Patient will complete a 20 minute strength training workout with minimal to no low back pain. []? Progressing: []? Met: []? Not Met: []? Adjusted    Overall Progression Towards Functional goals/ Treatment Progress Update:  [] Patient is progressing as expected towards functional goals listed. [] Progression is slowed due to complexities/Impairments listed. [] Progression has been slowed due to co-morbidities.   [x] Plan just implemented, too soon to assess goals progression <30days   [] Goals require adjustment due to lack of progress  [] Patient is not progressing as expected and requires additional follow up with physician  [] Other    Prognosis for POC: [x] Good [] Fair  [] Poor    Patient requires continued skilled intervention: [x] Yes  [] No    Treatment/Activity Tolerance:  [x] Patient able to complete treatment  [] Patient limited by fatigue  [] Patient limited by pain    [] Patient limited by other medical complications  [] Other:         PLAN: See eval  [] Continue per plan of care [] Alter current plan (see comments above)  [x] Plan of care initiated [] Hold pending MD visit [] Discharge        Therapeutic Exercise and NMR EXR  [x] (35020) Provided verbal/tactile cueing for activities related to strengthening, flexibility, endurance, ROM  for improvements in proximal strength and core control with self care, mobility, lifting and ambulation.  [] (72370) Provided verbal/tactile cueing for activities related to improving balance, coordination, kinesthetic sense, posture, motor skill, proprioception  to assist with core control in self care, mobility, lifting, and ambulation. Therapeutic Activities and Gait:    [] (66594 or 06138) Provided verbal/tactile cueing for activities related to improving balance, coordination, kinesthetic sense, posture, motor skill, proprioception and motor activation to allow for proper function  with self care and ADLs  [] (61197) Provided training and instruction to the patient for proper core and proximal hip recruitment and positioning with ambulation re-education     Home Exercise Program:    [x] (17981) Reviewed/Progressed HEP activities related to strengthening, flexibility, endurance, ROM of core, proximal hip and LE for functional self-care, mobility, lifting and ambulation   [] (30660) Reviewed/Progressed HEP activities related to improving balance, coordination, kinesthetic sense, posture, motor skill, proprioception of core, proximal hip and LE for self care, mobility, lifting, and ambulation      Manual Treatments:  PROM / STM / Oscillations-Mobs:  G-I, II, III, IV (PA's, Inf., Post.)  [] (41444) Provided manual therapy to mobilize proximal hip and LS spine soft tissue/joints for the purpose of modulating pain, promoting relaxation,  increasing ROM, reducing/eliminating soft tissue swelling/inflammation/restriction, improving soft tissue extensibility and allowing for proper ROM for normal function with self care, mobility, lifting and ambulation.      Modalities:       Charges:  Timed Code Treatment Minutes: 30   Total Treatment Minutes: 45     Medicare Cap total YTD:        [x]N/A  Workers Comp Time Stamp  (Per CPT and Total Treatment) [x]N/A   Time In:   Time Out:       [x] EVAL  [x] XD(18365) x  1   [] IONTO  [] NMR (38501) x     [] VASO  [] Manual (51989) x      [] Other:  [] TA x     [] Gait x   [] Mech Traction (46925)  [] ES(attended) (28042)     [] ES (un) (08506):       Electronically signed by: Haydee Drake PT, DPT, Yen Howard #[id number]        Note: If patient does not return for scheduled/ recommended follow up visits, this note will serve as a discharge from care along with most recent update on progress.

## 2021-06-11 ENCOUNTER — HOSPITAL ENCOUNTER (OUTPATIENT)
Dept: PHYSICAL THERAPY | Age: 41
Setting detail: THERAPIES SERIES
Discharge: HOME | End: 2021-06-11
Payer: OTHER GOVERNMENT

## 2021-06-11 PROCEDURE — 97110 THERAPEUTIC EXERCISES: CPT

## 2021-06-11 PROCEDURE — 97140 MANUAL THERAPY 1/> REGIONS: CPT

## 2021-06-11 NOTE — FLOWSHEET NOTE
Physical Therapy Daily Treatment Note  Date:  2021    Patient Name:  Ivone Garner    :  1980  MRN: [id number]      Medical/Treatment Diagnosis Information:  · Diagnosis: Acute pain of left shoulder (M25.512), chronic bilateral low back pain with bilateral sciatica (M54.42)  · Treatment Diagnosis: low back pain with sciatica, L shoulder pain with mobility deficits    Insurance/Certification information:  PT Insurance Information: , pre-auth required, $21 co-pay    Physician Information:  Referring Practitioner: Xavi Fair DO    Plan of care signed :  []  Yes  [] No  []  Cosign []  Fax    Date of Patient follow up with Physician:     Is this a Progress Report:     []  Yes  [x]  No      If Yes:  Date Range for reporting period:  Beginning 2021  Ending    Progress report will be due (10 Rx or 30 days whichever is less): 67      Recertification will be due (POC Duration  / 90 days whichever is less): 12 visits      Visit # Insurance Allowable Auth Required     16 authorized [x]  Yes []  No        Functional Scale: GISSELLE      Date 21  Score: 27/50 = 54% disability    Functional Scale: SPADI      Date 21  Score: 57/130 = 44% disability      Latex Allergy:  [x]NO      []YES  Preferred Language for Healthcare:   [x]English       []other:    RESTRICTIONS/PRECAUTIONS: previous hx of laminectomy     SUBJECTIVE:    · Pt reports that his pain is about the same today as it was at the last visit. He reports that he continues to have fairly constant pain down both of his LEs, with his L being worse than his R. Pain level: 5/10      Plan Moving Forward/ For next visit:    Centralize LE symptoms    Progress to standing trunk strengthening activities as tolerated. OBJECTIVE: See eval        Exercises/Interventions:     Exercises in bold performed in department today.   Items not bolded are carried forward from prior visits for continuity of the record. Exercise/Equipment Resistance/Repetitions HEP Other comments   Sidelying sciatic nerve glide in R sidelying   2 min [x]    Supine sciatic nerve glide, R LE   2 min [x]    Prone press-up   2 min; 1 min each with LE pointed directionally [x]    Bridging   2 x 10 [x]    Lumbar roll stretch   2 min each side []    Lumbar lateral glides   1 min each side [] Performed partially unweighted with moving hips to L       []        []        []          []         []        []        []        []        []        []        []        []      Therapeutic Exercise/Home Exercise Program:   15 minutes  Pt inst in role of PT, prognosis, plan of care, use of CP/HP, activity modification, and benefits of therapy. HEP has been established, See above, pt given handout(s) of new exercises. Updated HEP given to pt today, reviewed with patient at end of session. See HEP exercises below. Current HEP:  Sidelying sciatic nerve glide in R sidelying  Supine sciatic nerve glide, R LE    Access Code: [email or web address]  URL: [email or web address]  Date: 06/11/2021  Prepared by: Delilah Rutledge    Exercises  Prone Press Up on Elbows - 1 x daily - 5-7 x weekly - 1-2 sets - 10-20 reps - 5-10 seconds hold  [address] Lumbar Extension Press Up - 1 x daily - 5-7 x weekly - 1-2 sets - 10-20 reps - 5-10 seconds hold  Supine Bridge - 1 x daily - 5-7 x weekly - 2-3 sets - 8-12 reps      Therapeutic Activity:  0 minutes     Gait: 0 minutes    Neuromuscular Re-Education:  0 minutes      Canalith Repositioning Procedure:      Manual Therapy:  10 minutes  Long axis distraction      Modalities: 0 minutes      ASSESSMENT:    · Increased discomfort with stretching of L lumbar spine with R lateral flexion and with lumbar side gliding. · Pt reports having a mild decrease in symptoms by the end of today's session, although he notes his back felt sore from the increased amount of exercise.    · Pt was able tolerate repeated movements into extension with minimal to no change in symptoms. · Will discuss pt's response to his updated HEP at next visit - seeing how he responds to extension-based exercises. Goals:   Patient stated goal:  \"I would just like to be able to have less pain and more movement\"  []? Progressing: []? Met: []? Not Met: []? Adjusted     Therapist goals for Patient:   Short Term Goals: To be achieved in: 4 weeks  1. Independent in HEP and progression per patient tolerance, in order to prevent re-injury. []? Progressing: []? Met: []? Not Met: []? Adjusted  2. Patient's will have a decrease in pain to 4/10 on average to facilitate improvement in movement, function, and ADLs as indicated by Functional Deficits. []? Progressing: []? Met: []? Not Met: []? Adjusted     Long Term Goals: To be achieved in: 8 weeks  1. Disability index score of 40% or less for the GISSELLE to assist with improving functional mobility and capacity. []? Progressing: []? Met: []? Not Met: []? Adjusted  2. Patient will demonstrate increased lumbar AROM to 100% WNL with no LE symptoms. []? Progressing: []? Met: []? Not Met: []? Adjusted  3. Patient will demonstrate an increase in NM recruitment/activation and overall GH and scapular strength to within n5lbs HHD or WNL for proper functional mobility as indicated by patients Functional Deficits. []? Progressing: []? Met: []? Not Met: []? Adjusted  4. Patient will complete a 20 minute strength training workout with minimal to no low back pain. []? Progressing: []? Met: []? Not Met: []? Adjusted    Overall Progression Towards Functional goals/ Treatment Progress Update:  [] Patient is progressing as expected towards functional goals listed. [] Progression is slowed due to complexities/Impairments listed. [] Progression has been slowed due to co-morbidities.   [x] Plan just implemented, too soon to assess goals progression <30days   [] Goals require adjustment due to lack of progress  [] Patient is not Treatments:  PROM / STM / Oscillations-Mobs:  G-I, II, III, IV (PA's, Inf., Post.)  [] (49684) Provided manual therapy to mobilize proximal hip and LS spine soft tissue/joints for the purpose of modulating pain, promoting relaxation,  increasing ROM, reducing/eliminating soft tissue swelling/inflammation/restriction, improving soft tissue extensibility and allowing for proper ROM for normal function with self care, mobility, lifting and ambulation. Modalities:       Charges:  Timed Code Treatment Minutes: 45   Total Treatment Minutes: 45     Medicare Cap total YTD:        [x]N/A  Workers Comp Time Stamp  (Per CPT and Total Treatment) [x]N/A   Time In:   Time Out:       [] EVAL  [x] JH(19592) x  2   [] IONTO  [] NMR (05968) x     [] VASO  [x] Manual (10655) x 1     [] Other:  [] TA x     [] Gait x   [] Mech Traction (32821)  [] ES(attended) (44398)     [] ES (un) (90622):       Electronically signed by: Ramirez Escalante PT, DPT, Umang Uriarte #[id number]        Note: If patient does not return for scheduled/ recommended follow up visits, this note will serve as a discharge from care along with most recent update on progress.

## 2021-06-14 ENCOUNTER — HOSPITAL ENCOUNTER (OUTPATIENT)
Dept: PHYSICAL THERAPY | Age: 41
Setting detail: THERAPIES SERIES
Discharge: HOME | End: 2021-06-14
Payer: OTHER GOVERNMENT

## 2021-06-14 PROCEDURE — 97110 THERAPEUTIC EXERCISES: CPT

## 2021-06-14 PROCEDURE — 97140 MANUAL THERAPY 1/> REGIONS: CPT

## 2021-06-14 NOTE — FLOWSHEET NOTE
Physical Therapy Daily Treatment Note  Date:  2021    Patient Name:  Rayna Cash    :  1980  MRN: [id number]      Medical/Treatment Diagnosis Information:  · Diagnosis: Acute pain of left shoulder (M25.512), chronic bilateral low back pain with bilateral sciatica (M54.42)  · Treatment Diagnosis: low back pain with sciatica, L shoulder pain with mobility deficits    Insurance/Certification information:  PT Insurance Information: , pre-auth required, $21 co-pay    Physician Information:  Referring Practitioner: Mraia Isabel Francis DO    Plan of care signed :  []  Yes  [] No  []  Cosign []  Fax    Date of Patient follow up with Physician:     Is this a Progress Report:     []  Yes  [x]  No      If Yes:  Date Range for reporting period:  Beginning 2021  Ending    Progress report will be due (10 Rx or 30 days whichever is less): 3/6/78      Recertification will be due (POC Duration  / 90 days whichever is less): 12 visits      Visit # Insurance Allowable Auth Required   3/12  16 authorized [x]  Yes []  No        Functional Scale: GISSELLE      Date 21  Score: 27/50 = 54% disability    Functional Scale: SPADI      Date 21  Score: 57/130 = 44% disability      Latex Allergy:  [x]NO      []YES  Preferred Language for Healthcare:   [x]English       []other:    RESTRICTIONS/PRECAUTIONS: previous hx of laminectomy     SUBJECTIVE:    · Says that his back and LEs are feeling about the same  · Says that his L shoulder is still bothering it, he still avoids using it at this time  · Reports that he had some pain down his LEs with prone press-up exercises    Pain level: not assessed today. Plan Moving Forward/ For next visit:    Centralize LE symptoms    Progress to standing trunk strengthening activities as tolerated. OBJECTIVE:     Exercises/Interventions:     Exercises in bold performed in department today.   Items not bolded are carried forward from prior visits for continuity of therapist      Modalities: 0 minutes      ASSESSMENT:    · Pt was able to tolerate seated sciatic nerve glides to L LE today with only minimal increase in symptoms  · Pt given updated HEP to better address his limited tolerance & capacity for loading his L lumbar spine and L LE    Goals:   Patient stated goal:  \"I would just like to be able to have less pain and more movement\"  []? Progressing: []? Met: []? Not Met: []? Adjusted     Therapist goals for Patient:   Short Term Goals: To be achieved in: 4 weeks  1. Independent in HEP and progression per patient tolerance, in order to prevent re-injury. []? Progressing: []? Met: []? Not Met: []? Adjusted  2. Patient's will have a decrease in pain to 4/10 on average to facilitate improvement in movement, function, and ADLs as indicated by Functional Deficits. []? Progressing: []? Met: []? Not Met: []? Adjusted     Long Term Goals: To be achieved in: 8 weeks  1. Disability index score of 40% or less for the GISSELLE to assist with improving functional mobility and capacity. []? Progressing: []? Met: []? Not Met: []? Adjusted  2. Patient will demonstrate increased lumbar AROM to 100% WNL with no LE symptoms. []? Progressing: []? Met: []? Not Met: []? Adjusted  3. Patient will demonstrate an increase in NM recruitment/activation and overall GH and scapular strength to within n5lbs HHD or WNL for proper functional mobility as indicated by patients Functional Deficits. []? Progressing: []? Met: []? Not Met: []? Adjusted  4. Patient will complete a 20 minute strength training workout with minimal to no low back pain. []? Progressing: []? Met: []? Not Met: []? Adjusted    Overall Progression Towards Functional goals/ Treatment Progress Update:  [] Patient is progressing as expected towards functional goals listed. [] Progression is slowed due to complexities/Impairments listed. [] Progression has been slowed due to co-morbidities.   [x] Plan just implemented, too soon to assess goals progression <30days   [] Goals require adjustment due to lack of progress  [] Patient is not progressing as expected and requires additional follow up with physician  [] Other    Prognosis for POC: [x] Good [] Fair  [] Poor    Patient requires continued skilled intervention: [x] Yes  [] No    Treatment/Activity Tolerance:  [x] Patient able to complete treatment  [] Patient limited by fatigue  [] Patient limited by pain    [] Patient limited by other medical complications  [] Other:         PLAN: See eval  [x] Continue per plan of care [] Alter current plan (see comments above)  [] Plan of care initiated [] Hold pending MD visit [] Discharge        Therapeutic Exercise and NMR EXR  [x] (11280) Provided verbal/tactile cueing for activities related to strengthening, flexibility, endurance, ROM  for improvements in proximal strength and core control with self care, mobility, lifting and ambulation.  [] (40329) Provided verbal/tactile cueing for activities related to improving balance, coordination, kinesthetic sense, posture, motor skill, proprioception  to assist with core control in self care, mobility, lifting, and ambulation.      Therapeutic Activities and Gait:    [] (17407 or 19419) Provided verbal/tactile cueing for activities related to improving balance, coordination, kinesthetic sense, posture, motor skill, proprioception and motor activation to allow for proper function  with self care and ADLs  [] (71285) Provided training and instruction to the patient for proper core and proximal hip recruitment and positioning with ambulation re-education     Home Exercise Program:    [x] (30165) Reviewed/Progressed HEP activities related to strengthening, flexibility, endurance, ROM of core, proximal hip and LE for functional self-care, mobility, lifting and ambulation   [] (33612) Reviewed/Progressed HEP activities related to improving balance, coordination, kinesthetic sense, posture, motor skill, proprioception of core, proximal hip and LE for self care, mobility, lifting, and ambulation      Manual Treatments:  PROM / STM / Oscillations-Mobs:  G-I, II, III, IV (PA's, Inf., Post.)  [x] (91709) Provided manual therapy to mobilize proximal hip and LS spine soft tissue/joints for the purpose of modulating pain, promoting relaxation,  increasing ROM, reducing/eliminating soft tissue swelling/inflammation/restriction, improving soft tissue extensibility and allowing for proper ROM for normal function with self care, mobility, lifting and ambulation. Modalities:       Charges:  Timed Code Treatment Minutes: 40   Total Treatment Minutes: 40     Medicare Cap total YTD:        [x]N/A  Workers Comp Time Stamp  (Per CPT and Total Treatment) [x]N/A   Time In:   Time Out:       [] EVAL  [x] DK(94407) x  2   [] IONTO  [] NMR (54126) x     [] VASO  [x] Manual (09262) x 1     [] Other:  [] TA x     [] Gait x   [] [facility] Traction (20442)  [] ES(attended) (06676)     [] ES (un) (98690):       Electronically signed by: Shania Welch PT, Terry MAURICIO [facility] #[id number]        Note: If patient does not return for scheduled/ recommended follow up visits, this note will serve as a discharge from care along with most recent update on progress.

## 2021-06-18 ENCOUNTER — HOSPITAL ENCOUNTER (OUTPATIENT)
Dept: PHYSICAL THERAPY | Age: 41
Setting detail: THERAPIES SERIES
Discharge: HOME | End: 2021-06-18
Payer: OTHER GOVERNMENT

## 2021-06-18 PROCEDURE — 97110 THERAPEUTIC EXERCISES: CPT

## 2021-06-18 PROCEDURE — 97140 MANUAL THERAPY 1/> REGIONS: CPT

## 2021-06-18 NOTE — FLOWSHEET NOTE
record. Exercise/Equipment Resistance/Repetitions HEP Other comments   Sidelying sciatic nerve glide in R sidelying   2 min []    Supine sciatic nerve glide, R LE   2 min [x]    Prone press-up   2 min; 1 min each with LE pointed directionally [x]    Bridging   2 x 10 [x]    Lumbar roll stretch   1 min in L sidelying [x] Reviewed for HEP   Lumbar lateral glides   2 x 1 min hips to L [] Performed partially unweighted with moving hips to L   Recumbent bike   4 min, RPE 4-6/10 []    SLR with contralateral arm extended  TA activation + supine marching   1 x 10 holding 5# DB, 2 x 10 holding 8# DB  2 x 12 []    Seated sciatic nerve glide  2 x 1 min [x]     Single knee to chest    5 x 15 sec hold bilaterally [] Using towel to pull knee towards chest   Lumbar rotation dynamic stretch    2 min []    Side plank   6 reps each side [] HOLD until L shoulder pain improves       []        []        []        []        []      Therapeutic Exercise/Home Exercise Program:   25 minutes  Pt inst in role of PT, prognosis, plan of care, use of CP/HP, activity modification, and benefits of therapy. HEP has been established, See above, pt given handout(s) of new exercises. Updated HEP given to pt today, reviewed with patient at end of session. See HEP exercises below. Access Code: [email or web address]  URL: [email or web address]  Date: 06/14/2021  Prepared by: Anish Fine    Exercises  Supine Bridge - 1 x daily - 5-7 x weekly - 2-3 sets - 8-12 reps  Seated Sciatic Tensioner - 1-2 x daily - 5-7 x weekly - 2 sets - 20 reps  Left Standing Lateral Shift Correction at Wall - Repetitions - 2-3 x daily - 5-7 x weekly - 1 sets - 20 reps  Supine Shoulder Flexion Extension AAROM with Dowel - 1 x daily - 5-7 x weekly - 2 sets - 10-15 reps - 5-10 seconds hold  Supine Shoulder Abduction AAROM with Dowel - 1 x daily - 5-7 x weekly - 2-3 sets - 15-20 reps - 5-10 seconds hold        Therapeutic Activity:  0 minutes     Gait: 0 minutes    Neuromuscular Re-Education:  0 minutes      Canalith Repositioning Procedure:      Manual Therapy:  20 minutes  Manual traction   L hip inferior mobilization + passive stretching into hip flexion  Long axis distraction  PNF agonist reversal L hip flexion/extension  Lumbar lateral glides, hips to L w/overpressure from therapist      Modalities: 0 minutes      ASSESSMENT:    · Pt reported having improved mobility in his lower back following the manual traction. · Overall, pt reports that his lower back and LEs feel more irritated at the end of sessions due to the exercises performed during the session. · Pt's L hip flexion was limited before manual therapy, hip flexion mobility improved following the manual intervention. Goals:   Patient stated goal:  \"I would just like to be able to have less pain and more movement\"  []? Progressing: []? Met: []? Not Met: []? Adjusted     Therapist goals for Patient:   Short Term Goals: To be achieved in: 4 weeks  1. Independent in HEP and progression per patient tolerance, in order to prevent re-injury. []? Progressing: []? Met: []? Not Met: []? Adjusted  2. Patient's will have a decrease in pain to 4/10 on average to facilitate improvement in movement, function, and ADLs as indicated by Functional Deficits. []? Progressing: []? Met: []? Not Met: []? Adjusted     Long Term Goals: To be achieved in: 8 weeks  1. Disability index score of 40% or less for the GISSELLE to assist with improving functional mobility and capacity. []? Progressing: []? Met: []? Not Met: []? Adjusted  2. Patient will demonstrate increased lumbar AROM to 100% WNL with no LE symptoms. []? Progressing: []? Met: []? Not Met: []? Adjusted  3. Patient will demonstrate an increase in NM recruitment/activation and overall GH and scapular strength to within n5lbs HHD or WNL for proper functional mobility as indicated by patients Functional Deficits. []? Progressing: []? Met: []?  Not Met: []? Adjusted  4. Patient will complete a 20 minute strength training workout with minimal to no low back pain. []? Progressing: []? Met: []? Not Met: []? Adjusted    Overall Progression Towards Functional goals/ Treatment Progress Update:  [] Patient is progressing as expected towards functional goals listed. [] Progression is slowed due to complexities/Impairments listed. [] Progression has been slowed due to co-morbidities. [x] Plan just implemented, too soon to assess goals progression <30days   [] Goals require adjustment due to lack of progress  [] Patient is not progressing as expected and requires additional follow up with physician  [] Other    Prognosis for POC: [x] Good [] Fair  [] Poor    Patient requires continued skilled intervention: [x] Yes  [] No    Treatment/Activity Tolerance:  [x] Patient able to complete treatment  [] Patient limited by fatigue  [] Patient limited by pain    [] Patient limited by other medical complications  [] Other:         PLAN: See eval  [x] Continue per plan of care [] Alter current plan (see comments above)  [] Plan of care initiated [] Hold pending MD visit [] Discharge        Therapeutic Exercise and NMR EXR  [x] (83307) Provided verbal/tactile cueing for activities related to strengthening, flexibility, endurance, ROM  for improvements in proximal strength and core control with self care, mobility, lifting and ambulation.  [] (83439) Provided verbal/tactile cueing for activities related to improving balance, coordination, kinesthetic sense, posture, motor skill, proprioception  to assist with core control in self care, mobility, lifting, and ambulation.      Therapeutic Activities and Gait:    [] (39667 or 12418) Provided verbal/tactile cueing for activities related to improving balance, coordination, kinesthetic sense, posture, motor skill, proprioception and motor activation to allow for proper function  with self care and ADLs  [] (12739) Provided training and

## 2021-06-21 ENCOUNTER — HOSPITAL ENCOUNTER (OUTPATIENT)
Dept: PHYSICAL THERAPY | Age: 41
Setting detail: THERAPIES SERIES
Discharge: HOME | End: 2021-06-21
Payer: OTHER GOVERNMENT

## 2021-06-21 PROCEDURE — 97110 THERAPEUTIC EXERCISES: CPT

## 2021-06-21 PROCEDURE — 97140 MANUAL THERAPY 1/> REGIONS: CPT

## 2021-06-21 PROCEDURE — 97012 MECHANICAL TRACTION THERAPY: CPT

## 2021-06-21 NOTE — FLOWSHEET NOTE
weekly - 2-3 sets - 8-15 reps  Seated Lumbar Flexion Stretch - 1-2 x daily - 5-7 x weekly - 1-2 sets - 10 reps - 5-10 hold        Therapeutic Activity:  0 minutes     Gait: 0 minutes    Neuromuscular Re-Education:  0 minutes      Canalith Repositioning Procedure:      Manual Therapy:  10 minutes  Manual traction   L hip inferior mobilization + passive stretching into hip flexion  L hip lateral mobilization + passive stretching into hip IR  Long axis distraction  PNF agonist reversal L hip flexion/extension  Lumbar lateral glides, hips to L w/overpressure from therapist      Modalities: 20 minutes  Mechanical lumbar traction, 15 minutes, 45 sec on/15 sec off, 60 kg pull on/30 kg pull off, pt LEs elevated on stool  Pt reported no increase in pain during activity    ASSESSMENT:    ·  Pt tolerated supine sciatic nerve glide on L LE during today's visit, however both LEs fatigue quickly with this activity. · Will assess pt's response to mechanical traction at next visit. Goals:   Patient stated goal:  \"I would just like to be able to have less pain and more movement\"  []? Progressing: []? Met: []? Not Met: []? Adjusted     Therapist goals for Patient:   Short Term Goals: To be achieved in: 4 weeks  1. Independent in HEP and progression per patient tolerance, in order to prevent re-injury. []? Progressing: []? Met: []? Not Met: []? Adjusted  2. Patient's will have a decrease in pain to 4/10 on average to facilitate improvement in movement, function, and ADLs as indicated by Functional Deficits. []? Progressing: []? Met: []? Not Met: []? Adjusted     Long Term Goals: To be achieved in: 8 weeks  1. Disability index score of 40% or less for the GISSELLE to assist with improving functional mobility and capacity. []? Progressing: []? Met: []? Not Met: []? Adjusted  2. Patient will demonstrate increased lumbar AROM to 100% WNL with no LE symptoms. []? Progressing: []? Met: []? Not Met: []? Adjusted  3.  Patient will demonstrate an increase in NM recruitment/activation and overall GH and scapular strength to within n5lbs HHD or WNL for proper functional mobility as indicated by patients Functional Deficits. []? Progressing: []? Met: []? Not Met: []? Adjusted  4. Patient will complete a 20 minute strength training workout with minimal to no low back pain. []? Progressing: []? Met: []? Not Met: []? Adjusted    Overall Progression Towards Functional goals/ Treatment Progress Update:  [] Patient is progressing as expected towards functional goals listed. [] Progression is slowed due to complexities/Impairments listed. [] Progression has been slowed due to co-morbidities. [x] Plan just implemented, too soon to assess goals progression <30days   [] Goals require adjustment due to lack of progress  [] Patient is not progressing as expected and requires additional follow up with physician  [] Other    Prognosis for POC: [x] Good [] Fair  [] Poor    Patient requires continued skilled intervention: [x] Yes  [] No    Treatment/Activity Tolerance:  [x] Patient able to complete treatment  [] Patient limited by fatigue  [] Patient limited by pain    [] Patient limited by other medical complications  [] Other:         PLAN: See eval  [x] Continue per plan of care [] Alter current plan (see comments above)  [] Plan of care initiated [] Hold pending MD visit [] Discharge        Therapeutic Exercise and NMR EXR  [x] (00050) Provided verbal/tactile cueing for activities related to strengthening, flexibility, endurance, ROM  for improvements in proximal strength and core control with self care, mobility, lifting and ambulation.  [] (35642) Provided verbal/tactile cueing for activities related to improving balance, coordination, kinesthetic sense, posture, motor skill, proprioception  to assist with core control in self care, mobility, lifting, and ambulation.      Therapeutic Activities and Gait:    [] (67594 or 40755) Provided verbal/tactile cueing for activities related to improving balance, coordination, kinesthetic sense, posture, motor skill, proprioception and motor activation to allow for proper function  with self care and ADLs  [] (85788) Provided training and instruction to the patient for proper core and proximal hip recruitment and positioning with ambulation re-education     Home Exercise Program:    [x] (10163) Reviewed/Progressed HEP activities related to strengthening, flexibility, endurance, ROM of core, proximal hip and LE for functional self-care, mobility, lifting and ambulation   [] (18875) Reviewed/Progressed HEP activities related to improving balance, coordination, kinesthetic sense, posture, motor skill, proprioception of core, proximal hip and LE for self care, mobility, lifting, and ambulation      Manual Treatments:  PROM / STM / Oscillations-Mobs:  G-I, II, III, IV (PA's, Inf., Post.)  [x] (16094) Provided manual therapy to mobilize proximal hip and LS spine soft tissue/joints for the purpose of modulating pain, promoting relaxation,  increasing ROM, reducing/eliminating soft tissue swelling/inflammation/restriction, improving soft tissue extensibility and allowing for proper ROM for normal function with self care, mobility, lifting and ambulation.      Modalities:       Charges:  Timed Code Treatment Minutes: 40   Total Treatment Minutes: 60     Medicare Cap total YTD:        [x]N/A  Workers Comp Time Stamp  (Per CPT and Total Treatment) [x]N/A   Time In:   Time Out:       [] EVAL  [x] XC(28884) x  2   [] IONTO  [] NMR (45312) x     [] VASO  [x] Manual (91130) x 1     [] Other:  [] TA x     [] Gait x   [x] [facility] (78764[facility]  [] ES(attended) (17217)     [] ES (un) (80022):       Electronically signed by: Richelle Jacob PT, DPT, Michell Perea #[id number]        Note: If patient does not return for scheduled/ recommended follow up visits, this note will serve as a discharge from care along with most recent update on progress.

## 2021-06-25 ENCOUNTER — HOSPITAL ENCOUNTER (OUTPATIENT)
Dept: PHYSICAL THERAPY | Age: 41
Setting detail: THERAPIES SERIES
Discharge: HOME | End: 2021-06-25
Payer: OTHER GOVERNMENT

## 2021-06-25 PROCEDURE — 97110 THERAPEUTIC EXERCISES: CPT

## 2021-06-25 PROCEDURE — 97140 MANUAL THERAPY 1/> REGIONS: CPT

## 2021-06-25 PROCEDURE — 97012 MECHANICAL TRACTION THERAPY: CPT

## 2021-06-25 NOTE — FLOWSHEET NOTE
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.42)  · Treatment Diagnosis: low back pain with sciatica, L shoulder pain with mobility deficits    Insurance/Certification information:  PT Insurance Information: , pre-auth required, $21 co-pay    Physician Information:  Referring Practitioner: Judene Litten, DO    Plan of care signed :  []  Yes  [x] No  []  Cosign [x]  Fax 21     Date of Patient follow up with Physician:     Is this a Progress Report:     []  Yes  [x]  No      If Yes:  Date Range for reporting period:  Beginning 2021  Ending    Progress report will be due (10 Rx or 30 days whichever is less): 02      Recertification will be due (POC Duration  / 90 days whichever is less): 12 visits      Visit # Insurance Allowable Auth Required     16 authorized [x]  Yes []  No        Functional Scale: GISSELLE      Date 21  Score: 27/50 = 54% disability    Functional Scale: SPADI      Date 21  Score: 57/130 = 44% disability      Latex Allergy:  [x]NO      []YES  Preferred Language for Healthcare:   [x]English       []other:    RESTRICTIONS/PRECAUTIONS: previous hx of laminectomy     SUBJECTIVE:    · Pt arrived in clinic 15 minutes late for appointment  · No significant change in symptoms since last visit. · Pt reports that he felt more loose after manual traction performed at last visit. · Pt reports that he feels \"tight all over\" today       Pain level: 10      Plan Moving Forward/ For next visit:    Centralize LE symptoms    Progress to standing trunk strengthening activities as tolerated. OBJECTIVE:    Lumbar side gliding: less ROM with hips to L     Exercises/Interventions:     Exercises in bold performed in department today. Items not bolded are carried forward from prior visits for continuity of the record. Exercise/Equipment Resistance/Repetitions HEP Other comments   Sidelying sciatic nerve glide in R sidelying   2 min []    Supine sciatic nerve glide  1 min 15 sec [x]    Prone press-up   2 min; 1 min each with LE pointed directionally [x]    Bridging   2 x 10 [x]    Lumbar roll stretch   1 min in L sidelying [x] Reviewed for HEP   Lumbar lateral glides   3 x 20-25 reps hips to L []    Recumbent bike   3 min, RPE 4-6/10 []    Elliptical  2 min     SLR with contralateral arm extended  TA activation + supine marching   1 x 10 knees to chest, 2 x 10 holding 8# DB  2 x 12 []    Seated sciatic nerve glide  2 x 1 min [x]     Single knee to chest    5 x 15 sec hold bilaterally [] Using towel to pull knee towards chest   Lumbar rotation dynamic stretch    1 min 30 sec []    Side plank   6 reps each side [] HOLD until L shoulder pain improves   Seated lumbar flexion  1 min, 5 second holds []    Forward lunge  2 x 10, holding 9# in L hand [] L LE only   Goblet squat with slow eccentric   2 x 8-12, holding 9# weight []        []        []      Therapeutic Exercise/Home Exercise Program:   20 minutes  Pt inst in role of PT, prognosis, plan of care, use of CP/HP, activity modification, and benefits of therapy. HEP has been established, See above, pt given handout(s) of new exercises. Updated HEP given to pt today, reviewed with patient at end of session. See HEP exercises below. Access Code: [email or web address]  URL: [email or web address]  Date: 06/21/2021  Prepared by: Jina Hernandez    Exercises  Supine Bridge - 1 x daily - 3-4 x weekly - 2-3 sets - 8-12 reps  Left Standing Lateral Shift Correction at Wall - Repetitions - 2-3 x daily - 5-7 x weekly - 1 sets - 20 reps  Supine Shoulder Flexion Extension AAROM with Dowel - 1 x daily - 5-7 x weekly - 2 sets - 10-15 reps - 5-10 seconds hold  Supine Shoulder Abduction AAROM with Dowel - 1 x daily - 5-7 x weekly - 2-3 sets - 15-20 reps - 5-10 seconds hold  Supine Sciatic Nerve Glide - 1 x daily - 5-7 x weekly - 2-3 sets - 8-12 reps  Active Straight Leg Raise Advanced - 1 x daily - 3-4 x weekly - 2-3 sets - 8-15 reps  Seated Lumbar Flexion Stretch - 1-2 x daily - 5-7 x weekly - 1-2 sets - 10 reps - 5-10 hold        Therapeutic Activity:  0 minutes     Gait: 0 minutes    Neuromuscular Re-Education:  0 minutes      Canalith Repositioning Procedure:      Manual Therapy:  10 minutes   Manual traction   L hip inferior mobilization + passive stretching into hip flexion  L hip lateral mobilization + passive stretching into hip IR  Long axis distraction  PNF agonist reversal L hip flexion/extension  Lumbar lateral glides, hips to L w/overpressure from therapist      Modalities: 18 minutes HOLD mechanical traction at future visits  Mechanical lumbar traction, 12 minutes, 45 sec on/15 sec off, 56 kg pull on/28 kg pull off, pt LEs elevated on stool  Pt reported no increase in pain during activity    ASSESSMENT:    ·  Lumbar mobility with lateral movements to L improved following manual treatment  · Pt reported having increased discomfort in his lower back during and after mechanical traction    Goals:   Patient stated goal:  \"I would just like to be able to have less pain and more movement\"  []? Progressing: []? Met: []? Not Met: []? Adjusted     Therapist goals for Patient:   Short Term Goals: To be achieved in: 4 weeks  1. Independent in HEP and progression per patient tolerance, in order to prevent re-injury. []? Progressing: []? Met: []? Not Met: []? Adjusted  2. Patient's will have a decrease in pain to 4/10 on average to facilitate improvement in movement, function, and ADLs as indicated by Functional Deficits. []? Progressing: []? Met: []? Not Met: []? Adjusted     Long Term Goals: To be achieved in: 8 weeks  1.  Disability index score of 40% or less for the GISSELLE to assist with improving functional mobility and capacity. []? Progressing: []? Met: []? Not Met: []? Adjusted  2. Patient will demonstrate increased lumbar AROM to 100% WNL with no LE symptoms. []? Progressing: []? Met: []? Not Met: []? Adjusted  3. Patient will demonstrate an increase in NM recruitment/activation and overall GH and scapular strength to within n5lbs HHD or WNL for proper functional mobility as indicated by patients Functional Deficits. []? Progressing: []? Met: []? Not Met: []? Adjusted  4. Patient will complete a 20 minute strength training workout with minimal to no low back pain. []? Progressing: []? Met: []? Not Met: []? Adjusted    Overall Progression Towards Functional goals/ Treatment Progress Update:  [] Patient is progressing as expected towards functional goals listed. [] Progression is slowed due to complexities/Impairments listed. [] Progression has been slowed due to co-morbidities.   [x] Plan just implemented, too soon to assess goals progression <30days   [] Goals require adjustment due to lack of progress  [] Patient is not progressing as expected and requires additional follow up with physician  [] Other    Prognosis for POC: [x] Good [] Fair  [] Poor    Patient requires continued skilled intervention: [x] Yes  [] No    Treatment/Activity Tolerance:  [x] Patient able to complete treatment  [] Patient limited by fatigue  [] Patient limited by pain    [] Patient limited by other medical complications  [] Other:         PLAN: See eval  [x] Continue per plan of care [] Alter current plan (see comments above)  [] Plan of care initiated [] Hold pending MD visit [] Discharge        Therapeutic Exercise and NMR EXR  [x] (99950) Provided verbal/tactile cueing for activities related to strengthening, flexibility, endurance, ROM  for improvements in proximal strength and core control with self care, mobility, lifting and ambulation.  [] (60069) Provided verbal/tactile cueing for activities related to improving balance, coordination, kinesthetic sense, posture, motor skill, proprioception  to assist with core control in self care, mobility, lifting, and ambulation. Therapeutic Activities and Gait:    [] (11098 or 70701) Provided verbal/tactile cueing for activities related to improving balance, coordination, kinesthetic sense, posture, motor skill, proprioception and motor activation to allow for proper function  with self care and ADLs  [] (80654) Provided training and instruction to the patient for proper core and proximal hip recruitment and positioning with ambulation re-education     Home Exercise Program:    [x] (02989) Reviewed/Progressed HEP activities related to strengthening, flexibility, endurance, ROM of core, proximal hip and LE for functional self-care, mobility, lifting and ambulation   [] (98950) Reviewed/Progressed HEP activities related to improving balance, coordination, kinesthetic sense, posture, motor skill, proprioception of core, proximal hip and LE for self care, mobility, lifting, and ambulation      Manual Treatments:  PROM / STM / Oscillations-Mobs:  G-I, II, III, IV (PA's, Inf., Post.)  [x] (34062) Provided manual therapy to mobilize proximal hip and LS spine soft tissue/joints for the purpose of modulating pain, promoting relaxation,  increasing ROM, reducing/eliminating soft tissue swelling/inflammation/restriction, improving soft tissue extensibility and allowing for proper ROM for normal function with self care, mobility, lifting and ambulation.      Modalities:       Charges:  Timed Code Treatment Minutes: 30   Total Treatment Minutes: 48     Medicare Cap total YTD:        [x]N/A  Workers Comp Time Stamp  (Per CPT and Total Treatment) [x]N/A   Time In:   Time Out:       [] EVAL  [x] JF(94460) x  1   [] IONTO  [] NMR (57193) x     [] VASO  [x] Manual (98855) x 1     [] Other:  [] TA x     [] Gait x   [x] Mech Traction (93975)  [] ES(attended) (25354)     [] ES (un) (33449): Electronically signed by: Kathy Bird PT, DPT, Alfa Cantu #[id number]        Note: If patient does not return for scheduled/ recommended follow up visits, this note will serve as a discharge from care along with most recent update on progress.

## 2021-06-28 ENCOUNTER — HOSPITAL ENCOUNTER (OUTPATIENT)
Dept: PHYSICAL THERAPY | Age: 41
Setting detail: THERAPIES SERIES
Discharge: HOME | End: 2021-06-28
Payer: OTHER GOVERNMENT

## 2021-06-28 PROCEDURE — 97140 MANUAL THERAPY 1/> REGIONS: CPT

## 2021-06-28 PROCEDURE — 97012 MECHANICAL TRACTION THERAPY: CPT

## 2021-06-28 PROCEDURE — 97110 THERAPEUTIC EXERCISES: CPT

## 2021-06-28 NOTE — FLOWSHEET NOTE
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.42)  · Treatment Diagnosis: low back pain with sciatica, L shoulder pain with mobility deficits    Insurance/Certification information:  PT Insurance Information: , pre-auth required, $21 co-pay    Physician Information:  Referring Practitioner: Silva Patel,     Plan of care signed :  []  Yes  [x] No  []  Cosign [x]  Fax 21     Date of Patient follow up with Physician:     Is this a Progress Report:     []  Yes  [x]  No      If Yes:  Date Range for reporting period:  Beginning 2021  Ending    Progress report will be due (10 Rx or 30 days whichever is less): 30      Recertification will be due (POC Duration  / 90 days whichever is less): 12 visits      Visit # Insurance Allowable Auth Required     16 authorized [x]  Yes []  No        Functional Scale: GISSELLE      Date 21  Score: 27/50 = 54% disability    Functional Scale: SPADI      Date 21  Score: 57/130 = 44% disability      Latex Allergy:  [x]NO      []YES  Preferred Language for Healthcare:   [x]English       []other:    RESTRICTIONS/PRECAUTIONS: previous hx of laminectomy     SUBJECTIVE:    · Pt arrived in clinic 15 minutes late for appointment  · No significant change in symptoms since last visit. · Pt reports that he felt more loose after manual traction performed at last visit. · Pt reports that he feels \"tight all over\" today       Pain level: 10      Plan Moving Forward/ For next visit:    Centralize LE symptoms    Progress to standing trunk strengthening activities as tolerated.    Omit mechanical txn        OBJECTIVE:    Lumbar side gliding: less ROM with hips to L     Exercises/Interventions:     Exercises in bold performed in department today. Items not bolded are carried forward from prior visits for continuity of the record. Exercise/Equipment Resistance/Repetitions HEP Other comments   Sidelying sciatic nerve glide in R sidelying   2 min []    Supine sciatic nerve glide  1 min 15 sec [x]    Prone press-up   2 min; 1 min each with LE pointed directionally [x]    Bridging   3 x 15 [x]    Bridge with sustained abd  RTB 3x15     Bridge with sustained ADD (Ball) 3x15     Lumbar roll stretch   1 min in L sidelying [x] Reviewed for HEP   Lumbar lateral glides   3 x 20-25 reps hips to L []    Recumbent bike   3 min, RPE 4-6/10 []    Elliptical  2 min     SLR with contralateral arm extended  TA activation + supine marching   1 x 10 knees to chest, 2 x 10 holding 8# DB  2 x 12 []    Seated sciatic nerve glide  2 x 1 min [x]     Single knee to chest    5 x 15 sec hold bilaterally [] Using towel to pull knee towards chest   Lumbar rotation dynamic stretch    1 min 30 sec []    Side plank   6 reps each side [] HOLD until L shoulder pain improves   Seated lumbar flexion  1 min, 5 second holds []    Forward lunge  2 x 10, holding 9# in L hand [] L LE only   Goblet squat with slow eccentric   2 x 8-12, holding 9# weight []        []        []      Therapeutic Exercise/Home Exercise Program:   15 minutes  Pt inst in role of PT, prognosis, plan of care, use of CP/HP, activity modification, and benefits of therapy. HEP has been established, See above, pt given handout(s) of new exercises. Updated HEP given to pt today, reviewed with patient at end of session. See HEP exercises below. Provided RTB    Access Code: [email or web address]  URL: [email or web address]  Date: 06/21/2021  Prepared by: Sregio Huerta    Exercises  Supine Bridge - 1 x daily - 3-4 x weekly - 2-3 sets - 8-12 reps  Left Standing Lateral Shift Correction at Wall - Repetitions - 2-3 x daily - 5-7 x weekly - 1 sets - 20 reps  Supine Shoulder Flexion Extension AAROM with Dowel - 1 x daily - 5-7 x weekly - 2 sets - 10-15 reps - 5-10 seconds hold  Supine Shoulder Abduction AAROM with Dowel - 1 x daily - 5-7 x weekly - 2-3 sets - 15-20 reps - 5-10 seconds hold  Supine Sciatic Nerve Glide - 1 x daily - 5-7 x weekly - 2-3 sets - 8-12 reps  Active Straight Leg Raise Advanced - 1 x daily - 3-4 x weekly - 2-3 sets - 8-15 reps  Seated Lumbar Flexion Stretch - 1-2 x daily - 5-7 x weekly - 1-2 sets - 10 reps - 5-10 hold        Therapeutic Activity:  0 minutes     Gait: 0 minutes    Neuromuscular Re-Education:  0 minutes      Canalith Repositioning Procedure:      Manual Therapy:  10 minutes   Manual traction   L hip inferior mobilization + passive stretching into hip flexion  L hip lateral mobilization + passive stretching into hip IR  Long axis distraction  PNF agonist reversal L hip flexion/extension  Lumbar lateral glides, hips to L w/overpressure from therapist      Modalities: 20 minutes HOLD mechanical traction at future visits  Mechanical lumbar traction, 15 minutes, Blue txn belts, 45 sec on/15 sec off, 56 kg pull on/28 kg pull off, pt LEs elevated on stool  Pt reported no increase in pain during activity. Felt stronger pull with blue belts. ASSESSMENT:    · Tolerated txn well. A little sore but no rebound pain today after txn  · Progressed supine HEP. Plan to advance standing and dynamic strength next visit. Goals:   Patient stated goal:  \"I would just like to be able to have less pain and more movement\"  []? Progressing: []? Met: []? Not Met: []? Adjusted     Therapist goals for Patient:   Short Term Goals: To be achieved in: 4 weeks  1. Independent in HEP and progression per patient tolerance, in order to prevent re-injury. []? Progressing: []? Met: []? Not Met: []? Adjusted  2. Patient's will have a decrease in pain to 4/10 on average to facilitate improvement in movement, function, and ADLs as indicated by Functional Deficits. []? Progressing: []? Met: []?  Not Met: []? Adjusted     Long Term Goals: To be achieved in: 8 weeks  1. Disability index score of 40% or less for the GISSELLE to assist with improving functional mobility and capacity. []? Progressing: []? Met: []? Not Met: []? Adjusted  2. Patient will demonstrate increased lumbar AROM to 100% WNL with no LE symptoms. []? Progressing: []? Met: []? Not Met: []? Adjusted  3. Patient will demonstrate an increase in NM recruitment/activation and overall GH and scapular strength to within n5lbs HHD or WNL for proper functional mobility as indicated by patients Functional Deficits. []? Progressing: []? Met: []? Not Met: []? Adjusted  4. Patient will complete a 20 minute strength training workout with minimal to no low back pain. []? Progressing: []? Met: []? Not Met: []? Adjusted    Overall Progression Towards Functional goals/ Treatment Progress Update:  [] Patient is progressing as expected towards functional goals listed. [] Progression is slowed due to complexities/Impairments listed. [] Progression has been slowed due to co-morbidities.   [x] Plan just implemented, too soon to assess goals progression <30days   [] Goals require adjustment due to lack of progress  [] Patient is not progressing as expected and requires additional follow up with physician  [] Other    Prognosis for POC: [x] Good [] Fair  [] Poor    Patient requires continued skilled intervention: [x] Yes  [] No    Treatment/Activity Tolerance:  [x] Patient able to complete treatment  [] Patient limited by fatigue  [] Patient limited by pain    [] Patient limited by other medical complications  [] Other:         PLAN: See eval  [x] Continue per plan of care [] Alter current plan (see comments above)  [] Plan of care initiated [] Hold pending MD visit [] Discharge        Therapeutic Exercise and NMR EXR  [x] (54169) Provided verbal/tactile cueing for activities related to strengthening, flexibility, endurance, ROM  for improvements in proximal strength and core control with self care, mobility, lifting and ambulation.  [] (10226) Provided verbal/tactile cueing for activities related to improving balance, coordination, kinesthetic sense, posture, motor skill, proprioception  to assist with core control in self care, mobility, lifting, and ambulation. Therapeutic Activities and Gait:    [] (66310 or 11141) Provided verbal/tactile cueing for activities related to improving balance, coordination, kinesthetic sense, posture, motor skill, proprioception and motor activation to allow for proper function  with self care and ADLs  [] (38269) Provided training and instruction to the patient for proper core and proximal hip recruitment and positioning with ambulation re-education     Home Exercise Program:    [x] (50311) Reviewed/Progressed HEP activities related to strengthening, flexibility, endurance, ROM of core, proximal hip and LE for functional self-care, mobility, lifting and ambulation   [] (13790) Reviewed/Progressed HEP activities related to improving balance, coordination, kinesthetic sense, posture, motor skill, proprioception of core, proximal hip and LE for self care, mobility, lifting, and ambulation      Manual Treatments:  PROM / STM / Oscillations-Mobs:  G-I, II, III, IV (PA's, Inf., Post.)  [x] (14606) Provided manual therapy to mobilize proximal hip and LS spine soft tissue/joints for the purpose of modulating pain, promoting relaxation,  increasing ROM, reducing/eliminating soft tissue swelling/inflammation/restriction, improving soft tissue extensibility and allowing for proper ROM for normal function with self care, mobility, lifting and ambulation.      Modalities:       Charges:  Timed Code Treatment Minutes: 25   Total Treatment Minutes: 45     Medicare Cap total YTD:        [x]N/A  Workers Comp Time Stamp  (Per CPT and Total Treatment) [x]N/A   Time In:   Time Out:       [] EVAL  [x] LK(10940) x  1   [] IONTO  [] NMR (24507) x     [] VASO  [x] Manual (95036) x 1     [] Other:  [] TA x     [] Gait x   [x] Mech Traction (41552)  [] ES(attended) (16174)     [] ES (un) (40307):       Electronically signed by: Nikolas Gonzalez, PT, MPT, OMT-c [id number]        Note: If patient does not return for scheduled/ recommended follow up visits, this note will serve as a discharge from care along with most recent update on progress.

## 2021-07-02 ENCOUNTER — HOSPITAL ENCOUNTER (OUTPATIENT)
Dept: PHYSICAL THERAPY | Age: 41
Setting detail: THERAPIES SERIES
Discharge: HOME | End: 2021-07-02
Payer: OTHER GOVERNMENT

## 2021-07-02 PROCEDURE — 97140 MANUAL THERAPY 1/> REGIONS: CPT

## 2021-07-02 PROCEDURE — 97110 THERAPEUTIC EXERCISES: CPT

## 2021-07-02 PROCEDURE — 97012 MECHANICAL TRACTION THERAPY: CPT

## 2021-07-02 NOTE — FLOWSHEET NOTE
Physical Therapy Daily Treatment Note  Date:  2021    Patient Name:  Barry Stratton    :  1980  MRN: [id number]      Medical/Treatment Diagnosis Information:  · Diagnosis: Acute pain of left shoulder (M25.512), chronic bilateral low back pain with bilateral sciatica (M54.42)  · Treatment Diagnosis: low back pain with sciatica, L shoulder pain with mobility deficits    Insurance/Certification information:  PT Insurance Information: , pre-auth required, $21 co-pay    Physician Information:  Referring Practitioner: Anna Horne DO    Plan of care signed :  []  Yes  [x] No  []  Cosign [x]  Fax 21     Date of Patient follow up with Physician:     Is this a Progress Report:     []  Yes  [x]  No      If Yes:  Date Range for reporting period:  Beginning 2021  Ending    Progress report will be due (10 Rx or 30 days whichever is less):        Recertification will be due (POC Duration  / 90 days whichever is less): 12 visits      Visit # Insurance Allowable Auth Required     16 authorized [x]  Yes []  No        Functional Scale: GISSELLE      Date 21  Score: 27/50 = 54% disability    Functional Scale: SPADI      Date 21   Score: 57/130 = 44% disability      Latex Allergy:  [x]NO      []YES  Preferred Language for Healthcare:   [x]English       []other:    RESTRICTIONS/PRECAUTIONS: previous hx of laminectomy     SUBJECTIVE:    · Pt feels ok today  · No significant change in symptoms since last visit. · Feels a little more loose. Believes that traction is helping      Pain level:  3/10      Plan Moving Forward/ For next visit:    Centralize LE symptoms    Progress to standing trunk strengthening activities as tolerated.  Omit mechanical txn        OBJECTIVE:    Lumbar side gliding: less ROM with hips to L     Exercises/Interventions:     Exercises in bold performed in department today.   Items not bolded are carried forward from prior visits for continuity of the record. Exercise/Equipment Resistance/Repetitions HEP Other comments   Sidelying sciatic nerve glide in R sidelying   2 min []    Supine sciatic nerve glide  1 min 15 sec [x]    Prone press-up   2 min; 1 min each with LE pointed directionally [x]    Bridging   2x20 [x]    Bridge with sustained abd  RTB 3x15     Bridge with sustained ADD (Ball) 2x20     Lumbar roll stretch   1 min in L sidelying [x] Reviewed for HEP   Lumbar lateral glides   3 x 20-25 reps hips to L []    Recumbent bike   3 min, RPE 4-6/10 []    Elliptical  2 min     SLR with contralateral arm extended  TA activation + supine marching   1 x 10 knees to chest, 2 x 10 holding 8# DB  2 x 12 []    Seated sciatic nerve glide  2 x 1 min [x]     Single knee to chest    5 x 15 sec hold bilaterally [] Using towel to pull knee towards chest   Lumbar rotation dynamic stretch    1 min 30 sec []    Side plank   6 reps each side [] HOLD until L shoulder pain improves   Seated lumbar flexion  1 min, 5 second holds []    Forward lunge  2 x 10, holding 9# in L hand [] L LE only   Goblet squat with slow eccentric   3 x 10, holding 10# weight []        []    Standing Hip Abduction  3x10   9#     Lateral step out/ squats with Glute squeeze 6 length of // bars  9#     Standing Hip Extension Taps    3x10  9# []    Mule Kick  3x10 9#     Prone extensor exercises  NV     Pelvic/ Hip Flexibility  stretches NV       Therapeutic Exercise/Home Exercise Program:   25 minutes  Pt inst in role of PT, prognosis, plan of care, use of CP/HP, activity modification, and benefits of therapy. HEP has been established, See above, pt given handout(s) of new exercises. Updated HEP given to pt today, reviewed with patient at end of session. See HEP exercises below. Provided RTB    Access Code: [email or web address]  URL: [email or web address]  Date: 06/21/2021  Prepared by: Michael Huerta    Exercises  Supine Bridge - 1 x daily - 3-4 x weekly - 2-3 sets - 8-12 reps  Left Standing Lateral Shift Correction at Wall - Repetitions - 2-3 x daily - 5-7 x weekly - 1 sets - 20 reps  Supine Shoulder Flexion Extension AAROM with Dowel - 1 x daily - 5-7 x weekly - 2 sets - 10-15 reps - 5-10 seconds hold  Supine Shoulder Abduction AAROM with Dowel - 1 x daily - 5-7 x weekly - 2-3 sets - 15-20 reps - 5-10 seconds hold  Supine Sciatic Nerve Glide - 1 x daily - 5-7 x weekly - 2-3 sets - 8-12 reps  Active Straight Leg Raise Advanced - 1 x daily - 3-4 x weekly - 2-3 sets - 8-15 reps  Seated Lumbar Flexion Stretch - 1-2 x daily - 5-7 x weekly - 1-2 sets - 10 reps - 5-10 hold        Therapeutic Activity:  0 minutes     Gait: 0 minutes    Neuromuscular Re-Education:  0 minutes      Canalith Repositioning Procedure:      Manual Therapy:  10 minutes   Manual traction   L hip inferior mobilization + passive stretching into hip flexion  L hip lateral mobilization + passive stretching into hip IR  PNF agonist reversal L hip flexion/extension  Lumbar lateral glides, hips to L w/overpressure from therapist  MET to promote pelvic alignment/ stabilization in supine, sidelying. Long axis distraction       Modalities: 20 minutes  Cont txn due to improving pain ratings. May dc this next visit. Mechanical lumbar traction, 15 minutes, Blue txn belts, 45 sec on/15 sec off, 57 kg pull on/28 kg pull off, pt LEs elevated on stool  Pt reported no increase in pain during activity. Felt stronger pull with blue belts. ASSESSMENT:    · Pt progressing well with pain and mobility. Progressed strength this date to include weight resistance and increased focus on back extensors. Cont with txn due to improving pain ratings. Plan to initiate flexibility stretches next visit. Goals:   Patient stated goal:  \"I would just like to be able to have less pain and more movement\"  []? Progressing: []? Met: []? Not Met: []? Adjusted     Therapist goals for Patient:   Short Term Goals: To be achieved in: 4 weeks  1.  Independent in HEP and progression per patient tolerance, in order to prevent re-injury. []? Progressing: []? Met: []? Not Met: []? Adjusted  2. Patient's will have a decrease in pain to 4/10 on average to facilitate improvement in movement, function, and ADLs as indicated by Functional Deficits. []? Progressing: []? Met: []? Not Met: []? Adjusted     Long Term Goals: To be achieved in: 8 weeks  1. Disability index score of 40% or less for the GISSELLE to assist with improving functional mobility and capacity. []? Progressing: []? Met: []? Not Met: []? Adjusted  2. Patient will demonstrate increased lumbar AROM to 100% WNL with no LE symptoms. []? Progressing: []? Met: []? Not Met: []? Adjusted  3. Patient will demonstrate an increase in NM recruitment/activation and overall GH and scapular strength to within n5lbs HHD or WNL for proper functional mobility as indicated by patients Functional Deficits. []? Progressing: []? Met: []? Not Met: []? Adjusted  4. Patient will complete a 20 minute strength training workout with minimal to no low back pain. []? Progressing: []? Met: []? Not Met: []? Adjusted    Overall Progression Towards Functional goals/ Treatment Progress Update:  [] Patient is progressing as expected towards functional goals listed. [] Progression is slowed due to complexities/Impairments listed. [] Progression has been slowed due to co-morbidities.   [x] Plan just implemented, too soon to assess goals progression <30days   [] Goals require adjustment due to lack of progress  [] Patient is not progressing as expected and requires additional follow up with physician  [] Other    Prognosis for POC: [x] Good [] Fair  [] Poor    Patient requires continued skilled intervention: [x] Yes  [] No    Treatment/Activity Tolerance:  [x] Patient able to complete treatment  [] Patient limited by fatigue  [] Patient limited by pain    [] Patient limited by other medical complications  [] Other:         PLAN: See eval  [x] Continue per plan of care [] Alter current plan (see comments above)  [] Plan of care initiated [] Hold pending MD visit [] Discharge        Therapeutic Exercise and NMR EXR  [x] (04361) Provided verbal/tactile cueing for activities related to strengthening, flexibility, endurance, ROM  for improvements in proximal strength and core control with self care, mobility, lifting and ambulation.  [] (55377) Provided verbal/tactile cueing for activities related to improving balance, coordination, kinesthetic sense, posture, motor skill, proprioception  to assist with core control in self care, mobility, lifting, and ambulation.      Therapeutic Activities and Gait:    [] (84666 or 00774) Provided verbal/tactile cueing for activities related to improving balance, coordination, kinesthetic sense, posture, motor skill, proprioception and motor activation to allow for proper function  with self care and ADLs  [] (42131) Provided training and instruction to the patient for proper core and proximal hip recruitment and positioning with ambulation re-education     Home Exercise Program:    [x] (01802) Reviewed/Progressed HEP activities related to strengthening, flexibility, endurance, ROM of core, proximal hip and LE for functional self-care, mobility, lifting and ambulation   [] (42740) Reviewed/Progressed HEP activities related to improving balance, coordination, kinesthetic sense, posture, motor skill, proprioception of core, proximal hip and LE for self care, mobility, lifting, and ambulation      Manual Treatments:  PROM / STM / Oscillations-Mobs:  G-I, II, III, IV (PA's, Inf., Post.)  [x] (96310) Provided manual therapy to mobilize proximal hip and LS spine soft tissue/joints for the purpose of modulating pain, promoting relaxation,  increasing ROM, reducing/eliminating soft tissue swelling/inflammation/restriction, improving soft tissue extensibility and allowing for proper ROM for normal function with self care, mobility, lifting

## 2021-07-09 ENCOUNTER — HOSPITAL ENCOUNTER (OUTPATIENT)
Dept: PHYSICAL THERAPY | Age: 41
Setting detail: THERAPIES SERIES
Discharge: HOME | End: 2021-07-09
Payer: OTHER GOVERNMENT

## 2021-07-09 PROCEDURE — 97140 MANUAL THERAPY 1/> REGIONS: CPT

## 2021-07-09 PROCEDURE — 97110 THERAPEUTIC EXERCISES: CPT

## 2021-07-09 NOTE — FLOWSHEET NOTE
Physical Therapy Daily Treatment Note   And Progress Note     Patient was seen for 9 Visits of PT. Initial eval date 21. Pt is progressing well towards all Short Term and Long Term Goals of therapy. Has not met goals for strength, ROM and Indep with HEP  , however is making progress in these areas. GISSELLE score improved to 24/50. Will increase focus on shoulder while continuing strength and stabilization of pelvis. Plan to continue per POC, to continue progress toward stated goals. Please see attached treatment note for most recent status. Thank you for your referral of this patient.      Marilee Leon, PT , [place], QKR-      Date:  2021    Patient Name:  Kelsey Belcher    :  1980  MRN: [id number]      Medical/Treatment Diagnosis Information:  · Diagnosis: Acute pain of left shoulder (M25.512), chronic bilateral low back pain with bilateral sciatica (M54.42)  · Treatment Diagnosis: low back pain with sciatica, L shoulder pain with mobility deficits    Insurance/Certification information:  PT Insurance Information: Nish, pre-auth required, $21 co-pay    Physician Information:  Referring Practitioner: Timo Short DO    Plan of care signed :  []  Yes  [x] No  []  Cosign [x]  Fax 21     Date of Patient follow up with Physician:     Is this a Progress Report:     [x]  Yes  []  No      If Yes:  Date Range for reporting period:  Beginning 21   Ending      21     Progress report will be due (10 Rx or 30 days whichever is less):  85      Recertification will be due (POC Duration  / 90 days whichever is less): 12 visits      Visit # Insurance Allowable Auth Required     16 authorized [x]  Yes []  No        Functional Scale: GISSELLE      Date 21  Score: 27/50 = 54% disability  24/ 50 = 48%    Functional Scale: SPADI   NT this date   Date 21   Score: 57/130 = 44% disability      Latex Allergy:  [x]NO      []YES  Preferred Language for Healthcare:   [x]English       []other:    RESTRICTIONS/PRECAUTIONS: previous hx of laminectomy     SUBJECTIVE:    · Pt feels ok today . Rates pain a 4-5/10   · No significant change in symptoms since last visit. · Doesn't feel like the therapy has done too much to change his pain. It is about the same. · Unable to localize a specific region of pain in his back, just the low back region. · Left shoulder remains high. Can shoot up to a 10. Reaching or grabbing overhead or behind. Taking milk from the refridgerator. · NT into Left Calf and Right buttock. No change with the traction. Pain level:  4-5 /10    Plan Moving Forward/ For next visit:    Progress to standing trunk strengthening activities as tolerated.  Progress Left Shoulder PROM and Strength Ex   Omit mechanical txn        OBJECTIVE:   Reassess for PN:   Pain: 4-5/10  Peresthesia: Cont reports of NT into L LE and right glutes. No change in symptoms with traction/ therapy  Pelvis:     Alignment: Neutral noted this visit.     Strength:    Range Tested MMT    Left Right   Hip Flexion 4 4   Hip Extension  3+ 3+   Hip Adduction 4+ 4+   Hip Abduction 4- 4-   Knee Extension  4+ 4+   Knee Flexion 4+ 4+   DF 4+ 4+          Shoulder:     Shoulder Cluster for RTC injury:     Drop Arm Test: Negative     Empty Can Test: Negative     Painful Arc:  Pain at end range of flexion     Neers impingement: Positive     Resisted ER: weakness, pain   Strength:   Range Tested MMT / Resisted PROM AROM Comments   *denotes pain Left Right Left Right Left Right     Shoulder Shrug  C4                 Shoulder Flexion  4 4+  120*  155  140* 155   * painful   Shoulder Extension                 Shoulder Abduction  C5  4- 4+             Shoulder Adduction   4+  5             Shoulder IR  4 4+  20           Shoulder ER  4- 4+  70        L UE painful   Elbow Flexion  C6  4               Elbow Extension C7  4                   Exercises/Interventions:     Exercises in bold performed in department today. Items not bolded are carried forward from prior visits for continuity of the record. Exercise/Equipment Resistance/Repetitions HEP Other comments   Sidelying sciatic nerve glide in R sidelying   2 min []    Supine sciatic nerve glide  1 min 15 sec [x]    Prone press-up   2 min; 1 min each with LE pointed directionally [x]    Bridging   2x20 [x]    Bridge with sustained abd  RTB 3x15     Bridge with sustained ADD (Ball) 2x20     Lumbar roll stretch   1 min in L sidelying [x] Reviewed for HEP   Lumbar lateral glides   3 x 20-25 reps hips to L []    Recumbent bike   3 min, RPE 4-6/10 []    Elliptical  2 min     SLR with contralateral arm extended  TA activation + supine marching   1 x 10 knees to chest, 2 x 10 holding 8# DB  2 x 12 []    Seated sciatic nerve glide  2 x 1 min [x]     Single knee to chest    5 x 15 sec hold bilaterally [] Using towel to pull knee towards chest   Lumbar rotation dynamic stretch    1 min 30 sec []    Side plank   6 reps each side [] HOLD until L shoulder pain improves   Seated lumbar flexion  1 min, 5 second holds []    Forward lunge  2 x 10, holding 9# in L hand [] L LE only   Goblet squat with slow eccentric   3 x 10, holding 10# weight []        []    Standing Hip Abduction  3x10   9#     Lateral step out/ squats with Glute squeeze 6 length of // bars  9#     Standing Hip Extension Taps    3x10  9# []    Mule Kick  3x10 9#     Prone extensor exercises  NV     Pelvic/ Hip Flexibility  stretches NV           SHOULDER COMPLEX      AAROM in supine x10     Sleeper stretch x10 5 sec hold     Mid Row  2x15     Row Row  2x15                   Therapeutic Exercise/Home Exercise Program:   30minutes  Pt inst in role of PT, prognosis, plan of care, use of CP/HP, activity modification, and benefits of therapy. HEP has been established, See above, pt given handout(s) of new exercises. Updated HEP given to pt today, reviewed with patient at end of session. See HEP exercises below. Provided RTB    Access Code: [email or web address]  URL: [employer]/  Date: 06/21/2021  Prepared by: Ghislaine Schuler    Exercises  Supine Bridge - 1 x daily - 3-4 x weekly - 2-3 sets - 8-12 reps  Left Standing Lateral Shift Correction at Wall - Repetitions - 2-3 x daily - 5-7 x weekly - 1 sets - 20 reps  Supine Shoulder Flexion Extension AAROM with Dowel - 1 x daily - 5-7 x weekly - 2 sets - 10-15 reps - 5-10 seconds hold  Supine Shoulder Abduction AAROM with Dowel - 1 x daily - 5-7 x weekly - 2-3 sets - 15-20 reps - 5-10 seconds hold  Supine Sciatic Nerve Glide - 1 x daily - 5-7 x weekly - 2-3 sets - 8-12 reps  Active Straight Leg Raise Advanced - 1 x daily - 3-4 x weekly - 2-3 sets - 8-15 reps  Seated Lumbar Flexion Stretch - 1-2 x daily - 5-7 x weekly - 1-2 sets - 10 reps - 5-10 hold    Access Code: [email or web address]  URL: [email or web address]  Date: 07/09/2021  Prepared by: Ghislaine Schuler    Exercises  Standing Shoulder Row with Anchored Resistance - 1 x daily - 7 x weekly - 3 sets - 10 reps  Shoulder extension with resistance - Neutral - 1 x daily - 7 x weekly - 3 sets - 10 reps  Shoulder External Rotation and Scapular Retraction with Resistance - 1 x daily - 7 x weekly - 3 sets - 10 reps        Therapeutic Activity:  0 minutes     Gait: 0 minutes    Neuromuscular Re-Education:  0 minutes      Canalith Repositioning Procedure:      Manual Therapy:  10 minutes   Neutral Alignment Today. Focus of manual tx shifted to Left Shoulder   Joint mobs GR III, IV : posterior , inferior ;   PROM to shoulder flexion, Abd, IR, ER   Gentle manual distraction of shoulder with MFR unwinding.    Improved ROM at end of session      Manual traction   L hip inferior mobilization + passive stretching into hip flexion  L hip lateral mobilization + passive stretching into hip IR  PNF agonist reversal L hip flexion/extension  Lumbar lateral glides, hips to L w/overpressure from therapist  MET to promote pelvic alignment/ stabilization in supine, sidelying. Long axis distraction       Modalities: 2 minutes  DC Mechanical Txn. Progress to core and pelvic strength exercises. Mechanical lumbar traction, 15 minutes, Blue txn belts, 45 sec on/15 sec off, 57 kg pull on/28 kg pull off, pt LEs elevated on stool  Pt reported no increase in pain during activity. Felt stronger pull with blue belts. ASSESSMENT:    · See above        Goals:   Patient stated goal:  \"I would just like to be able to have less pain and more movement\"  [x]? Progressing: []? Met: []? Not Met: []? Adjusted     Therapist goals for Patient:   Short Term Goals: To be achieved in: 4 weeks  1. Independent in HEP and progression per patient tolerance, in order to prevent re-injury. [x]? Progressing: []? Met: []? Not Met: []? Adjusted  2. Patient's will have a decrease in pain to 4/10 on average to facilitate improvement in movement, function, and ADLs as indicated by Functional Deficits. [x]? Progressing: []? Met: []? Not Met: []? Adjusted     Long Term Goals: To be achieved in: 8 weeks  1. Disability index score of 40% or less for the GISSELLE to assist with improving functional mobility and capacity. [x]? Progressing: []? Met: []? Not Met: []? Adjusted  2. Patient will demonstrate increased lumbar AROM to 100% WNL with no LE symptoms. [x]? Progressing: []? Met: []? Not Met: []? Adjusted  3. Patient will demonstrate an increase in NM recruitment/activation and overall GH and scapular strength to 4+/5 or WNL for proper functional mobility as indicated by patients Functional Deficits. [x]? Progressing: []? Met: []? Not Met: []? Adjusted  4. Patient will complete a 20 minute strength training workout with minimal to no low back pain. []? Progressing: []? Met: []? Not Met: []? Adjusted    Overall Progression Towards Functional goals/ Treatment Progress Update:  [x] Patient is progressing as expected towards functional goals listed.     [] Progression is slowed due to complexities/Impairments listed. [] Progression has been slowed due to co-morbidities. [] Plan just implemented, too soon to assess goals progression <30days   [] Goals require adjustment due to lack of progress  [] Patient is not progressing as expected and requires additional follow up with physician  [] Other    Prognosis for POC: [x] Good [] Fair  [] Poor    Patient requires continued skilled intervention: [x] Yes  [] No    Treatment/Activity Tolerance:  [x] Patient able to complete treatment  [] Patient limited by fatigue  [] Patient limited by pain    [] Patient limited by other medical complications  [] Other:         PLAN: See eval  [x] Continue per plan of care [] Alter current plan (see comments above)  [] Plan of care initiated [] Hold pending MD visit [] Discharge        Therapeutic Exercise and NMR EXR  [x] (45084) Provided verbal/tactile cueing for activities related to strengthening, flexibility, endurance, ROM  for improvements in proximal strength and core control with self care, mobility, lifting and ambulation.  [] (03299) Provided verbal/tactile cueing for activities related to improving balance, coordination, kinesthetic sense, posture, motor skill, proprioception  to assist with core control in self care, mobility, lifting, and ambulation.      Therapeutic Activities and Gait:    [] (45639 or 65955) Provided verbal/tactile cueing for activities related to improving balance, coordination, kinesthetic sense, posture, motor skill, proprioception and motor activation to allow for proper function  with self care and ADLs  [] (18499) Provided training and instruction to the patient for proper core and proximal hip recruitment and positioning with ambulation re-education     Home Exercise Program:    [x] (10504) Reviewed/Progressed HEP activities related to strengthening, flexibility, endurance, ROM of core, proximal hip and LE for functional self-care, mobility, lifting and ambulation   [] (45257) Reviewed/Progressed HEP activities related to improving balance, coordination, kinesthetic sense, posture, motor skill, proprioception of core, proximal hip and LE for self care, mobility, lifting, and ambulation      Manual Treatments:  PROM / STM / Oscillations-Mobs:  G-I, II, III, IV (PA's, Inf., Post.)  [x] (93153) Provided manual therapy to mobilize proximal hip and LS spine soft tissue/joints for the purpose of modulating pain, promoting relaxation,  increasing ROM, reducing/eliminating soft tissue swelling/inflammation/restriction, improving soft tissue extensibility and allowing for proper ROM for normal function with self care, mobility, lifting and ambulation. Modalities:       Charges:  Timed Code Treatment Minutes: 40   Total Treatment Minutes: 40         [] EVAL  [x] QL(79895) x  2   [] IONTO  [] NMR (61362) x     [] VASO  [x] Manual (70390) x 1     [] Other:  [] TA x     [] Gait x   [] Mech Traction (59920)  [] ES(attended) (84553)     [] ES (un) (40097):       Electronically signed by: Meg Conteh, PT, MPT, OMT-c [id number]        Note: If patient does not return for scheduled/ recommended follow up visits, this note will serve as a discharge from care along with most recent update on progress.

## 2021-07-12 ENCOUNTER — HOSPITAL ENCOUNTER (OUTPATIENT)
Dept: PHYSICAL THERAPY | Age: 41
Setting detail: THERAPIES SERIES
Discharge: HOME | End: 2021-07-12
Payer: OTHER GOVERNMENT

## 2021-07-12 PROCEDURE — 97140 MANUAL THERAPY 1/> REGIONS: CPT

## 2021-07-12 PROCEDURE — 97110 THERAPEUTIC EXERCISES: CPT

## 2021-07-12 NOTE — FLOWSHEET NOTE
Physical Therapy Daily Treatment Note         Date:  2021    Patient Name:  Huang Raymond    :  1980  MRN: [id number]      Medical/Treatment Diagnosis Information:  · Diagnosis: Acute pain of left shoulder (M25.512), chronic bilateral low back pain with bilateral sciatica (M54.42)  · Treatment Diagnosis: low back pain with sciatica, L shoulder pain with mobility deficits    Insurance/Certification information:  PT Insurance Information: [employer]ish, pre-auth required, $21 co-pay    Physician Information:  Referring Practitioner: María Elena Gonzales DO    Plan of care signed :  []  Yes  [x] No  []  Cosign [x]  Fax 21     Date of Patient follow up with Physician:     Is this a Progress Report:     []  Yes  [x]  No      If Yes:  Date Range for reporting period:  Beginning 21   Ending         Progress report will be due (10 Rx or 30 days whichever is less):  68      Recertification will be due (POC Duration  / 90 days whichever is less): 12 visits      Visit # Insurance Allowable Auth Required   10 /12  16 authorized [x]  Yes []  No        Functional Scale: GISSELLE      Date 21  Score: 27/50 = 54% disability  24/ 50 = 48%    Functional Scale: SPADI   NT this date   Date 21   Score: 57/130 = 44% disability      Latex Allergy:  [x]NO      []YES  Preferred Language for Healthcare:   [x]English       []other:    RESTRICTIONS/PRECAUTIONS: previous hx of laminectomy     SUBJECTIVE:    · Pt reports that he continues to have the same amount of discomfort down both LEs  · Reports that the pain is worse in his back compared to his legs  · He reports that the shooting pain down his LEs is reproduced with bending forward to tie his shoes, turning/rotating to reach for items, and picking up items off of the ground  · Says that it takes him 2-3 minutes to get back to his baseline level of pain after aggravating movements/positions      Pain level:  4 /10 baseline, 7/10 at worst    Plan Moving Forward/ For next visit:    Progress to standing trunk strengthening activities as tolerated.  Progress Left Shoulder PROM and Strength Ex   Continue to assess response to repeated lumbar extension to help centralize lower back/LE symptoms           OBJECTIVE:       Exercises/Interventions:     Exercises in bold performed in department today. Items not bolded are carried forward from prior visits for continuity of the record.   Exercise/Equipment Resistance/Repetitions HEP Other comments   Sidelying sciatic nerve glide in R sidelying   2 min []    Supine sciatic nerve glide  1 min 15 sec [x]    Prone press-up   2 min; 1 min each with LE pointed directionally [x]    Bridging   2x20 [x]    Bridge with sustained abd  RTB 3x15     Bridge with sustained ADD (Ball) 2x20     Lumbar roll stretch   1 min in L sidelying [x] Reviewed for HEP   Lumbar lateral glides   3 x 20-25 reps hips to L []    Recumbent bike   3 min, RPE 4-6/10 []    Elliptical  2 min     SLR with contralateral arm extended  TA activation + supine marching   1 x 10 knees to chest, 2 x 10 holding 8# DB  2 x 12 []    Seated sciatic nerve glide  2 x 1 min [x]     Single knee to chest    5 x 15 sec hold bilaterally [] Using towel to pull knee towards chest   Lumbar rotation dynamic stretch    1 min 30 sec []    Side plank   6 reps each side [] HOLD until L shoulder pain improves   Standing lumbar flexion 1 min  Walking hands down legs, increased pain with activity   Seated lumbar flexion  2 min [] In pain-free ROM   Forward lunge  2 x 10, holding 9# in L hand [] L LE only   Goblet squat with slow eccentric   3 x 10, holding 10# weight []    Figure 4 stretch with lumbar rotation  2 min [] Symptoms down L LE after 1 min 30 sec   Standing Hip Abduction  3x10   9#     Lateral step out/ squats with Glute squeeze 6 length of // bars  9#     Standing Hip Extension Taps    3x10  9# []    Mule Kick  3x10 9#     Prone extensor exercises  NV     Pelvic/ Hip both directions. Decreased ROM with rotation to R, mild L lower back and glute pain with rotation to R      Manual traction   L hip inferior mobilization + passive stretching into hip flexion  L hip lateral mobilization + passive stretching into hip IR  PNF agonist reversal L hip flexion/extension  Lumbar lateral glides, hips to L w/overpressure from therapist  MET to promote pelvic alignment/ stabilization in supine, sidelying. Long axis distraction       Modalities: 0 minutes  DC Mechanical Txn. Progress to core and pelvic strength exercises. Mechanical lumbar traction, 15 minutes, Blue txn belts, 45 sec on/15 sec off, 57 kg pull on/28 kg pull off, pt LEs elevated on stool  Pt reported no increase in pain during activity. Felt stronger pull with blue belts. ASSESSMENT:    · Pt was able to tolerate repeated lumbar flexion in limited range in sitting but not as well tolerated in standing. · He continues to have a low threshold for aggravating his lower back and LE symptoms. He will need to improve his capacity for exercise before having an increase in his symptoms  · Will have pt attempt repeated lumbar extension - will assess to see how he responds at his next visit. · Work on addressing L shoulder pain at next visit        Goals:   Patient stated goal:  \"I would just like to be able to have less pain and more movement\"  [x]? Progressing: []? Met: []? Not Met: []? Adjusted     Therapist goals for Patient:   Short Term Goals: To be achieved in: 4 weeks  1. Independent in HEP and progression per patient tolerance, in order to prevent re-injury. [x]? Progressing: []? Met: []? Not Met: []? Adjusted  2. Patient's will have a decrease in pain to 4/10 on average to facilitate improvement in movement, function, and ADLs as indicated by Functional Deficits. [x]? Progressing: []? Met: []? Not Met: []? Adjusted     Long Term Goals: To be achieved in: 8 weeks  1.  Disability index score of 40% or less for the GISSELLE to assist with improving functional mobility and capacity. [x]? Progressing: []? Met: []? Not Met: []? Adjusted  2. Patient will demonstrate increased lumbar AROM to 100% WNL with no LE symptoms. [x]? Progressing: []? Met: []? Not Met: []? Adjusted  3. Patient will demonstrate an increase in NM recruitment/activation and overall GH and scapular strength to 4+/5 or WNL for proper functional mobility as indicated by patients Functional Deficits. [x]? Progressing: []? Met: []? Not Met: []? Adjusted  4. Patient will complete a 20 minute strength training workout with minimal to no low back pain. []? Progressing: []? Met: []? Not Met: []? Adjusted    Overall Progression Towards Functional goals/ Treatment Progress Update:  [x] Patient is progressing as expected towards functional goals listed. [] Progression is slowed due to complexities/Impairments listed. [] Progression has been slowed due to co-morbidities.   [] Plan just implemented, too soon to assess goals progression <30days   [] Goals require adjustment due to lack of progress  [] Patient is not progressing as expected and requires additional follow up with physician  [] Other    Prognosis for POC: [x] Good [] Fair  [] Poor    Patient requires continued skilled intervention: [x] Yes  [] No    Treatment/Activity Tolerance:  [x] Patient able to complete treatment  [] Patient limited by fatigue  [] Patient limited by pain    [] Patient limited by other medical complications  [] Other:         PLAN: See eval  [x] Continue per plan of care [] Alter current plan (see comments above)  [] Plan of care initiated [] Hold pending MD visit [] Discharge        Therapeutic Exercise and NMR EXR  [x] (68707) Provided verbal/tactile cueing for activities related to strengthening, flexibility, endurance, ROM  for improvements in proximal strength and core control with self care, mobility, lifting and ambulation.  [] (86776) Provided verbal/tactile cueing for activities related to improving balance, coordination, kinesthetic sense, posture, motor skill, proprioception  to assist with core control in self care, mobility, lifting, and ambulation. Therapeutic Activities and Gait:    [] (34423 or 60520) Provided verbal/tactile cueing for activities related to improving balance, coordination, kinesthetic sense, posture, motor skill, proprioception and motor activation to allow for proper function  with self care and ADLs  [] (32936) Provided training and instruction to the patient for proper core and proximal hip recruitment and positioning with ambulation re-education     Home Exercise Program:    [x] (34252) Reviewed/Progressed HEP activities related to strengthening, flexibility, endurance, ROM of core, proximal hip and LE for functional self-care, mobility, lifting and ambulation   [] (79192) Reviewed/Progressed HEP activities related to improving balance, coordination, kinesthetic sense, posture, motor skill, proprioception of core, proximal hip and LE for self care, mobility, lifting, and ambulation      Manual Treatments:  PROM / STM / Oscillations-Mobs:  G-I, II, III, IV (PA's, Inf., Post.)  [x] (42231) Provided manual therapy to mobilize proximal hip and LS spine soft tissue/joints for the purpose of modulating pain, promoting relaxation,  increasing ROM, reducing/eliminating soft tissue swelling/inflammation/restriction, improving soft tissue extensibility and allowing for proper ROM for normal function with self care, mobility, lifting and ambulation.      Modalities:       Charges:  Timed Code Treatment Minutes: 45   Total Treatment Minutes: 45         [] EVAL  [x] DN(93648) x  2   [] IONTO  [] NMR (29812) x     [] VASO  [x] Manual (96703) x 1     [] Other:  [] TA x     [] Gait x   [] Mech Traction (86098)  [] ES(attended) (16738)     [] ES (un) (25779):       Electronically signed by: Jason Singletary PT, DPT, Maynor Cervantes #[id number]        Note: If patient does not return

## 2021-07-16 ENCOUNTER — HOSPITAL ENCOUNTER (OUTPATIENT)
Dept: PHYSICAL THERAPY | Age: 41
Setting detail: THERAPIES SERIES
Discharge: HOME | End: 2021-07-16
Payer: OTHER GOVERNMENT

## 2021-07-16 PROCEDURE — 97110 THERAPEUTIC EXERCISES: CPT

## 2021-07-16 NOTE — FLOWSHEET NOTE
Physical Therapy Daily Treatment Note         Date:  2021    Patient Name:  Salena Garcia    :  1980  MRN: [id number]      Medical/Treatment Diagnosis Information:  · Diagnosis: Acute pain of left shoulder (M25.512), chronic bilateral low back pain with bilateral sciatica (M54.42)  · Treatment Diagnosis: low back pain with sciatica, L shoulder pain with mobility deficits    Insurance/Certification information:  PT Insurance Information: , pre-auth required, $21 co-pay    Physician Information:  Referring Practitioner: Ibrahima Stapleton DO    Plan of care signed :  []  Yes  [x] No  []  Cosign [x]  Fax 21     Date of Patient follow up with Physician:     Is this a Progress Report:     []  Yes  [x]  No      If Yes:  Date Range for reporting period:  Beginning 21   Ending         Progress report will be due (10 Rx or 30 days whichever is less):  45      Recertification will be due (POC Duration  / 90 days whichever is less): 12 visits      Visit # Insurance Allowable Auth Required     16 authorized [x]  Yes []  No        Functional Scale: GISSELLE      Date 21  Score: 27/50 = 54% disability  24/ 50 = 48%    Functional Scale: SPADI   NT this date   Date 21   Score: 57/130 = 44% disability      Latex Allergy:  [x]NO      []YES  Preferred Language for Healthcare:   [x]English       []other:    RESTRICTIONS/PRECAUTIONS: previous hx of laminectomy     SUBJECTIVE:    · Reports that his lower back and LE symptoms did not change with new HEP  · Says that he has been sleeping at least 8 hours a night  · Slightly improved mobility in his L shoulder    * sign: picking up weighted items, horizontal abduction, abduction with arm at side, reaching overhead, especially with weighted items    Pain level:  3 /10 in L shoulder    Plan Moving Forward/ For next visit:    Progress to standing trunk strengthening activities as tolerated.    Progress Left Shoulder PROM and Strength Ex   Continue to assess response to repeated lumbar extension to help centralize lower back/LE symptoms   Reassessment note NV       OBJECTIVE:       Exercises/Interventions:     Exercises in bold performed in department today. Items not bolded are carried forward from prior visits for continuity of the record.   Exercise/Equipment Resistance/Repetitions HEP Other comments   Sidelying sciatic nerve glide in R sidelying   2 min []    Supine sciatic nerve glide  1 min 15 sec [x]    Prone press-up   2 min; 1 min each with LE pointed directionally [x]    Bridging   2x20 [x]    Bridge with sustained abd  RTB 3x15     Bridge with sustained ADD (Ball) 2x20     Lumbar roll stretch   1 min in L sidelying [x] Reviewed for HEP   Lumbar lateral glides   3 x 20-25 reps hips to L []    Recumbent bike   3 min, RPE 4-6/10 []    Elliptical  2 min     SLR with contralateral arm extended  TA activation + supine marching   1 x 10 knees to chest, 2 x 10 holding 8# DB  2 x 12 []    Seated sciatic nerve glide  2 x 1 min [x]     Single knee to chest    5 x 15 sec hold bilaterally [] Using towel to pull knee towards chest   Lumbar rotation dynamic stretch    1 min 30 sec []    Side plank   6 reps each side [] HOLD until L shoulder pain improves   Standing lumbar flexion 1 min  Walking hands down legs, increased pain with activity   Seated lumbar flexion  2 min [] In pain-free ROM   Forward lunge  2 x 10, holding 9# in L hand [] L LE only   Goblet squat with slow eccentric   3 x 10, holding 10# weight []    Figure 4 stretch with lumbar rotation  2 min [] Symptoms down L LE after 1 min 30 sec   Standing Hip Abduction  3x10   9#     Lateral step out/ squats with Glute squeeze 6 length of // bars  9#     Standing Hip Extension Taps    3x10  9# []    Mule Kick  3x10 9#     Prone extensor exercises  NV     Pelvic/ Hip Flexibility  stretches NV     Standing lumbar extension 10 reps, 10 reps with hands on wall  Pt complaints of tightness/reduced ROM in lower back   SHOULDER COMPLEX      AAROM in supine x10     Sleeper stretch x10 5 sec hold     Mid Row  25# - 2 x 10   2x15  Using cable machine   Row Row  2x15     Pulley 1 min flexion, 1 min scaption     Shoulder abduction isometric 3 x 1 min     Bent over row 2 x 10 reps  L UE only, R UE fixed on table   Sidelying shoulder abduction AMRAP with 2# - 15 reps, 5 RIR      Landmine press  5# on stick - 2 x 10-12 reps, last set 20 reps (RIR 3 )     Shoulder horizontal abduction 2.5# - 2x8, 5# - 8 reps  Using cable machine     Therapeutic Exercise/Home Exercise Program:   40 minutes  Pt inst in role of PT, prognosis, plan of care, use of CP/HP, activity modification, and benefits of therapy. HEP has been established, See above, pt given handout(s) of new exercises. Updated HEP given to pt today, reviewed with patient at end of session. See HEP exercises below. Provided RTB    Access Code: [email or web address]  URL: [email or web address]  Date: 07/12/2021  Prepared by: Corrinne Milo    Exercises  Seated Lumbar Flexion Stretch - 1-2 x daily - 5-7 x weekly - 1-2 sets - 10 reps - 5-10 hold  Standing Back Extension at Wall - 1 x daily - 5-7 x weekly - 2-3 sets - 10-15 reps - 3-5 seconds hold  Supine Figure 4 Piriformis Stretch - 1 x daily - 5-7 x weekly - 2-4 sets - 5-8 reps - 5-10 seconds hold      Access Code: [email or web address]  URL: [email or web address]  Date: 07/09/2021  Prepared by: Corrinne Milmeaghan    Exercises  Standing Shoulder Row with Anchored Resistance - 1 x daily - 7 x weekly - 3 sets - 10 reps  Shoulder extension with resistance - Neutral - 1 x daily - 7 x weekly - 3 sets - 10 reps  Shoulder External Rotation and Scapular Retraction with Resistance - 1 x daily - 7 x weekly - 3 sets - 10 reps        Therapeutic Activity:  0 minutes     Gait: 0 minutes    Neuromuscular Re-Education:  0 minutes      Canalith Repositioning Procedure:      Manual Therapy:  0 minutes   Neutral Alignment Today.   Focus of manual tx shifted to Left Shoulder   Joint mobs GR III, IV : posterior , inferior ;   PROM to shoulder flexion, Abd, IR, ER   Gentle manual distraction of shoulder with MFR unwinding. Improved ROM at end of session    Lumbar rotation with overpressure - both directions. Decreased ROM with rotation to R, mild L lower back and glute pain with rotation to R      Manual traction   L hip inferior mobilization + passive stretching into hip flexion  L hip lateral mobilization + passive stretching into hip IR  PNF agonist reversal L hip flexion/extension  Lumbar lateral glides, hips to L w/overpressure from therapist  MET to promote pelvic alignment/ stabilization in supine, sidelying. Long axis distraction       Modalities: 0 minutes  DC Mechanical Txn. Progress to core and pelvic strength exercises. Mechanical lumbar traction, 15 minutes, Blue txn belts, 45 sec on/15 sec off, 57 kg pull on/28 kg pull off, pt LEs elevated on stool  Pt reported no increase in pain during activity. Felt stronger pull with blue belts. ASSESSMENT:    · PRE's that addressed pt's painful movements, pt was able to perform activities with minimal to no increase in pain. Pt's HEP was updated to better address these painful movement so that he can improve his functional mobility. ·         Goals:   Patient stated goal:  \"I would just like to be able to have less pain and more movement\"  [x]? Progressing: []? Met: []? Not Met: []? Adjusted     Therapist goals for Patient:   Short Term Goals: To be achieved in: 4 weeks  1. Independent in HEP and progression per patient tolerance, in order to prevent re-injury. [x]? Progressing: []? Met: []? Not Met: []? Adjusted  2. Patient's will have a decrease in pain to 4/10 on average to facilitate improvement in movement, function, and ADLs as indicated by Functional Deficits. [x]? Progressing: []? Met: []? Not Met: []? Adjusted     Long Term Goals: To be achieved in: 8 weeks  1.  Disability index score of 40% or less for the GISSELLE to assist with improving functional mobility and capacity. [x]? Progressing: []? Met: []? Not Met: []? Adjusted  2. Patient will demonstrate increased lumbar AROM to 100% WNL with no LE symptoms. [x]? Progressing: []? Met: []? Not Met: []? Adjusted  3. Patient will demonstrate an increase in NM recruitment/activation and overall GH and scapular strength to 4+/5 or WNL for proper functional mobility as indicated by patients Functional Deficits. [x]? Progressing: []? Met: []? Not Met: []? Adjusted  4. Patient will complete a 20 minute strength training workout with minimal to no low back pain. []? Progressing: []? Met: []? Not Met: []? Adjusted    Overall Progression Towards Functional goals/ Treatment Progress Update:  [x] Patient is progressing as expected towards functional goals listed. [] Progression is slowed due to complexities/Impairments listed. [] Progression has been slowed due to co-morbidities.   [] Plan just implemented, too soon to assess goals progression <30days   [] Goals require adjustment due to lack of progress  [] Patient is not progressing as expected and requires additional follow up with physician  [] Other    Prognosis for POC: [x] Good [] Fair  [] Poor    Patient requires continued skilled intervention: [x] Yes  [] No    Treatment/Activity Tolerance:  [x] Patient able to complete treatment  [] Patient limited by fatigue  [] Patient limited by pain    [] Patient limited by other medical complications  [] Other:         PLAN: See eval  [x] Continue per plan of care [] Alter current plan (see comments above)  [] Plan of care initiated [] Hold pending MD visit [] Discharge        Therapeutic Exercise and NMR EXR  [x] (92432) Provided verbal/tactile cueing for activities related to strengthening, flexibility, endurance, ROM  for improvements in proximal strength and core control with self care, mobility, lifting and ambulation.  [] (77499) Provided verbal/tactile cueing for activities related to improving balance, coordination, kinesthetic sense, posture, motor skill, proprioception  to assist with core control in self care, mobility, lifting, and ambulation. Therapeutic Activities and Gait:    [] (33543 or 27297) Provided verbal/tactile cueing for activities related to improving balance, coordination, kinesthetic sense, posture, motor skill, proprioception and motor activation to allow for proper function  with self care and ADLs  [] (07637) Provided training and instruction to the patient for proper core and proximal hip recruitment and positioning with ambulation re-education     Home Exercise Program:    [x] (80753) Reviewed/Progressed HEP activities related to strengthening, flexibility, endurance, ROM of core, proximal hip and LE for functional self-care, mobility, lifting and ambulation   [] (95652) Reviewed/Progressed HEP activities related to improving balance, coordination, kinesthetic sense, posture, motor skill, proprioception of core, proximal hip and LE for self care, mobility, lifting, and ambulation      Manual Treatments:  PROM / STM / Oscillations-Mobs:  G-I, II, III, IV (PA's, Inf., Post.)  [x] (80612) Provided manual therapy to mobilize proximal hip and LS spine soft tissue/joints for the purpose of modulating pain, promoting relaxation,  increasing ROM, reducing/eliminating soft tissue swelling/inflammation/restriction, improving soft tissue extensibility and allowing for proper ROM for normal function with self care, mobility, lifting and ambulation.      Modalities:       Charges:  Timed Code Treatment Minutes: 40   Total Treatment Minutes: 40         [] EVAL  [x] SX(51574) x  3  [] IONTO  [] NMR (48395) x     [] VASO  [] Manual (93496) x      [] Other:  [] TA x     [] Gait x   [] Mech Traction (18176)  [] ES(attended) (16923)     [] ES (un) (09093):       Electronically signed by: Bird Azul PT, DPT, Hailey Calvillo #[id number]        Note: If

## 2021-07-19 ENCOUNTER — HOSPITAL ENCOUNTER (OUTPATIENT)
Dept: PHYSICAL THERAPY | Age: 41
Setting detail: THERAPIES SERIES
Discharge: HOME | End: 2021-07-19
Payer: OTHER GOVERNMENT

## 2021-07-19 PROCEDURE — 97110 THERAPEUTIC EXERCISES: CPT

## 2021-07-19 PROCEDURE — 97140 MANUAL THERAPY 1/> REGIONS: CPT

## 2021-07-19 NOTE — PROGRESS NOTES
Physical Therapy Daily Treatment Note/Recert Note     Patient was seen for 12 Visits of PT. Initial eval date 21. Pt progressing fair meeting 1 Short Term Goal and progressing toward Long Term Goals of therapy. Plan to see patient for 8 additional visits to further address his ongoing lower back pain and L shoulder pain. Thank you for your referral of this patient.      Sylvia Russ PT, DPT, Odin Milian #[id number]          Date:  2021    Patient Name:  Danisha Fragoso    :  1980  MRN: [id number]      Medical/Treatment Diagnosis Information:  · Diagnosis: Acute pain of left shoulder (M25.512), chronic bilateral low back pain with bilateral sciatica (M54.42)  · Treatment Diagnosis: low back pain with sciatica, L shoulder pain with mobility deficits    Insurance/Certification information:  PT Insurance Information: Nish, pre-auth required, $21 co-pay    Physician Information:  Referring Practitioner: Polly tSevens DO    Plan of care signed :  []  Yes  [x] No  []  Cosign [x]  Fax 21     Date of Patient follow up with Physician:     Is this a Progress Report:     []  Yes  [x]  No      If Yes:  Date Range for reporting period:  Beginning 21   Ending         Progress report will be due (10 Rx or 30 days whichever is less):        Recertification will be due (POC Duration  / 90 days whichever is less): 12 visits      Visit # Insurance Allowable Auth Required     16 authorized [x]  Yes []  No        Functional Scale: GISSELLE      Date 21  Score: 27/50 = 54% disability  24/ 50 = 48%  26/50 = 52%    Functional Scale: SPADI   21   Date 21      Score: 57/130 = 56% disability 67/130 = 48% disability      Latex Allergy:  [x]NO      []YES  Preferred Language for Healthcare:   [x]English       []other:    RESTRICTIONS/PRECAUTIONS: previous hx of laminectomy     SUBJECTIVE:    · \"I feel like I've gotten a little more movement back, but the pain is about the same. \"  · Pt reports that his pain continues to be aggravated with bending forward, says that he can barely bend in either direction without pain. * sign for shoulder: picking up weighted items, horizontal abduction, abduction with arm at side, reaching overhead, especially with weighted items    *sign for lower back: He reports that the shooting pain down his LEs is reproduced with bending forward to tie his shoes, turning/rotating to reach for items, and picking up items off of the ground    Pain level:  4 /10 in lower back    Plan Moving Forward/ For next visit:    Progress to standing trunk strengthening activities as tolerated.  Progress Left Shoulder PROM and Strength Ex   Continue to assess response to repeated lumbar extension to help centralize lower back/LE symptoms       OBJECTIVE:   Pain at 50% AROM R lumbar sidebending  Symptoms increased at 40% AROM of lumbar flexion  Symptoms increased at 25% AROM of lumbar extension    Exercises/Interventions:     Exercises in bold performed in department today. Items not bolded are carried forward from prior visits for continuity of the record.   Exercise/Equipment Resistance/Repetitions HEP Other comments   Sidelying sciatic nerve glide in R sidelying   2 min []    Supine sciatic nerve glide  1 min 15 sec [x]    Prone press-up   2 min; 1 min each with LE pointed directionally [x]    Bridging with trunk rotation 3 x 8 reps  2x20 [x] UEs extended, holding ball    Standing hip hinge 3 x 10 reps     Bridge with sustained abd  RTB 3x15     Bridge with sustained ADD (Ball) 2x20     Lumbar roll stretch   1 min in L sidelying [x] Reviewed for HEP   Lumbar lateral glides   3 x 20-25 reps hips to L []    Recumbent bike   3 min, RPE 4-6/10 []    Elliptical  2 min     SLR with contralateral arm extended  TA activation + supine marching   1 x 10 knees to chest, 2 x 10 holding 8# DB  2 x 12 []    Seated sciatic nerve glide  2 x 1 min [x]     Single knee to chest    5 x 15 sec hold bilaterally [] Using towel to pull knee towards chest   Lumbar rotation dynamic stretch    1 min 30 sec []    Side plank   6 reps each side [] HOLD until L shoulder pain improves   Standing lumbar flexion 1 min  Walking hands down legs, increased pain with activity   Seated lumbar flexion  2 min [] In pain-free ROM   Forward lunge  2 x 10, holding 9# in L hand [] L LE only   Goblet squat with slow eccentric   3 x 10, holding 10# weight []    Figure 4 stretch with lumbar rotation  2 min [] Symptoms down L LE after 1 min 30 sec   Standing Hip Abduction  3x10   9#     Lateral step out/ squats with Glute squeeze 6 length of // bars  9#     Standing Hip Extension Taps    3x10  9# []    Mule Kick  3x10 9#     Prone trunk extension 2 x 12  In partial ROM, with assistance from hands   Pelvic/ Hip Flexibility  stretches NV     Standing lumbar extension 10 reps, 10 reps with hands on wall  Pt complaints of tightness/reduced ROM in lower back   SHOULDER COMPLEX      AAROM in supine x10     Sleeper stretch x10 5 sec hold     Mid Row  25# - 2 x 10   2x15  Using cable machine   Row Row  2x15     Pulley 1 min flexion, 1 min scaption     Shoulder abduction isometric 3 x 1 min     Bent over row 2 x 10 reps  L UE only, R UE fixed on table   Sidelying shoulder abduction AMRAP with 2# - 15 reps, 5 RIR      Landmine press  5# on stick - 2 x 10-12 reps, last set 20 reps (RIR 3 )     Shoulder horizontal abduction 2.5# - 2x8, 5# - 8 reps  Using cable machine     Therapeutic Exercise/Home Exercise Program:   28 minutes  Pt inst in role of PT, prognosis, plan of care, use of CP/HP, activity modification, and benefits of therapy. HEP has been established, See above, pt given handout(s) of new exercises. Updated HEP given to pt today, reviewed with patient at end of session. See HEP exercises below. Provided RTB    Access Code: [email or web address]  URL: [email or web address]  Date: 07/19/2021  Prepared by: Elmer Ortiz Deanna    Exercises  Seated Lumbar Flexion Stretch - 1-2 x daily - 5-7 x weekly - 1-2 sets - 10 reps - 5-10 hold  Supine Figure 4 Piriformis Stretch - 1 x daily - 5-7 x weekly - 2-4 sets - 5-8 reps - 5-10 seconds hold  Upper Back Extension Off Table - 1 x daily - 4-5 x weekly - 2-3 sets - 8-12 reps - 2 seconds hold  Standing Hip Hinge with Dowel - 1 x daily - 4-5 x weekly - 2-3 sets - 8-12 reps        Access Code: [email or web address]  URL: [email or web address]  Date: 07/09/2021  Prepared by: Mikki Andrews    Exercises  Standing Shoulder Row with Anchored Resistance - 1 x daily - 7 x weekly - 3 sets - 10 reps  Shoulder extension with resistance - Neutral - 1 x daily - 7 x weekly - 3 sets - 10 reps  Shoulder External Rotation and Scapular Retraction with Resistance - 1 x daily - 7 x weekly - 3 sets - 10 reps        Therapeutic Activity:  0 minutes     Gait: 0 minutes    Neuromuscular Re-Education:  0 minutes      Canalith Repositioning Procedure:      Manual Therapy:  12 minutes   Neutral Alignment Today. Focus of manual tx shifted to Left Shoulder   Joint mobs GR III, IV : posterior , inferior ;   PROM to shoulder flexion, Abd, IR, ER   Gentle manual distraction of shoulder with MFR unwinding. Improved ROM at end of session    Lumbar rotation with overpressure - both directions. Decreased ROM with rotation to R, mild L lower back and glute pain with rotation to R  Unilateral PA mobs to lumbar spine - L1-5 bilaterally      Manual traction   L hip inferior mobilization + passive stretching into hip flexion  L hip lateral mobilization + passive stretching into hip IR  PNF agonist reversal L hip flexion/extension  Lumbar lateral glides, hips to L w/overpressure from therapist  MET to promote pelvic alignment/ stabilization in supine, sidelying. Long axis distraction       Modalities: 0 minutes  DC Mechanical Txn. Progress to core and pelvic strength exercises.   Mechanical lumbar traction, 15 minutes, Blue txn belts, 45 sec on/15 sec off, 57 kg pull on/28 kg pull off, pt LEs elevated on stool  Pt reported no increase in pain during activity. Felt stronger pull with blue belts. ASSESSMENT:    Pt has made minimal improvement in his lower back and LE pain, but has made moderate improvement in his functional ability to perform different movements. He is responding well to small, repeated motions into trunk flexion/extension within his pain tolerance. He will require further skilled PT to reach his long term goal of being able to move through his full functional AROM without pain. ·         Goals:   Patient stated goal:  \"I would just like to be able to have less pain and more movement\" - 40% progress as of 07/19/21  [x]? Progressing: []? Met: []? Not Met: []? Adjusted     Therapist goals for Patient:   Short Term Goals: To be achieved in: 4 weeks  1. Independent in HEP and progression per patient tolerance, in order to prevent re-injury. [x]? Progressing: [x]? Met: []? Not Met: []? Adjusted  2. Patient's will have a decrease in pain to 4/10 on average to facilitate improvement in movement, function, and ADLs as indicated by Functional Deficits. []? Progressing: [x]? Met: []? Not Met: []? Adjusted     Long Term Goals: To be achieved in: 8 weeks  1. Disability index score of 40% or less for the GISSELLE to assist with improving functional mobility and capacity. [x]? Progressing: []? Met: []? Not Met: []? Adjusted  2. Patient will demonstrate increased lumbar AROM to 100% WNL with no LE symptoms. [x]? Progressing: []? Met: []? Not Met: []? Adjusted  3. Patient will demonstrate an increase in NM recruitment/activation and overall GH and scapular strength to 4+/5 or WNL for proper functional mobility as indicated by patients Functional Deficits. [x]? Progressing: []? Met: []? Not Met: []? Adjusted  4. Patient will complete a 20 minute strength training workout with minimal to no low back pain.  0% progress as of re-education     Home Exercise Program:    [x] (15531) Reviewed/Progressed HEP activities related to strengthening, flexibility, endurance, ROM of core, proximal hip and LE for functional self-care, mobility, lifting and ambulation   [] (20742) Reviewed/Progressed HEP activities related to improving balance, coordination, kinesthetic sense, posture, motor skill, proprioception of core, proximal hip and LE for self care, mobility, lifting, and ambulation      Manual Treatments:  PROM / STM / Oscillations-Mobs:  G-I, II, III, IV (PA's, Inf., Post.)  [x] (53704) Provided manual therapy to mobilize proximal hip and LS spine soft tissue/joints for the purpose of modulating pain, promoting relaxation,  increasing ROM, reducing/eliminating soft tissue swelling/inflammation/restriction, improving soft tissue extensibility and allowing for proper ROM for normal function with self care, mobility, lifting and ambulation. Modalities:       Charges:  Timed Code Treatment Minutes: 40   Total Treatment Minutes: 40         [] EVAL  [x] UL(14391) x  2  [] IONTO  [] NMR (57496) x     [] VASO  [x] Manual (83991) x 1     [] Other:  [] TA x     [] Gait x   [] [facility] Traction (70405)  [] ES(attended) (25556)     [] ES (un) (47038):       Electronically signed by: Marquis Kent PT, DPT, Kacy Motta #[id number]        Note: If patient does not return for scheduled/ recommended follow up visits, this note will serve as a discharge from care along with most recent update on progress.

## 2021-07-23 ENCOUNTER — HOSPITAL ENCOUNTER (OUTPATIENT)
Dept: PHYSICAL THERAPY | Age: 41
Setting detail: THERAPIES SERIES
Discharge: HOME | End: 2021-07-23
Payer: OTHER GOVERNMENT

## 2021-07-23 PROCEDURE — 97110 THERAPEUTIC EXERCISES: CPT

## 2021-07-23 PROCEDURE — 97140 MANUAL THERAPY 1/> REGIONS: CPT

## 2021-07-23 NOTE — FLOWSHEET NOTE
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.42)  · Treatment Diagnosis: low back pain with sciatica, L shoulder pain with mobility deficits    Insurance/Certification information:  PT Insurance Information: [employer], pre-auth required, $21 co-pay    Physician Information:  Referring Practitioner: María Elena Gonzales DO    Plan of care signed :  []  Yes  [x] No  []  Cosign [x]  Fax 21     Date of Patient follow up with Physician:     Is this a Progress Report:     []  Yes  [x]  No      If Yes:  Date Range for reporting period:  Beginning 21   Ending         Progress report will be due (10 Rx or 30 days whichever is less):        Recertification will be due (POC Duration  / 90 days whichever is less): 8 visits      Visit # Insurance Allowable Auth Required   ,  for new POC 16 authorized [x]  Yes []  No        Functional Scale: GISSELLE      Date 21  Score: 27/50 = 54% disability  24/ 50 = 48%  26/50 = 52%    Functional Scale: SPADI   21   Date 21      Score: 57/130 = 56% disability 67/130 = 48% disability      Latex Allergy:  [x]NO      []YES  Preferred Language for Healthcare:   [x]English       []other:    RESTRICTIONS/PRECAUTIONS: previous hx of laminectomy     SUBJECTIVE:    · Took to new lower back exercises fairly well - had some pain with exercises, but not much  · L shoulder has been bothering him more this week with certain motions. · Reports that he has received a referral to go see an orthopedic doctor about diagnostic imaging for the shoulder and possible corticosteroid injection.     * sign for shoulder: picking up weighted items, horizontal abduction, abduction with arm at side, reaching overhead, especially with weighted items    *sign for lower back: He reports that the shooting pain down his LEs is reproduced with bending forward to tie his shoes, turning/rotating to reach for items, and picking up items off of the ground    Pain level:      Plan Moving Forward/ For next visit:    Progress to standing trunk strengthening activities as tolerated.  Progress Left Shoulder AROM and Strength Ex   Continue to assess response to repeated lumbar extension to help centralize lower back/LE symptoms       OBJECTIVE:       Exercises/Interventions:     Exercises in bold performed in department today. Items not bolded are carried forward from prior visits for continuity of the record.   Exercise/Equipment Resistance/Repetitions HEP Other comments   Sidelying sciatic nerve glide in R sidelying   2 min []    Supine sciatic nerve glide  1 min 15 sec [x]    Prone press-up   2 min; 1 min each with LE pointed directionally [x]    Bridging with trunk rotation 3 x 8 reps  2x20 [x] UEs extended, holding ball    Standing hip hinge 3 x 10 reps     Bridge with sustained abd  RTB 3x15     Bridge with sustained ADD (Ball) 2x20     Lumbar roll stretch   1 min in L sidelying [x] Reviewed for HEP   Lumbar lateral glides   3 x 20-25 reps hips to L []    Recumbent bike   3 min, RPE 4-6/10 []    Elliptical  2 min     SLR with contralateral arm extended  TA activation + supine marching   1 x 10 knees to chest, 2 x 10 holding 8# DB  2 x 12 []    Seated sciatic nerve glide  2 x 1 min [x]     Single knee to chest    5 x 15 sec hold bilaterally [] Using towel to pull knee towards chest   Lumbar rotation dynamic stretch    1 min 30 sec []    Side plank   6 reps each side [] HOLD until L shoulder pain improves   Standing lumbar flexion 1 min  Walking hands down legs, increased pain with activity   Seated lumbar flexion  2 min [] In pain-free ROM   Forward lunge  2 x 10, holding 9# in L hand [] L LE only   Goblet squat with slow eccentric   3 x 10, holding 10# weight []    Figure 4 stretch with lumbar rotation  2 min [] Symptoms down L LE after 1 min 30 sec   Standing Hip Abduction  3x10   9#     Lateral step out/ squats with Glute squeeze 6 length of // bars  9#     Standing Hip Extension Taps    3x10  9# []    Mule Kick  3x10 9#     Prone trunk extension 2 x 12  In partial ROM, with assistance from hands   Pelvic/ Hip Flexibility  stretches NV     Standing lumbar extension 10 reps, 10 reps with hands on wall  Pt complaints of tightness/reduced ROM in lower back   SHOULDER COMPLEX      AAROM in supine x10     Sleeper stretch x10 5 sec hold     Mid Row  25# - 2 x 10   2x15  Using cable machine   Row Row  2x15     Pulley 1 min flexion, 1 min scaption     Shoulder abduction isometric 3 x 1 min     Bent over row 2 x 10 reps  L UE only, R UE fixed on table   Sidelying shoulder abduction AMRAP with 2# - 15 reps, 5 RIR      Landmine press  6# on stick - 24 reps (3 RIR), 9# on stick - 2 x 12     Shoulder horizontal abduction 2.5# - 2x8, 5# - 8 reps  Using cable machine   Sidelying shoulder flexion 3# - 7 reps (RIR 2),      Sidelying shoulder external rotation 3# - 10 reps, 11 reps (RIR 2)     Standing closed chain shoulder extension 2 x 1 min  Holding doorjamb     Therapeutic Exercise/Home Exercise Program:   28 minutes  Pt inst in role of PT, prognosis, plan of care, use of CP/HP, activity modification, and benefits of therapy. HEP has been established, See above, pt given handout(s) of new exercises. Updated HEP given to pt today, reviewed with patient at end of session. See HEP exercises below. Provided RTB    Access Code: [email or web address]  URL: [email or web address]  Date: 07/19/2021  Prepared by: Berna Ontiveros    Exercises  Seated Lumbar Flexion Stretch - 1-2 x daily - 5-7 x weekly - 1-2 sets - 10 reps - 5-10 hold  Supine Figure 4 Piriformis Stretch - 1 x daily - 5-7 x weekly - 2-4 sets - 5-8 reps - 5-10 seconds hold  Upper Back Extension Off Table - 1 x daily - 4-5 x weekly - 2-3 sets - 8-12 reps - 2 seconds hold  Standing Hip Hinge with Dowel - 1 x daily - 4-5 x weekly - 2-3 sets - 8-12 reps        Access Code: [email or web address]  URL: [email or web address]  Date: 07/23/2021  Prepared by: Parrish Kat    Exercises  Shoulder External Rotation and Scapular Retraction with Resistance - 1 x daily - 7 x weekly - 3 sets - 10 reps  Standing Bent Over Single Arm Scapular Row with Table Support - 1 x daily - 3-4 x weekly - 3 sets - 8-12 reps  Sidelying Shoulder Abduction Palm Forward - 1 x daily - 3-4 x weekly - 3 sets - 8-12 reps  Standing Shoulder Horizontal Abduction with Resistance - 1 x daily - 4-5 x weekly - 3 sets - 8-12 reps  Sidelying Shoulder Flexion 15 Degrees - 1 x daily - 4-5 x weekly - 2-3 sets - 5-10 reps  Sidelying Shoulder ER with Towel and Dumbbell - 1 x daily - 3-4 x weekly - 2-3 sets - 6-12 reps          Therapeutic Activity:  0 minutes     Gait: 0 minutes    Neuromuscular Re-Education:  0 minutes      Canalith Repositioning Procedure:      Manual Therapy:  15 minutes   Neutral Alignment Today. Focus of manual tx shifted to Left Shoulder   Joint mobs GR III, IV : posterior , inferior ; distraction   PROM to shoulder flexion, Abd, IR, ER   Gentle manual distraction of shoulder with MFR unwinding. Improved ROM at end of session    Lumbar rotation with overpressure - both directions. Decreased ROM with rotation to R, mild L lower back and glute pain with rotation to R  Unilateral PA mobs to lumbar spine - L1-5 bilaterally      Manual traction   L hip inferior mobilization + passive stretching into hip flexion  L hip lateral mobilization + passive stretching into hip IR  PNF agonist reversal L hip flexion/extension  Lumbar lateral glides, hips to L w/overpressure from therapist  MET to promote pelvic alignment/ stabilization in supine, sidelying. Long axis distraction       Modalities: 0 minutes  DC Mechanical Txn.   Progress to core and pelvic strength exercises. Mechanical lumbar traction, 15 minutes, Blue txn belts, 45 sec on/15 sec off, 57 kg pull on/28 kg pull off, pt LEs elevated on stool  Pt reported no increase in pain during activity. Felt stronger pull with blue belts. ASSESSMENT:    Pt had most severe increases in pain with overhead shoulder abduction and with passive IR beyond 30-40 degrees. Pain-free L shoulder mobility improved slightly following manual treatment. Pt still demonstrates ROM and strength deficits in the L shoulder at this time. Goals:   Patient stated goal:  \"I would just like to be able to have less pain and more movement\" - 40% progress as of 07/19/21  [x]? Progressing: []? Met: []? Not Met: []? Adjusted     Therapist goals for Patient:   Short Term Goals: To be achieved in: 4 weeks  1. Independent in HEP and progression per patient tolerance, in order to prevent re-injury. [x]? Progressing: [x]? Met: []? Not Met: []? Adjusted  2. Patient's will have a decrease in pain to 4/10 on average to facilitate improvement in movement, function, and ADLs as indicated by Functional Deficits. []? Progressing: [x]? Met: []? Not Met: []? Adjusted     Long Term Goals: To be achieved in: 8 weeks  1. Disability index score of 40% or less for the GISSELLE to assist with improving functional mobility and capacity. [x]? Progressing: []? Met: []? Not Met: []? Adjusted  2. Patient will demonstrate increased lumbar AROM to 100% WNL with no LE symptoms. [x]? Progressing: []? Met: []? Not Met: []? Adjusted  3. Patient will demonstrate an increase in NM recruitment/activation and overall GH and scapular strength to 4+/5 or WNL for proper functional mobility as indicated by patients Functional Deficits. [x]? Progressing: []? Met: []? Not Met: []? Adjusted  4. Patient will complete a 20 minute strength training workout with minimal to no low back pain. 0% progress as of 07/19/21  []? Progressing: []? Met: [x]?  Not Met: []? Adjusted    Overall Progression Towards Functional goals/ Treatment Progress Update:  [x] Patient is progressing as expected towards functional goals listed. [] Progression is slowed due to complexities/Impairments listed. [] Progression has been slowed due to co-morbidities. [] Plan just implemented, too soon to assess goals progression <30days   [] Goals require adjustment due to lack of progress  [] Patient is not progressing as expected and requires additional follow up with physician  [] Other    Prognosis for POC: [x] Good [] Fair  [] Poor    Patient requires continued skilled intervention: [x] Yes  [] No    Treatment/Activity Tolerance:  [x] Patient able to complete treatment  [] Patient limited by fatigue  [] Patient limited by pain    [] Patient limited by other medical complications  [] Other:         PLAN: See eval  [] Continue per plan of care [x] Alter current plan (see comments above)  [] Plan of care initiated [] Hold pending MD visit [] Discharge        Therapeutic Exercise and NMR EXR  [x] (01010) Provided verbal/tactile cueing for activities related to strengthening, flexibility, endurance, ROM  for improvements in proximal strength and core control with self care, mobility, lifting and ambulation.  [] (33868) Provided verbal/tactile cueing for activities related to improving balance, coordination, kinesthetic sense, posture, motor skill, proprioception  to assist with core control in self care, mobility, lifting, and ambulation.      Therapeutic Activities and Gait:    [] (87736 or 93770) Provided verbal/tactile cueing for activities related to improving balance, coordination, kinesthetic sense, posture, motor skill, proprioception and motor activation to allow for proper function  with self care and ADLs  [] (40747) Provided training and instruction to the patient for proper core and proximal hip recruitment and positioning with ambulation re-education     Home Exercise Program:    [x] (80559) Reviewed/Progressed HEP activities related to strengthening, flexibility, endurance, ROM of core, proximal hip and LE for functional self-care, mobility, lifting and ambulation   [] (87351) Reviewed/Progressed HEP activities related to improving balance, coordination, kinesthetic sense, posture, motor skill, proprioception of core, proximal hip and LE for self care, mobility, lifting, and ambulation      Manual Treatments:  PROM / STM / Oscillations-Mobs:  G-I, II, III, IV (PA's, Inf., Post.)  [x] (31250) Provided manual therapy to mobilize proximal hip and LS spine soft tissue/joints for the purpose of modulating pain, promoting relaxation,  increasing ROM, reducing/eliminating soft tissue swelling/inflammation/restriction, improving soft tissue extensibility and allowing for proper ROM for normal function with self care, mobility, lifting and ambulation. Modalities:       Charges:  Timed Code Treatment Minutes: 43   Total Treatment Minutes: 43         [] EVAL  [x] XE(66199) x  2  [] IONTO  [] NMR (60506) x     [] VASO  [x] Manual (50615) x 1     [] Other:  [] TA x     [] Gait x   [] Mech Traction (25944)  [] ES(attended) (24326)     [] ES (un) (22913):       Electronically signed by: Ann Marie Palmer PT, DPT, Karen Arreaga #[id number]        Note: If patient does not return for scheduled/ recommended follow up visits, this note will serve as a discharge from care along with most recent update on progress.

## 2021-07-26 ENCOUNTER — HOSPITAL ENCOUNTER (OUTPATIENT)
Dept: PHYSICAL THERAPY | Age: 41
Setting detail: THERAPIES SERIES
Discharge: HOME | End: 2021-07-26
Payer: OTHER GOVERNMENT

## 2021-07-26 PROCEDURE — 97110 THERAPEUTIC EXERCISES: CPT

## 2021-07-26 PROCEDURE — 97140 MANUAL THERAPY 1/> REGIONS: CPT

## 2021-07-26 NOTE — FLOWSHEET NOTE
Physical Therapy Daily Treatment Note       Date:  2021    Patient Name:  Kandis Cooper    :  1980  MRN: [id number]      Medical/Treatment Diagnosis Information:  · Diagnosis: Acute pain of left shoulder (M25.512), chronic bilateral low back pain with bilateral sciatica (M54.42)  · Treatment Diagnosis: low back pain with sciatica, L shoulder pain with mobility deficits    Insurance/Certification information:  PT Insurance Information: , pre-auth required, $21 co-pay    Physician Information:  Referring Practitioner: Bladimir Armijo DO    Plan of care signed :  []  Yes  [x] No  []  Cosign [x]  Fax 21     Date of Patient follow up with Physician:     Is this a Progress Report:     []  Yes  [x]  No      If Yes:  Date Range for reporting period:  Beginning 21   Ending         Progress report will be due (10 Rx or 30 days whichever is less):  25      Recertification will be due (POC Duration  / 90 days whichever is less): 8 visits      Visit # Insurance Allowable Auth Required   ,  for new POC 16 authorized [x]  Yes []  No        Functional Scale: GISSELLE      Date 21  Score: 27/50 = 54% disability  24/ 50 = 48%  26/50 = 52%    Functional Scale: SPADI   21   Date 21      Score: 57/130 = 56% disability 67/130 = 48% disability      Latex Allergy:  [x]NO      []YES  Preferred Language for Healthcare:   [x]English       []other:    RESTRICTIONS/PRECAUTIONS: previous hx of laminectomy     SUBJECTIVE:    · Reports that his back is feeling alright today, a little tight. · Notes that his L shoulder mobility is improving slightly but he still has consistent pain in the shoulder.      * sign for shoulder: picking up weighted items, horizontal abduction, abduction with arm at side, reaching overhead, especially with weighted items    *sign for lower back: He reports that the shooting pain down his LEs is reproduced with bending forward to tie his shoes, turning/rotating to reach for items, and picking up items off of the ground    Pain level:  3/10    Plan Moving Forward/ For next visit:    Progress to standing trunk strengthening activities as tolerated.  Progress Left Shoulder AROM and Strength Ex          OBJECTIVE:       Exercises/Interventions:     Exercises in bold performed in department today. Items not bolded are carried forward from prior visits for continuity of the record.   Exercise/Equipment Resistance/Repetitions HEP Other comments   Sidelying sciatic nerve glide in R sidelying   2 min []    Supine sciatic nerve glide  1 min 15 sec [x]    Prone press-up   2 min; 1 min each with LE pointed directionally [x]    Bridging with trunk rotation 3 x 8 reps  2x20 [x] UEs extended, holding ball    Standing hip hinge 12 reps     Bridge with sustained abd  RTB 3x15     Bridge with sustained ADD (Ball) 2x20     Lumbar roll stretch   1 min in L sidelying [x] Reviewed for HEP   Lumbar lateral glides   3 x 20-25 reps hips to L []    Recumbent bike   3 min, RPE 4-6/10 []    Elliptical  5 min     SLR with contralateral arm extended  TA activation + supine marching   1 x 10 knees to chest, 2 x 10 holding 8# DB  2 x 12 []    Seated sciatic nerve glide  2 x 1 min [x]     Single knee to chest    5 x 15 sec hold bilaterally [] Using towel to pull knee towards chest   Lumbar rotation dynamic stretch    1 min 30 sec []    Side plank   6 reps each side [] HOLD until L shoulder pain improves   Standing lumbar flexion 1 min  Walking hands down legs, increased pain with activity   Seated lumbar flexion  2 min [] In pain-free ROM   Forward lunge  2 x 10, holding 9# in L hand [] L LE only   Goblet squat with slow eccentric   2 x 12, holding 10# weight []    Figure 4 stretch with lumbar rotation  2 min [] Symptoms down L LE after 1 min 30 sec   Standing Hip Abduction  3x10   9#     Lateral step out/ squats with Glute squeeze 6 length of // bars  9#     Standing Hip Extension Taps    3x10  9# []    Mule Kick  3x10 9#     Suitcase carry 2 sets of 4 x 50 ft trips, 30# in bucket     Standing diagonal chop 2 x 10, 7.5#     Standing Pallof Press 2 x 12, 5#  Using cable machine   Prone trunk extension 2 x 12  Partial ROM, no assist of hands   Pelvic/ Hip Flexibility  stretches NV     Standing lumbar extension 10 reps, 10 reps with hands on wall  Pt complaints of tightness/reduced ROM in lower back   SHOULDER COMPLEX      AAROM in supine x10     Sleeper stretch x10 5 sec hold     Mid Row  25# - 2 x 10   2x15  Using cable machine   Row Row  2x15     Pulley 1 min flexion, 1 min scaption     Shoulder abduction isometric 3 x 1 min     Bent over row 2 x 10 reps  L UE only, R UE fixed on table   Sidelying shoulder abduction AMRAP with 2# - 15 reps, 5 RIR      Landmine press  6# on stick - 24 reps (3 RIR), 9# on stick - 2 x 12     Shoulder horizontal abduction 2.5# - 2x8, 5# - 8 reps  Using cable machine   Sidelying shoulder flexion 3# - 7 reps (RIR 2),      Sidelying shoulder external rotation 3# - 10 reps, 11 reps (RIR 2)     Standing closed chain shoulder extension 2 x 1 min  Holding doorjamb     Therapeutic Exercise/Home Exercise Program:   34 minutes  Pt inst in role of PT, prognosis, plan of care, use of CP/HP, activity modification, and benefits of therapy. HEP has been established, See above, pt given handout(s) of new exercises. Updated HEP given to pt today, reviewed with patient at end of session. See HEP exercises below. Provided RTB    Access Code: [email or web address]  URL: [email or web address]  Date: 07/26/2021  Prepared by: Gari Goltz    Exercises  Seated Lumbar Flexion Stretch - 1-2 x daily - 5-7 x weekly - 1-2 sets - 10 reps - 5-10 hold  Supine Figure 4 Piriformis Stretch - 1 x daily - 5-7 x weekly - 2-4 sets - 5-8 reps - 5-10 seconds hold  Upper Back Extension Off Table - 1 x daily - 2-3 x weekly - 2-3 sets - 8-12 reps - 2 seconds hold  Standing Hip Hinge with Mechanical Txn. Progress to core and pelvic strength exercises. Mechanical lumbar traction, 15 minutes, Blue txn belts, 45 sec on/15 sec off, 57 kg pull on/28 kg pull off, pt LEs elevated on stool  Pt reported no increase in pain during activity. Felt stronger pull with blue belts. ASSESSMENT:    Pt was able to complete a progression to standing strengthening exercises with minimal to no increase in symptoms. His home program was updated to reflect this progression in his functional ability/strength. Goals:   Patient stated goal:  \"I would just like to be able to have less pain and more movement\" - 40% progress as of 07/19/21  [x]? Progressing: []? Met: []? Not Met: []? Adjusted     Therapist goals for Patient:   Short Term Goals: To be achieved in: 4 weeks  1. Independent in HEP and progression per patient tolerance, in order to prevent re-injury. [x]? Progressing: [x]? Met: []? Not Met: []? Adjusted  2. Patient's will have a decrease in pain to 4/10 on average to facilitate improvement in movement, function, and ADLs as indicated by Functional Deficits. []? Progressing: [x]? Met: []? Not Met: []? Adjusted     Long Term Goals: To be achieved in: 8 weeks  1. Disability index score of 40% or less for the GISSELLE to assist with improving functional mobility and capacity. [x]? Progressing: []? Met: []? Not Met: []? Adjusted  2. Patient will demonstrate increased lumbar AROM to 100% WNL with no LE symptoms. [x]? Progressing: []? Met: []? Not Met: []? Adjusted  3. Patient will demonstrate an increase in NM recruitment/activation and overall GH and scapular strength to 4+/5 or WNL for proper functional mobility as indicated by patients Functional Deficits. [x]? Progressing: []? Met: []? Not Met: []? Adjusted  4. Patient will complete a 20 minute strength training workout with minimal to no low back pain. 0% progress as of 07/19/21  []? Progressing: []? Met: [x]? Not Met: []?  Adjusted    Overall Progression Towards Functional goals/ Treatment Progress Update:  [x] Patient is progressing as expected towards functional goals listed. [] Progression is slowed due to complexities/Impairments listed. [] Progression has been slowed due to co-morbidities. [] Plan just implemented, too soon to assess goals progression <30days   [] Goals require adjustment due to lack of progress  [] Patient is not progressing as expected and requires additional follow up with physician  [] Other    Prognosis for POC: [x] Good [] Fair  [] Poor    Patient requires continued skilled intervention: [x] Yes  [] No    Treatment/Activity Tolerance:  [x] Patient able to complete treatment  [] Patient limited by fatigue  [] Patient limited by pain    [] Patient limited by other medical complications  [] Other:         PLAN: See eval  [] Continue per plan of care [x] Alter current plan (see comments above)  [] Plan of care initiated [] Hold pending MD visit [] Discharge        Therapeutic Exercise and NMR EXR  [x] (43898) Provided verbal/tactile cueing for activities related to strengthening, flexibility, endurance, ROM  for improvements in proximal strength and core control with self care, mobility, lifting and ambulation.  [] (21681) Provided verbal/tactile cueing for activities related to improving balance, coordination, kinesthetic sense, posture, motor skill, proprioception  to assist with core control in self care, mobility, lifting, and ambulation.      Therapeutic Activities and Gait:    [] (32725 or 74771) Provided verbal/tactile cueing for activities related to improving balance, coordination, kinesthetic sense, posture, motor skill, proprioception and motor activation to allow for proper function  with self care and ADLs  [] (86052) Provided training and instruction to the patient for proper core and proximal hip recruitment and positioning with ambulation re-education     Home Exercise Program:    [x] (57934) Reviewed/Progressed HEP activities related to strengthening, flexibility, endurance, ROM of core, proximal hip and LE for functional self-care, mobility, lifting and ambulation   [] (24093) Reviewed/Progressed HEP activities related to improving balance, coordination, kinesthetic sense, posture, motor skill, proprioception of core, proximal hip and LE for self care, mobility, lifting, and ambulation      Manual Treatments:  PROM / STM / Oscillations-Mobs:  G-I, II, III, IV (PA's, Inf., Post.)  [x] (22112) Provided manual therapy to mobilize proximal hip and LS spine soft tissue/joints for the purpose of modulating pain, promoting relaxation,  increasing ROM, reducing/eliminating soft tissue swelling/inflammation/restriction, improving soft tissue extensibility and allowing for proper ROM for normal function with self care, mobility, lifting and ambulation. Modalities:       Charges:  Timed Code Treatment Minutes: 42   Total Treatment Minutes: 42         [] EVAL  [x] ZT(93838) x  2  [] IONTO  [] NMR (46169) x     [] VASO  [x] Manual (20284) x 1     [] Other:  [] TA x     [] Gait x   [] [facility] (98271[facility]  [] ES(attended) (96532)     [] ES (un) (57435):       Electronically signed by: Niurka Malone PT, DPT, Tala Mcmahon #[id number]        Note: If patient does not return for scheduled/ recommended follow up visits, this note will serve as a discharge from care along with most recent update on progress.

## 2021-07-30 ENCOUNTER — HOSPITAL ENCOUNTER (OUTPATIENT)
Dept: PHYSICAL THERAPY | Age: 41
Setting detail: THERAPIES SERIES
Discharge: HOME | End: 2021-07-30
Payer: OTHER GOVERNMENT

## 2021-07-30 PROCEDURE — 97140 MANUAL THERAPY 1/> REGIONS: CPT

## 2021-07-30 PROCEDURE — 97110 THERAPEUTIC EXERCISES: CPT

## 2021-07-30 NOTE — FLOWSHEET NOTE
overhead, especially with weighted items, putting hand behind his back    *sign for lower back: He reports that the shooting pain down his LEs is reproduced with bending forward to tie his shoes, turning/rotating to reach for items, and picking up items off of the ground    Pain level:  3/10    Plan Moving Forward/ For next visit:    Progress to standing trunk strengthening activities as tolerated.  Progress Left Shoulder AROM and Strength Ex          OBJECTIVE:       Exercises/Interventions:     Exercises in bold performed in department today. Items not bolded are carried forward from prior visits for continuity of the record.   Exercise/Equipment Resistance/Repetitions HEP Other comments   Sidelying sciatic nerve glide in R sidelying   2 min []    Supine sciatic nerve glide  1 min 15 sec [x]    Prone press-up   2 min; 1 min each with LE pointed directionally [x]    Bridging with trunk rotation 3 x 8 reps  2x20 [x] UEs extended, holding ball    Standing hip hinge 12 reps     Bridge with sustained abd  RTB 3x15     Bridge with sustained ADD (Ball) 2x20     Lumbar roll stretch   1 min in L sidelying [x] Reviewed for HEP   Lumbar lateral glides   3 x 20-25 reps hips to L []    Recumbent bike   3 min, RPE 4-6/10 []    Elliptical  5 min     SLR with contralateral arm extended  TA activation + supine marching   1 x 10 knees to chest, 2 x 10 holding 8# DB  2 x 12 _    Seated sciatic nerve glide  2 x 1 min _     Single knee to chest    5 x 15 sec hold bilaterally _ Using towel to pull knee towards chest   Lumbar rotation dynamic stretch    1 min 30 sec _    _Side plank   6 reps each side _ HOLD until L shoulder pain improves   Standing lumbar flexion 1 min  Walking hands down legs, increased pain with activity   Seated lumbar flexion  2 min _ In pain-free ROM   Forward lunge  2 x 10, holding 9# in L hand _ L LE only   Goblet squat with slow eccentric   2 x 12, holding 10# weight _    Figure 4 stretch with lumbar rotation  2 min _ Symptoms down L LE after 1 min 30 sec   Standing Hip Abduction  3x10   9#     Lateral step out/ squats with Glute squeeze 6 length of // bars  9#     Standing Hip Extension Taps    3x10  9# _    Mule Kick  3x10 9#     Suitcase carry 2 sets of 4 x 50 ft trips, 30# in bucket     Standing diagonal chop 2 x 10, 7.5#     Standing Pallof Press 2 x 12, 5#  Using cable machine   Prone trunk extension 2 x 12  Partial ROM, no assist of hands   Pelvic/ Hip Flexibility  stretches NV     Standing lumbar extension 10 reps, 10 reps with hands on wall  Pt complaints of tightness/reduced ROM in lower back   SHOULDER COMPLEX      AAROM in supine x10     Sleeper stretch x10 5 sec hold     Mid Row  25# - 2 x 10   2x15  Using cable machine   Row Row  2x15     Pulley 1 min flexion, 1 min scaption     Shoulder abduction isometric 3 x 1 min     Bent over row 3 x 12 reps, 12.5#  L UE only, using cable machine   Sidelying shoulder abduction AMRAP with 2# - 15 reps, 5 RIR      Standing shoulder IR 10 reps - 7.5#, 2 x 10 reps - 10#  On cable machine   Standing shoulder ER 3 x 10 reps - 5#  On cable machine   's carry 2 rounds oX 2 trips each arm, 2# on L, 6# on R     Push up to table 2 x 8     OH Press 3 x 10, 7# on stick     Landmine press  6# on stick - 24 reps (3 RIR), 9# on stick - 2 x 12     Shoulder horizontal abduction 2.5# - 2x8, 5# - 8 reps  Using cable machine   Sidelying shoulder flexion 3# - 7 reps (RIR 2),      Sidelying shoulder external rotation 3# - 10 reps, 11 reps (RIR 2)     Standing closed chain shoulder extension 1 min  Holding doorjamb     Therapeutic Exercise/Home Exercise Program:   37 minutes  Pt inst in role of PT, prognosis, plan of care, use of CP/HP, activity modification, and benefits of therapy. HEP has been established, See above, pt given handout(s) of new exercises. Updated HEP given to pt today, reviewed with patient at end of session. See HEP exercises below.  Provided Unilateral PA mobs to lumbar spine - L1-5 bilaterally      Manual traction   L hip inferior mobilization + passive stretching into hip flexion  L hip lateral mobilization + passive stretching into hip IR  PNF agonist reversal L hip flexion/extension  Lumbar lateral glides, hips to L w/overpressure from therapist  MET to promote pelvic alignment/ stabilization in supine, sidelying. Long axis distraction       Modalities: 0 minutes  DC Mechanical Txn. Progress to core and pelvic strength exercises. Mechanical lumbar traction, 15 minutes, Blue txn belts, 45 sec on/15 sec off, 57 kg pull on/28 kg pull off, pt LEs elevated on stool  Pt reported no increase in pain during activity. Felt stronger pull with blue belts. ASSESSMENT:    Pt completed a progression of his L shoulder strengthening activities with minimal to no increase in his symptoms. He continues to have the most discomfort with shoulder abduction and external rotation. He was encouraged to reach out to his orthopedic doctor to discuss other treatment options for reducing his shoulder pain. Pt's POC expires at next visit, pt will benefit from additional visits to further improve his functional capacity in his lower back and L shoulder. Goals:   Patient stated goal:  \"I would just like to be able to have less pain and more movement\" - 40% progress as of 07/19/21  [x]? Progressing: []? Met: []? Not Met: []? Adjusted     Therapist goals for Patient:   Short Term Goals: To be achieved in: 4 weeks  1. Independent in HEP and progression per patient tolerance, in order to prevent re-injury. [x]? Progressing: [x]? Met: []? Not Met: []? Adjusted  2. Patient's will have a decrease in pain to 4/10 on average to facilitate improvement in movement, function, and ADLs as indicated by Functional Deficits. []? Progressing: [x]? Met: []? Not Met: []? Adjusted     Long Term Goals: To be achieved in: 8 weeks  1.  Disability index score of 40% or less for the patient does not return for scheduled/ recommended follow up visits, this note will serve as a discharge from care along with most recent update on progress.

## 2021-08-02 ENCOUNTER — APPOINTMENT (OUTPATIENT)
Dept: PHYSICAL THERAPY | Age: 41
End: 2021-08-02
Payer: OTHER GOVERNMENT

## 2021-08-06 ENCOUNTER — APPOINTMENT (OUTPATIENT)
Dept: PHYSICAL THERAPY | Age: 41
End: 2021-08-06
Payer: OTHER GOVERNMENT

## 2021-08-27 ENCOUNTER — HOSPITAL ENCOUNTER (OUTPATIENT)
Dept: PHYSICAL THERAPY | Age: 41
Setting detail: THERAPIES SERIES
Discharge: HOME | End: 2021-08-27
Payer: OTHER GOVERNMENT

## 2021-08-27 NOTE — PROGRESS NOTES
Physical Therapy  Cancellation/No-show Note    Patient Name:  Julianne Bush  :  1980   Date:  2021  Cancels to Date: 0  No-shows to Date: 1    For today's appointment patient:  []  Cancelled  []  Rescheduled appointment  [x]  No-show     Reason given by patient:  []  Patient ill  []  Conflicting appointment  []  No transportation    []  Conflict with work  []  No reason given  []  Other:     Comments:      Electronically signed by:  Pauline Singh [address] [place] [address], [address] [place] [address], [address] [id number]

## 2021-08-30 ENCOUNTER — HOSPITAL ENCOUNTER (OUTPATIENT)
Dept: PHYSICAL THERAPY | Age: 41
Setting detail: THERAPIES SERIES
Discharge: HOME | End: 2021-08-30
Payer: OTHER GOVERNMENT

## 2021-08-30 PROCEDURE — 97140 MANUAL THERAPY 1/> REGIONS: CPT

## 2021-08-30 PROCEDURE — 97110 THERAPEUTIC EXERCISES: CPT

## 2021-08-30 NOTE — PROGRESS NOTES
Physical Therapy Daily Treatment Note/Progress Note/Recert note   Patient was seen for 16 Visits of PT. Initial eval date 21. Pt progressed fair towards long term therapy goals as he still has limited use of his L shoulder at this time due to pain. Plan to continue per POC, to continue progress toward stated goals. Please see attached treatment note for most recent status. Thank you for your referral of this patient.      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.42)  · Treatment Diagnosis: low back pain with sciatica, L shoulder pain with mobility deficits    Insurance/Certification information:  PT Insurance Information: , pre-auth required, $21 co-pay    Physician Information:  Referring Practitioner: Tomer Ornelas DO    Plan of care signed :  []  Yes  [x] No  []  Cosign [x]  Fax 21     Date of Patient follow up with Physician:     Is this a Progress Report:     []  Yes  [x]  No      If Yes:  Date Range for reporting period:  Beginning 21   Ending  21       Progress report will be due (10 Rx or 30 days whichever is less):        Recertification will be due (POC Duration  / 90 days whichever is less): 8 visits      Visit # Insurance Allowable Auth Required   , 3/8 for new POC 16 authorized [x]  Yes []  No        Functional Scale: GISSELLE      Date 21  Score: 27/50 = 54% disability  24/ 50 = 48%  26/50 = 52%   24/50 = 48%    Functional Scale: SPADI   21   Date 21      Score: 57/130 = 44% disability 67/130 = 52% disability 62/130 = 48% disability      Latex Allergy:  [x]NO      []YES  Preferred Language for Healthcare:   [x]English []other:    RESTRICTIONS/PRECAUTIONS: previous hx of laminectomy     SUBJECTIVE:    Pt reports having MRI performed on his L shoulder last week, results showed a torn labrum. Pt notes that he had follow up with Dr. Radha Heath after the MRI and they decided to continue with more physical therapy to see if he is able to avoid having surgery on the shoulder. Pt reports that his shoulder pain and function has improved slightly since beginning PT. He notes that he continues to have the most pain with reaching in different directions, especially with horizontal abduction past 90 degrees and with reaching behind his back. * sign for shoulder: picking up weighted items, horizontal abduction, abduction with arm at side, reaching overhead, especially with weighted items, putting hand behind his back    *sign for lower back: He reports that the shooting pain down his LEs is reproduced with bending forward to tie his shoes, turning/rotating to reach for items, and picking up items off of the ground    Pain level:  3/10 in L shoulder current, 8/10 or higher with activity    Plan Moving Forward/ For next visit:    Progress to standing trunk strengthening activities as tolerated.  Progress Left Shoulder AROM and Strength Ex          OBJECTIVE:     UE Range of Motion/Strength Testing      [x]? All ROM WFL except as marked below   [x]? All strength WFL (5/5) except as marked below    [x]? All myotomes WFL (5/5) except as marked below                 Range Tested MMT / Resisted PROM AROM Comments   *denotes pain Left Right Left Right Left Right     Cervical Flexion C1-2                 Cervical Sidebend  C3                 Shoulder Shrug  C4                 Shoulder Flexion  4+ 4     118  120      Shoulder Extension  4  5     50*   50     Shoulder Abduction  C5  4 4+     87   100     Shoulder Adduction                  Shoulder IR  4+ 5     75        Shoulder ER  5 5     35*         Functional ER:  R: to T4  L: to C6    Functional IR: To buttock bilaterally, L slightly more limited    Exercises/Interventions:     Exercises in bold performed in department today. Items not bolded are carried forward from prior visits for continuity of the record.   Exercise/Equipment Resistance/Repetitions HEP Other comments   Sidelying sciatic nerve glide in R sidelying   2 min []    Supine sciatic nerve glide  1 min 15 sec [x]    Prone press-up   2 min; 1 min each with LE pointed directionally [x]    Bridging with trunk rotation 3 x 8 reps  2x20 [x] UEs extended, holding ball    Standing hip hinge 12 reps     Bridge with sustained abd  RTB 3x15     Bridge with sustained ADD (Ball) 2x20     Lumbar roll stretch   1 min in L sidelying [x] Reviewed for HEP   Lumbar lateral glides   3 x 20-25 reps hips to L []    Recumbent bike   3 min, RPE 4-6/10 []    Elliptical  5 min     SLR with contralateral arm extended  TA activation + supine marching   1 x 10 knees to chest, 2 x 10 holding 8# DB  2 x 12 _    Seated sciatic nerve glide  2 x 1 min _     Single knee to chest    5 x 15 sec hold bilaterally _ Using towel to pull knee towards chest   Lumbar rotation dynamic stretch    1 min 30 sec _    _Side plank   6 reps each side _ HOLD until L shoulder pain improves   Standing lumbar flexion 1 min  Walking hands down legs, increased pain with activity   Seated lumbar flexion  2 min _ In pain-free ROM   Forward lunge  2 x 10, holding 9# in L hand _ L LE only   Goblet squat with slow eccentric   2 x 12, holding 10# weight _    Figure 4 stretch with lumbar rotation  2 min _ Symptoms down L LE after 1 min 30 sec   Standing Hip Abduction  3x10   9#     Lateral step out/ squats with Glute squeeze 6 length of // bars  9#     Standing Hip Extension Taps    3x10  9# _    Mule Kick  3x10 9#     Suitcase carry 2 sets of 4 x 50 ft trips, 30# in bucket     Standing diagonal chop 2 x 10, 7.5#     Standing Pallof Press 2 x 12, 5#  Using cable machine   Prone trunk extension 2 x 12  Partial ROM, no assist of hands   Pelvic/ Hip Flexibility  stretches NV     Standing lumbar extension 10 reps, 10 reps with hands on wall  Pt complaints of tightness/reduced ROM in lower back   SHOULDER COMPLEX      AAROM in supine x10     Sleeper stretch x10 5 sec hold     Mid Row  25# - 2 x 10   2x15  Using cable machine   Row Row  2x15     Pulley 1 min flexion, 1 min scaption     Shoulder abduction isometric 3 x 1 min     Wall slides 1 min flexion, 1 min scaption     Bent over row 3 x 12 reps, 12.5#  L UE only, using cable machine   Sidelying shoulder abduction AMRAP with 2# - 15 reps, 5 RIR      Standing shoulder IR 10 reps - 7.5#, 2 x 10 reps - 10#  On cable machine   Standing shoulder ER 3 x 10 reps - 5#  On cable machine   's carry 2 rounds oX 2 trips each arm, 2# on L, 6# on R     Push up to table 2 x 8     OH Press 3 x 10, 7# on stick     Landmine press  6# on stick - 24 reps (3 RIR), 9# on stick - 2 x 12     Shoulder horizontal abduction 2.5# - 2x8, 5# - 8 reps  Using cable machine   Sidelying shoulder flexion 3# - 7 reps (RIR 2),      Sidelying shoulder external rotation 3# - 10 reps, 11 reps (RIR 2)     Standing closed chain shoulder extension 1 min  Holding doorjamb     Therapeutic Exercise/Home Exercise Program:   35 minutes  Pt inst in role of PT, prognosis, plan of care, use of CP/HP, activity modification, and benefits of therapy. HEP has been established, See above, pt given handout(s) of new exercises. Updated HEP given to pt today, reviewed with patient at end of session. See HEP exercises below. Provided RTB  *Reassessment performed today, see above. Access Code: [email or web address]  URL: [email or web address]  Date: 07/26/2021  Prepared by: Christiane Huerta    Exercises  Seated Lumbar Flexion Stretch - 1-2 x daily - 5-7 x weekly - 1-2 sets - 10 reps - 5-10 hold  Supine Figure 4 Piriformis Stretch - 1 x daily - 5-7 x weekly - 2-4 sets - 5-8 reps - 5-10 seconds hold  Upper Back Extension Off Table - 1 x daily - 2-3 x weekly - 2-3 sets - 8-12 reps - 2 seconds hold  Standing Hip Hinge with Dowel - 1 x daily - 2-3 x weekly - 2-3 sets - 8-12 reps  Dumbbell Squat at Shoulders - 1 x daily - 2-3 x weekly - 3 sets - 10 reps  Standing Diagonal Chops with Medicine Ball - 1 x daily - 2-3 x weekly - 2-3 sets - 8-12 reps          Access Code: [email or web address]  URL: [email or web address]  Date: 08/30/2021  Prepared by: Doug Wray    Exercises  Shoulder External Rotation and Scapular Retraction with Resistance - 1 x daily - 7 x weekly - 3 sets - 10 reps  Standing Shoulder Horizontal Abduction with Resistance - 1 x daily - 4-5 x weekly - 3 sets - 8-12 reps  Shoulder Flexion Wall Slide with Towel - 1 x daily - 5-7 x weekly - 1-2 sets - 1-2 minute(s)  Shoulder Scaption Wall Slide with Towel - 1 x daily - 5-7 x weekly - 1-2 sets - 1-2 mintue(s)  Shoulder Abduction with Dumbbells - Palms Down - 1 x daily - 5-7 x weekly - 2-3 sets - 6-10 reps        Therapeutic Activity:  0 minutes     Gait: 0 minutes    Neuromuscular Re-Education:  0 minutes      Canalith Repositioning Procedure:      Manual Therapy:  10 minutes   Neutral Alignment Today. Focus of manual tx shifted to Left Shoulder   Joint mobs GR III, IV : posterior , inferior ; distraction   PROM to shoulder flexion, Abd, IR, ER   Gentle manual distraction of shoulder with MFR unwinding. Improved ROM at end of session    Lumbar rotation with overpressure - both directions. Slightly decreased ROM with rotation to R. Unilateral PA mobs to lumbar spine - L1-5 bilaterally      Manual traction   L hip inferior mobilization + passive stretching into hip flexion  L hip lateral mobilization + passive stretching into hip IR  PNF agonist reversal L hip flexion/extension  Lumbar lateral glides, hips to L w/overpressure from therapist  MET to promote pelvic alignment/ stabilization in supine, sidelying.    Long axis distraction Modalities: 0 minutes  DC Mechanical Txn. Progress to core and pelvic strength exercises. Mechanical lumbar traction, 15 minutes, Blue txn belts, 45 sec on/15 sec off, 57 kg pull on/28 kg pull off, pt LEs elevated on stool  Pt reported no increase in pain during activity. Felt stronger pull with blue belts. ASSESSMENT:    Pt demonstrates moderate to severe L shoulder mobility deficits, especially with ER and abduction. His presentation is consistent with a labral and/or rotator cuff tear. These mobility deficits limit the functional use of his shoulder and cause him shoulder pain on a regular basis. Pt's doctor would like to continue conservative treatment with PT to determine what level of improvement pt can achieve with his L shoulder. Pt will require further skilled PT to address these deficits so that he can return to his desired functional activities. Goals:   Patient stated goal:  \"I would just like to be able to have less pain and more movement\" - 40% progress as of 07/19/21  [x]? Progressing: []? Met: []? Not Met: []? Adjusted     Therapist goals for Patient:   Short Term Goals: To be achieved in: 4 weeks  1. Independent in HEP and progression per patient tolerance, in order to prevent re-injury. []? Progressing: [x]? Met: []? Not Met: []? Adjusted  2. Patient's will have a decrease in pain to 4/10 on average to facilitate improvement in movement, function, and ADLs as indicated by Functional Deficits. []? Progressing: [x]? Met: []? Not Met: []? Adjusted     Long Term Goals: To be achieved in: 8 weeks  1. Disability index score of 40% or less for the GISSELLE to assist with improving functional mobility and capacity. [x]? Progressing: []? Met: []? Not Met: []? Adjusted  2. Patient will demonstrate increased lumbar AROM to 100% WNL with no LE symptoms. [x]? Progressing: []? Met: []? Not Met: []? Adjusted  3.  Patient will demonstrate an increase in NM recruitment/activation and overall GH improving balance, coordination, kinesthetic sense, posture, motor skill, proprioception and motor activation to allow for proper function  with self care and ADLs  [] (00821) Provided training and instruction to the patient for proper core and proximal hip recruitment and positioning with ambulation re-education     Home Exercise Program:    [x] (31636) Reviewed/Progressed HEP activities related to strengthening, flexibility, endurance, ROM of core, proximal hip and LE for functional self-care, mobility, lifting and ambulation   [] (59372) Reviewed/Progressed HEP activities related to improving balance, coordination, kinesthetic sense, posture, motor skill, proprioception of core, proximal hip and LE for self care, mobility, lifting, and ambulation      Manual Treatments:  PROM / STM / Oscillations-Mobs:  G-I, II, III, IV (PA's, Inf., Post.)  [x] (21124) Provided manual therapy to mobilize proximal hip and LS spine soft tissue/joints for the purpose of modulating pain, promoting relaxation,  increasing ROM, reducing/eliminating soft tissue swelling/inflammation/restriction, improving soft tissue extensibility and allowing for proper ROM for normal function with self care, mobility, lifting and ambulation. Modalities:       Charges:  Timed Code Treatment Minutes: 45   Total Treatment Minutes: 45         [] EVAL  [x] IL(30955) x  2  [] IONTO  [] NMR (99468) x     [] VASO  [x] Manual (87404) x 1     [] Other:  [] TA x     [] Gait x   [] Mech Traction (57911)  [] ES(attended) (18837)     [] ES (un) (96664):       Electronically signed by: Francisco Salomon PT, DPT, [facility] #[id number]        Note: If patient does not return for scheduled/ recommended follow up visits, this note will serve as a discharge from care along with most recent update on progress.

## 2021-09-03 ENCOUNTER — HOSPITAL ENCOUNTER (OUTPATIENT)
Dept: PHYSICAL THERAPY | Age: 41
Setting detail: THERAPIES SERIES
Discharge: HOME | End: 2021-09-03
Payer: OTHER GOVERNMENT

## 2021-09-03 PROCEDURE — 97110 THERAPEUTIC EXERCISES: CPT

## 2021-09-03 PROCEDURE — 97140 MANUAL THERAPY 1/> REGIONS: CPT

## 2021-09-03 NOTE — FLOWSHEET NOTE
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.42)  · Treatment Diagnosis: low back pain with sciatica, L shoulder pain with mobility deficits    Insurance/Certification information:  PT Insurance Information: , pre-auth required, $21 co-pay    Physician Information:  Referring Practitioner: Meka Jones DO    Plan of care signed :  []  Yes  [x] No   []  Cosign [x]  Fax 21     Date of Patient follow up with Physician:     Is this a Progress Report:     []  Yes  [x]  No      If Yes:  Date Range for reporting period:  Beginning   Ending        Progress report will be due (10 Rx or 30 days whichever is less):        Recertification will be due (POC Duration  / 90 days whichever is less): 8 visits      Visit # Insurance Allowable Auth Required   ,  for new POC 15 additional [email or web address] authorized [x]  Yes []  No        Functional Scale: GISSELLE      Date 21  Score: 27/50 = 54% disability  24/ 50 = 48%  26/50 = 52%   24/50 = 48%    Functional Scale: SPADI   21   Date 21      Score: 57/130 = 44% disability 67/130 = 52% disability 62/130 = 48% disability      Latex Allergy:  [x]NO      []YES  Preferred Language for Healthcare:   [x]English       []other:    RESTRICTIONS/PRECAUTIONS: previous hx of laminectomy     SUBJECTIVE:    Pt reports no new complaints, notes that he still has L shoulder pain with reaching. He also has sharp pain in the shoulder when rolling over in bed.      * sign for shoulder: picking up weighted items, horizontal abduction, abduction with arm at side, reaching overhead, especially with weighted items, putting hand behind his back    *sign for lower back: He reports that the shooting pain down his LEs is reproduced with bending forward to tie his shoes, turning/rotating to reach for items, and picking up items off of the ground    Pain level:  3/10 in L shoulder current    Plan Moving Forward/ For next visit:    Progress to standing trunk strengthening activities as tolerated.  Progress Left Shoulder AROM and Strength Ex          OBJECTIVE:     Exercises/Interventions:     Exercises in bold performed in department today. Items not bolded are carried forward from prior visits for continuity of the record.   Exercise/Equipment Resistance/Repetitions HEP Other comments   Sidelying sciatic nerve glide in R sidelying   2 min []    Supine sciatic nerve glide  1 min 15 sec [x]    Prone press-up   2 min; 1 min each with LE pointed directionally [x]    Bridging with trunk rotation 3 x 8 reps  2x20 [x] UEs extended, holding ball    Standing hip hinge 12 reps     Bridge with sustained abd  RTB 3x15     Bridge with sustained ADD (Ball) 2x20     Lumbar roll stretch   1 min in L sidelying [x] Reviewed for HEP   Lumbar lateral glides   3 x 20-25 reps hips to L []    Recumbent bike   3 min, RPE 4-6/10 []    Elliptical  5 min     SLR with contralateral arm extended  TA activation + supine marching   1 x 10 knees to chest, 2 x 10 holding 8# DB  2 x 12 _    Seated sciatic nerve glide  2 x 1 min _     Single knee to chest    5 x 15 sec hold bilaterally _ Using towel to pull knee towards chest   Lumbar rotation dynamic stretch    1 min 30 sec _    _Side plank   6 reps each side _ HOLD until L shoulder pain improves   Standing lumbar flexion 1 min  Walking hands down legs, increased pain with activity   Seated lumbar flexion  2 min _ In pain-free ROM   Forward lunge  2 x 10, holding 9# in L hand _ L LE only   Goblet squat with slow eccentric   2 x 12, holding 10# weight _    Figure 4 stretch with lumbar rotation  2 min _ Symptoms down L LE after 1 min 30 sec   Standing Hip Abduction  3x10   9#     Lateral step out/ squats with Glute squeeze 6 length of // bars  9#     Standing Hip Extension Taps    3x10  9# _    Mule Kick  3x10 9#     Suitcase carry 2 sets of 4 x 50 ft trips, 30# in bucket     Standing diagonal chop 2 x 10, 7.5#     Standing Pallof Press 2 x 12, 5#  Using cable machine   Prone trunk extension 2 x 12  Partial ROM, no assist of hands   Pelvic/ Hip Flexibility  stretches NV     Standing lumbar extension 10 reps, 10 reps with hands on wall  Pt complaints of tightness/reduced ROM in lower back   SHOULDER COMPLEX      AAROM in supine x10     Sleeper stretch x10 5 sec hold     Mid Row  25# - 2 x 10   2x15  Using cable machine   Row Row  2x15     Pulley 2 min flexion     Shoulder abduction isometric 3 x 1 min     Wall slides 2 x 1 min flexion, 1 min scaption     Bent over row 3 x 8-12 reps, 25# in bucke  L UE only, using cable machine   Sidelying shoulder abduction AMRAP with 2# - 15 reps, 5 RIR      Standing shoulder IR 10# - 17 reps, 12.5# - 2 x 8-12  On cable machine   Standing shoulder ER 5# - 21 reps, 7.5# - 2 x 10-15  On cable machine   's carry 3 rounds X 1 trip each arm 5# DB     Push up to table 2 x 8     OH Press 3 x 10, 7# on stick     Landmine press  6# on stick - 24 reps (3 RIR), 9# on stick - 2 x 12     Shoulder horizontal abduction 2.5# - 2x8, 5# - 8 reps  Using cable machine   Sidelying shoulder flexion 3# - 7 reps (RIR 2),      Sidelying shoulder external rotation 3# - 10 reps, 11 reps (RIR 2)     Standing closed chain shoulder extension 1 min 30 sec  Holding doorjamb     Therapeutic Exercise/Home Exercise Program:   25 minutes  Pt inst in role of PT, prognosis, plan of care, use of CP/HP, activity modification, and benefits of therapy. HEP has been established, See above, pt given handout(s) of new exercises. Updated HEP given to pt today, reviewed with patient at end of session. See HEP exercises below. Provided RTB  *Reassessment performed today, see above.     Access Code: [email or web address]  URL: [email or web address]  Date: 07/26/2021  Prepared by: Coreyis Lamprey    Exercises  Seated Lumbar Flexion Stretch - 1-2 x daily - 5-7 x weekly - 1-2 sets - 10 reps - 5-10 hold  Supine Figure 4 Piriformis Stretch - 1 x daily - 5-7 x weekly - 2-4 sets - 5-8 reps - 5-10 seconds hold  Upper Back Extension Off Table - 1 x daily - 2-3 x weekly - 2-3 sets - 8-12 reps - 2 seconds hold  Standing Hip Hinge with Dowel - 1 x daily - 2-3 x weekly - 2-3 sets - 8-12 reps  Dumbbell Squat at Shoulders - 1 x daily - 2-3 x weekly - 3 sets - 10 reps  Standing Diagonal Chops with Medicine Ball - 1 x daily - 2-3 x weekly - 2-3 sets - 8-12 reps          Access Code: [email or web address]  URL: [email or web address]  Date: 08/30/2021  Prepared by: Coreyis Lamprey    Exercises  Shoulder External Rotation and Scapular Retraction with Resistance - 1 x daily - 7 x weekly - 3 sets - 10 reps  Standing Shoulder Horizontal Abduction with Resistance - 1 x daily - 4-5 x weekly - 3 sets - 8-12 reps  Shoulder Flexion Wall Slide with Towel - 1 x daily - 5-7 x weekly - 1-2 sets - 1-2 minute(s)  Shoulder Scaption Wall Slide with Towel - 1 x daily - 5-7 x weekly - 1-2 sets - 1-2 mintue(s)  Shoulder Abduction with Dumbbells - Palms Down - 1 x daily - 5-7 x weekly - 2-3 sets - 6-10 reps        Therapeutic Activity:  0 minutes     Gait: 0 minutes    Neuromuscular Re-Education:  0 minutes      Canalith Repositioning Procedure:      Manual Therapy:  20 minutes   Neutral Alignment Today. Focus of manual tx shifted to Left Shoulder   Joint mobs GR III, IV : posterior , inferior ; distraction   PROM to shoulder flexion, Abd, IR, ER   Gentle manual distraction of shoulder with MFR unwinding. Improved ROM at end of session    Lumbar rotation with overpressure - both directions. Slightly decreased ROM with rotation to R.    Unilateral PA mobs to lumbar spine - L1-5 bilaterally      Manual traction   L hip inferior mobilization + passive stretching into hip flexion  L hip lateral mobilization + passive stretching into hip IR  PNF agonist reversal L hip flexion/extension  Lumbar lateral glides, hips to L w/overpressure from therapist  MET to promote pelvic alignment/ stabilization in supine, sidelying. Long axis distraction       Modalities: 0 minutes  DC Mechanical Txn. Progress to core and pelvic strength exercises. Mechanical lumbar traction, 15 minutes, Blue txn belts, 45 sec on/15 sec off, 57 kg pull on/28 kg pull off, pt LEs elevated on stool  Pt reported no increase in pain during activity. Felt stronger pull with blue belts. ASSESSMENT:    Pt was fatigued with a progression of his strengthening exercises but had minimal pain with performing activities. His L shoulder pain-free AROM improved following joint mobilization and passive stretching. He will continue to be progressed as tolerated to regain full function of his L UE. Goals:   Patient stated goal:  \"I would just like to be able to have less pain and more movement\" - 40% progress as of 07/19/21  [x]? Progressing: []? Met: []? Not Met: []? Adjusted     Therapist goals for Patient:   Short Term Goals: To be achieved in: 4 weeks  1. Independent in HEP and progression per patient tolerance, in order to prevent re-injury. []? Progressing: [x]? Met: []? Not Met: []? Adjusted  2. Patient's will have a decrease in pain to 4/10 on average to facilitate improvement in movement, function, and ADLs as indicated by Functional Deficits. []? Progressing: [x]? Met: []? Not Met: []? Adjusted     Long Term Goals: To be achieved in: 8 weeks  1. Disability index score of 40% or less for the GISSELLE to assist with improving functional mobility and capacity. [x]? Progressing: []? Met: []? Not Met: []? Adjusted  2. Patient will demonstrate increased lumbar AROM to 100% WNL with no LE symptoms. [x]? Progressing: []? Met: []? Not Met: []? Adjusted  3.  Patient will demonstrate an increase in NM recruitment/activation and overall GH and scapular strength to 4+/5 or WNL for proper functional mobility as indicated by patients Functional Deficits. [x]? Progressing: nearing []? Met: []? Not Met: []? Adjusted  4. Patient will complete a 20 minute strength training workout with minimal to no low back pain. 0% progress as of 07/19/21  []? Progressing: []? Met: [x]? Not Met: []? Adjusted    Overall Progression Towards Functional goals/ Treatment Progress Update:  [] Patient is progressing as expected towards functional goals listed. [x] Progression is slowed due to complexities/Impairments listed. [] Progression has been slowed due to co-morbidities. [] Plan just implemented, too soon to assess goals progression <30days   [] Goals require adjustment due to lack of progress  [] Patient is not progressing as expected and requires additional follow up with physician  [] Other    Prognosis for POC: [x] Good [] Fair  [] Poor    Patient requires continued skilled intervention: [x] Yes  [] No    Treatment/Activity Tolerance:  [x] Patient able to complete treatment  [] Patient limited by fatigue  [] Patient limited by pain    [] Patient limited by other medical complications  [] Other:         PLAN: See eval  [x] Continue per plan of care [] Alter current plan (see comments above)  [] Plan of care initiated [] Hold pending MD visit [] Discharge        Therapeutic Exercise and NMR EXR  [x] (91569) Provided verbal/tactile cueing for activities related to strengthening, flexibility, endurance, ROM  for improvements in proximal strength and core control with self care, mobility, lifting and ambulation.  [] (51915) Provided verbal/tactile cueing for activities related to improving balance, coordination, kinesthetic sense, posture, motor skill, proprioception  to assist with core control in self care, mobility, lifting, and ambulation.      Therapeutic Activities and Gait:    [] (04563 or 48279) Provided verbal/tactile cueing for activities related to improving balance, coordination, kinesthetic sense, posture, motor skill, proprioception and motor activation to allow for proper function  with self care and ADLs  [] (55393) Provided training and instruction to the patient for proper core and proximal hip recruitment and positioning with ambulation re-education     Home Exercise Program:    [x] (86853) Reviewed/Progressed HEP activities related to strengthening, flexibility, endurance, ROM of core, proximal hip and LE for functional self-care, mobility, lifting and ambulation   [] (78498) Reviewed/Progressed HEP activities related to improving balance, coordination, kinesthetic sense, posture, motor skill, proprioception of core, proximal hip and LE for self care, mobility, lifting, and ambulation      Manual Treatments:  PROM / STM / Oscillations-Mobs:  G-I, II, III, IV (PA's, Inf., Post.)  [x] (58342) Provided manual therapy to mobilize proximal hip and LS spine soft tissue/joints for the purpose of modulating pain, promoting relaxation,  increasing ROM, reducing/eliminating soft tissue swelling/inflammation/restriction, improving soft tissue extensibility and allowing for proper ROM for normal function with self care, mobility, lifting and ambulation. Modalities:       Charges:  Timed Code Treatment Minutes: 45   Total Treatment Minutes: 45         [] EVAL  [x] DQ(53528) x  2  [] IONTO  [] NMR (36209) x     [] VASO  [x] Manual (09014) x 1     [] Other:  [] TA x     [] Gait x   [] Mech Traction (81352)  [] ES(attended) (30862)     [] ES (un) (80021):       Electronically signed by: Pauline Singh PT, DPT, [address] #[id number]        Note: If patient does not return for scheduled/ recommended follow up visits, this note will serve as a discharge from care along with most recent update on progress.

## 2021-09-03 NOTE — PROGRESS NOTES
Physical Therapy  9/3/21    [facility] called and left a message for Morris Rodriguez stating the patient was approved for 15 visits.

## 2021-09-10 ENCOUNTER — HOSPITAL ENCOUNTER (OUTPATIENT)
Dept: PHYSICAL THERAPY | Age: 41
Setting detail: THERAPIES SERIES
Discharge: HOME | End: 2021-09-10
Payer: OTHER GOVERNMENT

## 2021-09-10 PROCEDURE — 97140 MANUAL THERAPY 1/> REGIONS: CPT

## 2021-09-10 PROCEDURE — 97110 THERAPEUTIC EXERCISES: CPT

## 2021-09-10 NOTE — FLOWSHEET NOTE
Physical Therapy Daily Treatment Note      Date:  9/10/2021    Patient Name:  Chary Mittal    :  1980  MRN: [id number]      Medical/Treatment Diagnosis Information:  · Diagnosis: Acute pain of left shoulder (M25.512), chronic bilateral low back pain with bilateral sciatica (M54.42)  · Treatment Diagnosis: low back pain with sciatica, L shoulder pain with mobility deficits    Insurance/Certification information:  PT Insurance Information: , pre-auth required, $21 co-pay    Physician Information:  Referring Practitioner: Maria L Parker DO    Plan of care signed :  []  Yes  [x] No   []  Cosign [x]  Fax 21     Date of Patient follow up with Physician:     Is this a Progress Report:     []  Yes  [x]  No      If Yes:  Date Range for reporting period:  Beginning 21  Ending        Progress report will be due (10 Rx or 30 days whichever is less):  3/63/02      Recertification will be due (POC Duration  / 90 days whichever is less): 8 visits      Visit # Insurance Allowable Auth Required   ,  for new POC 15 additional authorized on 9/3/21; 16 authorized [x]  Yes []  No        Functional Scale: GISSELLE      Date 21  Score: 27/50 = 54% disability  24/ 50 = 48%  26/50 = 52%   24/50 = 48%    Functional Scale: SPADI   21   Date 21      Score: 57/130 = 44% disability 67/130 = 52% disability 62/130 = 48% disability      Latex Allergy:  [x]NO      []YES  Preferred Language for Healthcare:   [x]English       []other:    RESTRICTIONS/PRECAUTIONS: previous hx of laminectomy     SUBJECTIVE:    Pt reports that his shoulder is feeling \"better\" today, notes that he is having less overall pain and has not had any episodes of sharp pain since his last visit earlier this week.      * sign for shoulder: picking up weighted items, horizontal abduction, abduction with arm at side, reaching overhead, especially with weighted items, putting hand behind his back    *sign for lower back: He reports that the shooting pain down his LEs is reproduced with bending forward to tie his shoes, turning/rotating to reach for items, and picking up items off of the ground    Pain level:  3/10 in L shoulder current    Plan Moving Forward/ For next visit:    Joint mobilizations/PNF/passive stretching to improve pain-free L shoulder ROM   Progress Left Shoulder AROM and Strength Ex          OBJECTIVE:     Exercises/Interventions:     Exercises in bold performed in department today. Items not bolded are carried forward from prior visits for continuity of the record.   Exercise/Equipment Resistance/Repetitions HEP Other comments   Sidelying sciatic nerve glide in R sidelying   2 min []    Supine sciatic nerve glide  1 min 15 sec [x]    Prone press-up   2 min; 1 min each with LE pointed directionally [x]    Bridging with trunk rotation 3 x 8 reps  2x20 [x] UEs extended, holding ball    Standing hip hinge 12 reps     Bridge with sustained abd  RTB 3x15     Bridge with sustained ADD (Ball) 2x20     Lumbar roll stretch   1 min in L sidelying [x] Reviewed for HEP   Lumbar lateral glides   3 x 20-25 reps hips to L []    Recumbent bike   3 min, RPE 4-6/10 []    Elliptical  5 min     SLR with contralateral arm extended  TA activation + supine marching   1 x 10 knees to chest, 2 x 10 holding 8# DB  2 x 12 _    Seated sciatic nerve glide  2 x 1 min _     Single knee to chest    5 x 15 sec hold bilaterally _ Using towel to pull knee towards chest   Lumbar rotation dynamic stretch    1 min 30 sec _    _Side plank   6 reps each side _ HOLD until L shoulder pain improves   Standing lumbar flexion 1 min  Walking hands down legs, increased pain with activity   Seated lumbar flexion  2 min _ In pain-free ROM   Forward lunge  2 x 10, holding 9# in L hand _ L LE only   Goblet squat with slow eccentric   2 x 12, holding 10# weight _    Figure 4 stretch with lumbar rotation  2 min _ Symptoms down L LE after 1 min 30 sec   Standing Hip Abduction  3x10   9#     Lateral step out/ squats with Glute squeeze 6 length of // bars  9#     Standing Hip Extension Taps    3x10  9# _    Mule Kick  3x10 9#     Suitcase carry 2 sets of 4 x 50 ft trips, 30# in bucket     Standing diagonal chop 2 x 10, 7.5#     Standing Pallof Press 2 x 12, 5#  Using cable machine   Prone trunk extension 2 x 12  Partial ROM, no assist of hands   Pelvic/ Hip Flexibility  stretches NV     Standing lumbar extension 10 reps, 10 reps with hands on wall  Pt complaints of tightness/reduced ROM in lower back   SHOULDER COMPLEX      AAROM in supine x10     Sleeper stretch x10 5 sec hold     Mid Row  25# - 2 x 10   2x15  Using cable machine   Row Row  2x15     Pulley 1 min flexion, 1 min abduction     Shoulder abduction isometric 3 x 1 min     Wall slides 2 x 1 min flexion, 1 min scaption     Bent over row 3 x 8-12 reps, 25# in bucke  L UE only, using cable machine   Sidelying shoulder abduction AMRAP with 2# - 15 reps, 5 RIR      Standing shoulder IR 10# - 17 reps, 12.5# - 2 x 8-12  On cable machine   Standing shoulder ER 5# - 21 reps, 7.5# - 2 x 10-15  On cable machine   's carry 3 rounds X 1 trip each arm 5# DB     Push up to table 10 reps     OH Press 3 x 10, 7# on stick  Attempted, held due to catching in L shoulder   Landmine press  6# on stick - 24 reps (3 RIR), 9# on stick - 2 x 12     Shoulder horizontal abduction 2.5# - 2x8, 5# - 8 reps  Using cable machine   Sidelying shoulder flexion 3# - 7 reps (RIR 2),      Shoulder IR and extension AAROM 1 min, pulling towel behind back     Supine W's - moving into ER 1 min     Shoulder IR isometrics in 90/90 position 1 min     Sidelying shoulder external rotation 3# - 10 reps, 11 reps (RIR 2)     Standing closed chain shoulder extension 1 min 30 sec  Holding doorjamb     Therapeutic Exercise/Home Exercise Program:   20 minutes  Pt inst in role of PT, prognosis, plan of care, use of CP/HP, activity modification, and benefits of therapy. HEP has been established, See above, pt given handout(s) of new exercises. Updated HEP given to pt today, reviewed with patient at end of session. See HEP exercises below. Provided RTB  *Reassessment performed today, see above. Access Code: [email or web address]  URL: [employer]/  Date: 07/26/2021  Prepared by: David Hernandez    Exercises  Seated Lumbar Flexion Stretch - 1-2 x daily - 5-7 x weekly - 1-2 sets - 10 reps - 5-10 hold  Supine Figure 4 Piriformis Stretch - 1 x daily - 5-7 x weekly - 2-4 sets - 5-8 reps - 5-10 seconds hold  Upper Back Extension Off Table - 1 x daily - 2-3 x weekly - 2-3 sets - 8-12 reps - 2 seconds hold  Standing Hip Hinge with Dowel - 1 x daily - 2-3 x weekly - 2-3 sets - 8-12 reps  Dumbbell Squat at Shoulders - 1 x daily - 2-3 x weekly - 3 sets - 10 reps  Standing Diagonal Chops with Medicine Ball - 1 x daily - 2-3 x weekly - 2-3 sets - 8-12 reps          Access Code: [email or web address]  URL: [employer]/  Date: 09/10/2021  Prepared by: David eHrnandez    Exercises  Shoulder External Rotation and Scapular Retraction with Resistance - 1 x daily - 7 x weekly - 3 sets - 10 reps  Shoulder Flexion Wall Slide with Towel - 1 x daily - 5-7 x weekly - 1-2 sets - 1-2 minute(s)  Shoulder Scaption Wall Slide with Towel - 1 x daily - 5-7 x weekly - 1-2 sets - 1-2 mintue(s)  Shoulder Abduction with Dumbbells - Palms Down - 1 x daily - 5-7 x weekly - 2-3 sets - 6-10 reps  Push Up on Table - 1 x daily - 5-7 x weekly - 2-3 sets - 6-10 reps  Supine W - patient took video of exercise           Therapeutic Activity:  0 minutes     Gait: 0 minutes    Neuromuscular Re-Education:  0 minutes      Canalith Repositioning Procedure:      Manual Therapy:  10 minutes   Neutral Alignment Today.   Focus of manual tx shifted to Left Shoulder   Joint mobs GR III, IV : posterior , inferior ; distraction   PROM to shoulder flexion, Abd, IR, ER   Gentle manual distraction of shoulder with MFR unwinding. Improved ROM at end of session    Lumbar rotation with overpressure - both directions. Slightly decreased ROM with rotation to R. Unilateral PA mobs to lumbar spine - L1-5 bilaterally      Manual traction   L hip inferior mobilization + passive stretching into hip flexion  L hip lateral mobilization + passive stretching into hip IR  PNF agonist reversal L hip flexion/extension  Lumbar lateral glides, hips to L w/overpressure from therapist  MET to promote pelvic alignment/ stabilization in supine, sidelying. Long axis distraction       Modalities: 0 minutes  DC Mechanical Txn. Progress to core and pelvic strength exercises. Mechanical lumbar traction, 15 minutes, Blue txn belts, 45 sec on/15 sec off, 57 kg pull on/28 kg pull off, pt LEs elevated on stool  Pt reported no increase in pain during activity. Felt stronger pull with blue belts. ASSESSMENT:    Pt continues to have the most discomfort and the most significant strength deficits with L shoulder ER. He was given home exercise to address his mobility and strength deficits in this area. Overall, pt's L shoulder strength and function are improving steadily. Goals:   Patient stated goal:  \"I would just like to be able to have less pain and more movement\" - 40% progress as of 07/19/21  [x]? Progressing: []? Met: []? Not Met: []? Adjusted     Therapist goals for Patient:   Short Term Goals: To be achieved in: 4 weeks  1. Independent in HEP and progression per patient tolerance, in order to prevent re-injury. []? Progressing: [x]? Met: []? Not Met: []? Adjusted  2. Patient's will have a decrease in pain to 4/10 on average to facilitate improvement in movement, function, and ADLs as indicated by Functional Deficits. []? Progressing: [x]? Met: []? Not Met: []? Adjusted     Long Term Goals: To be achieved in: 8 weeks  1.  Disability index score of 40% or less for the GISSELLE to assist with improving functional mobility and capacity. [x]? Progressing: []? Met: []? Not Met: []? Adjusted  2. Patient will demonstrate increased lumbar AROM to 100% WNL with no LE symptoms. [x]? Progressing: []? Met: []? Not Met: []? Adjusted  3. Patient will demonstrate an increase in NM recruitment/activation and overall GH and scapular strength to 4+/5 or WNL for proper functional mobility as indicated by patients Functional Deficits. [x]? Progressing: nearing []? Met: []? Not Met: []? Adjusted  4. Patient will complete a 20 minute strength training workout with minimal to no low back pain. 0% progress as of 07/19/21  []? Progressing: []? Met: [x]? Not Met: []? Adjusted    Overall Progression Towards Functional goals/ Treatment Progress Update:  [] Patient is progressing as expected towards functional goals listed. [x] Progression is slowed due to complexities/Impairments listed. [] Progression has been slowed due to co-morbidities.   [] Plan just implemented, too soon to assess goals progression <30days   [] Goals require adjustment due to lack of progress  [] Patient is not progressing as expected and requires additional follow up with physician  [] Other    Prognosis for POC: [x] Good [] Fair  [] Poor    Patient requires continued skilled intervention: [x] Yes  [] No    Treatment/Activity Tolerance:  [x] Patient able to complete treatment  [] Patient limited by fatigue  [] Patient limited by pain    [] Patient limited by other medical complications  [] Other:         PLAN: See eval  [x] Continue per plan of care [] Alter current plan (see comments above)  [] Plan of care initiated [] Hold pending MD visit [] Discharge        Therapeutic Exercise and NMR EXR  [x] (19901) Provided verbal/tactile cueing for activities related to strengthening, flexibility, endurance, ROM  for improvements in proximal strength and core control with self care, mobility, lifting and ambulation.  [] (72497) Provided verbal/tactile cueing for activities related to improving balance, coordination, kinesthetic sense, posture, motor skill, proprioception  to assist with core control in self care, mobility, lifting, and ambulation. Therapeutic Activities and Gait:    [] (02128 or 13648) Provided verbal/tactile cueing for activities related to improving balance, coordination, kinesthetic sense, posture, motor skill, proprioception and motor activation to allow for proper function  with self care and ADLs  [] (32641) Provided training and instruction to the patient for proper core and proximal hip recruitment and positioning with ambulation re-education     Home Exercise Program:    [x] (99717) Reviewed/Progressed HEP activities related to strengthening, flexibility, endurance, ROM of core, proximal hip and LE for functional self-care, mobility, lifting and ambulation   [] (30738) Reviewed/Progressed HEP activities related to improving balance, coordination, kinesthetic sense, posture, motor skill, proprioception of core, proximal hip and LE for self care, mobility, lifting, and ambulation      Manual Treatments:  PROM / STM / Oscillations-Mobs:  G-I, II, III, IV (PA's, Inf., Post.)  [x] (14467) Provided manual therapy to mobilize proximal hip and LS spine soft tissue/joints for the purpose of modulating pain, promoting relaxation,  increasing ROM, reducing/eliminating soft tissue swelling/inflammation/restriction, improving soft tissue extensibility and allowing for proper ROM for normal function with self care, mobility, lifting and ambulation.      Modalities:       Charges:  Timed Code Treatment Minutes: 30   Total Treatment Minutes: 30         [] EVAL  [x] IU(02005) x  1  [] IONTO  [] NMR (01404) x     [] VASO  [x] Manual (52809) x 1     [] Other:  [] TA x     [] Gait x   [] [facility] (52981[facility]  [] ES(attended) (47534)     [] ES (un) (41102):       Electronically signed by: Greer Sevilla PT, DPT, Lissette Johnson #[id number]        Note: If patient does not return for scheduled/ recommended follow up visits, this note will serve as a discharge from care along with most recent update on progress.

## 2021-09-13 ENCOUNTER — HOSPITAL ENCOUNTER (OUTPATIENT)
Dept: PHYSICAL THERAPY | Age: 41
Setting detail: THERAPIES SERIES
Discharge: HOME | End: 2021-09-13
Payer: OTHER GOVERNMENT

## 2021-09-13 PROCEDURE — 97140 MANUAL THERAPY 1/> REGIONS: CPT

## 2021-09-13 PROCEDURE — 97110 THERAPEUTIC EXERCISES: CPT

## 2021-09-13 NOTE — FLOWSHEET NOTE
Physical Therapy Daily Treatment Note      Date:  2021    Patient Name:  Aquiles Matias    :  1980  MRN: [id number]      Medical/Treatment Diagnosis Information:  · Diagnosis: Acute pain of left shoulder (M25.512), chronic bilateral low back pain with bilateral sciatica (M54.42)  · Treatment Diagnosis: low back pain with sciatica, L shoulder pain with mobility deficits    Insurance/Certification information:  PT Insurance Information: , pre-auth required, $21 co-pay    Physician Information:  Referring Practitioner: Arielle Hale DO    Plan of care signed :  []  Yes  [x] No   []  Cosign [x]  Fax 21     Date of Patient follow up with Physician:     Is this a Progress Report:     []  Yes  [x]  No      If Yes:  Date Range for reporting period:  Beginning 21  Ending        Progress report will be due (10 Rx or 30 days whichever is less):        Recertification will be due (POC Duration  / 90 days whichever is less): 8 visits      Visit # Insurance Allowable Auth Required   ,  for new POC; 19 visits total 15 additional authorized on 9/3/21; 16 authorized [x]  Yes []  No        Functional Scale: GISSELLE      Date 21  Score: 27/50 = 54% disability  24/ 50 = 48%  26/50 = 52%   24/50 = 48%    Functional Scale: SPADI   21   Date 21      Score: 57/130 = 44% disability 67/130 = 52% disability 62/130 = 48% disability      Latex Allergy:  [x]NO      []YES  Preferred Language for Healthcare:   [x]English       []other:    RESTRICTIONS/PRECAUTIONS: previous hx of laminectomy     SUBJECTIVE:    No new complaints, pt reports that his shoulder feels about the same as at his last visit.      * sign for shoulder: picking up weighted items, horizontal abduction, abduction with arm at side, reaching overhead, especially with weighted items, putting hand behind his back    *sign for lower back: He reports that the shooting pain down his LEs is reproduced with bending forward to tie his shoes, turning/rotating to reach for items, and picking up items off of the ground    Pain level:  3/10 in L shoulder currently    Plan Moving Forward/ For next visit:   Curt Aase mobilizations/PNF/passive stretching to improve pain-free L shoulder ROM   Progress Left Shoulder AROM and Strength Ex          OBJECTIVE:     Exercises/Interventions:     Exercises in bold performed in department today. Items not bolded are carried forward from prior visits for continuity of the record.   Exercise/Equipment Resistance/Repetitions HEP Other comments   Sidelying sciatic nerve glide in R sidelying   2 min []    Supine sciatic nerve glide  1 min 15 sec [x]    Prone press-up   2 min; 1 min each with LE pointed directionally [x]    Bridging with trunk rotation 3 x 8 reps  2x20 [x] UEs extended, holding ball    Standing hip hinge 12 reps     Bridge with sustained abd  RTB 3x15     Bridge with sustained ADD (Ball) 2x20     Lumbar roll stretch   1 min in L sidelying [x] Reviewed for HEP   Lumbar lateral glides   3 x 20-25 reps hips to L []    Recumbent bike   3 min, RPE 4-6/10 []    Elliptical  4 min  For warmup   SLR with contralateral arm extended  TA activation + supine marching   1 x 10 knees to chest, 2 x 10 holding 8# DB  2 x 12 _    Seated sciatic nerve glide  2 x 1 min _     Single knee to chest    5 x 15 sec hold bilaterally _ Using towel to pull knee towards chest   Lumbar rotation dynamic stretch    1 min 30 sec _    _Side plank   6 reps each side _ HOLD until L shoulder pain improves   Standing lumbar flexion 1 min  Walking hands down legs, increased pain with activity   Seated lumbar flexion  2 min _ In pain-free ROM   Forward lunge  2 x 10, holding 9# in L hand _ L LE only   Goblet squat with slow eccentric   2 x 12, holding 10# weight _    Figure 4 stretch with lumbar rotation  2 min _ Symptoms down L LE after 1 min 30 sec   Standing Hip Abduction  3x10   9# Lateral step out/ squats with Glute squeeze 6 length of // bars  9#     Standing Hip Extension Taps    3x10  9# _    Mule Kick  3x10 9#     Suitcase carry 2 sets of 4 x 50 ft trips, 30# in bucket     Standing diagonal chop 2 x 10, 7.5#     Standing Pallof Press 2 x 12, 5#  Using cable machine   Prone trunk extension 2 x 12  Partial ROM, no assist of hands   Pelvic/ Hip Flexibility  stretches NV     Standing lumbar extension 10 reps, 10 reps with hands on wall  Pt complaints of tightness/reduced ROM in lower back   SHOULDER COMPLEX      AAROM in supine x10     Sleeper stretch x10 5 sec hold     Mid Row  25# - 2 x 10   2x15  Using cable machine   Row Row  2x15     Scifit 2 x 1 min 30 sec, lvl 2. 5     Pulley 1 min flexion, 1 min abduction     Shoulder abduction isometric 3 x 1 min     Wall slides 2 x 1 min flexion, 1 min scaption     Bent over row 2 x 8-12 reps 20# in bucket  L UE only, using cable machine   Sidelying shoulder abduction AMRAP with 2# - 15 reps, 5 RIR      Standing shoulder IR 10# - 17 reps, 12.5# - 2 x 8-12  On cable machine   Standing shoulder ER 5# - 21 reps, 7.5# - 2 x 10-15  On cable machine   's carry 3 rounds X 1 trip each arm 5# DB     Modified push up  Push up to table 1 min - 9 reps; 1 min - 3-4 reps  10 reps     OH Press 2 x 8-12, 4# DB in L UE  3 x 10, 7# on stick  Attempted, held due to catching in L shoulder   Landmine press  6# on stick - 24 reps (3 RIR), 9# on stick - 2 x 12     Shoulder horizontal abduction 2.5# - 2x8, 5# - 8 reps  Using cable machine   Sidelying shoulder flexion 3# - 7 reps (RIR 2),      Shoulder IR and extension AAROM 1 min, pulling towel behind back     Supine W's - moving into ER 1 min     Shoulder IR isometrics in 90/90 position 1 min     Sidelying shoulder external rotation 3# - 10 reps, 11 reps (RIR 2)     Standing closed chain shoulder extension 1 min 30 sec  Holding doorjamb     Therapeutic Exercise/Home Exercise Program:   30 minutes  Pt inst in role of PT, prognosis, plan of care, use of CP/HP, activity modification, and benefits of therapy. HEP has been established, See above, pt given handout(s) of new exercises. Updated HEP given to pt today, reviewed with patient at end of session. See HEP exercises below. Provided RTB  *Reassessment performed today, see above. Access Code: [email or web address]  URL: [email or web address]  Date: 07/26/2021  Prepared by: Gari Goltz    Exercises  Seated Lumbar Flexion Stretch - 1-2 x daily - 5-7 x weekly - 1-2 sets - 10 reps - 5-10 hold  Supine Figure 4 Piriformis Stretch - 1 x daily - 5-7 x weekly - 2-4 sets - 5-8 reps - 5-10 seconds hold  Upper Back Extension Off Table - 1 x daily - 2-3 x weekly - 2-3 sets - 8-12 reps - 2 seconds hold  Standing Hip Hinge with Dowel - 1 x daily - 2-3 x weekly - 2-3 sets - 8-12 reps  Dumbbell Squat at Shoulders - 1 x daily - 2-3 x weekly - 3 sets - 10 reps  Standing Diagonal Chops with Medicine Ball - 1 x daily - 2-3 x weekly - 2-3 sets - 8-12 reps          Access Code: [email or web address]  URL: [email or web address]  Date: 09/10/2021  Prepared by: Gari Goltz    Exercises  Shoulder External Rotation and Scapular Retraction with Resistance - 1 x daily - 7 x weekly - 3 sets - 10 reps  Shoulder Flexion Wall Slide with Towel - 1 x daily - 5-7 x weekly - 1-2 sets - 1-2 minute(s)  Shoulder Scaption Wall Slide with Towel - 1 x daily - 5-7 x weekly - 1-2 sets - 1-2 mintue(s)  Shoulder Abduction with Dumbbells - Palms Down - 1 x daily - 5-7 x weekly - 2-3 sets - 6-10 reps  Push Up on Table - 1 x daily - 5-7 x weekly - 2-3 sets - 6-10 reps  Supine W - patient took video of exercise           Therapeutic Activity:  0 minutes     Gait: 0 minutes    Neuromuscular Re-Education:  0 minutes      Canalith Repositioning Procedure:      Manual Therapy:  20 minutes   Neutral Alignment Today.   Focus of manual tx shifted to Left Shoulder   Joint mobs GR III, IV : posterior , inferior ; distraction PROM to shoulder flexion, Abd, IR, ER  PNF - rhythmic stabilization with shoulder flexion, abduction, ER/IR  STM/gentle massage to L periscapular muscles and lateral shoulder   Gentle manual distraction of shoulder with MFR unwinding. Improved ROM at end of session    Lumbar rotation with overpressure - both directions. Slightly decreased ROM with rotation to R. Unilateral PA mobs to lumbar spine - L1-5 bilaterally      Manual traction   L hip inferior mobilization + passive stretching into hip flexion  L hip lateral mobilization + passive stretching into hip IR  PNF agonist reversal L hip flexion/extension  Lumbar lateral glides, hips to L w/overpressure from therapist  MET to promote pelvic alignment/ stabilization in supine, sidelying. Long axis distraction       Modalities: 0 minutes  DC Mechanical Txn. Progress to core and pelvic strength exercises. Mechanical lumbar traction, 15 minutes, Blue txn belts, 45 sec on/15 sec off, 57 kg pull on/28 kg pull off, pt LEs elevated on stool  Pt reported no increase in pain during activity. Felt stronger pull with blue belts. ASSESSMENT:    Pt able to perform overhead strengthening exercises with less pain than at previous visits. He continues to demonstrate moderate strength deficits in his L shoulder with higher level activities. He will continue to be progressed as tolerated. Goals:   Patient stated goal:  \"I would just like to be able to have less pain and more movement\" - 40% progress as of 07/19/21  [x]? Progressing: []? Met: []? Not Met: []? Adjusted     Therapist goals for Patient:   Short Term Goals: To be achieved in: 4 weeks  1. Independent in HEP and progression per patient tolerance, in order to prevent re-injury. []? Progressing: [x]? Met: []? Not Met: []? Adjusted  2. Patient's will have a decrease in pain to 4/10 on average to facilitate improvement in movement, function, and ADLs as indicated by Functional Deficits. []?  Progressing: endurance, ROM  for improvements in proximal strength and core control with self care, mobility, lifting and ambulation.  [] (24828) Provided verbal/tactile cueing for activities related to improving balance, coordination, kinesthetic sense, posture, motor skill, proprioception  to assist with core control in self care, mobility, lifting, and ambulation. Therapeutic Activities and Gait:    [] (00104 or 02481) Provided verbal/tactile cueing for activities related to improving balance, coordination, kinesthetic sense, posture, motor skill, proprioception and motor activation to allow for proper function  with self care and ADLs  [] (28590) Provided training and instruction to the patient for proper core and proximal hip recruitment and positioning with ambulation re-education     Home Exercise Program:    [x] (20418) Reviewed/Progressed HEP activities related to strengthening, flexibility, endurance, ROM of core, proximal hip and LE for functional self-care, mobility, lifting and ambulation   [] (06105) Reviewed/Progressed HEP activities related to improving balance, coordination, kinesthetic sense, posture, motor skill, proprioception of core, proximal hip and LE for self care, mobility, lifting, and ambulation      Manual Treatments:  PROM / STM / Oscillations-Mobs:  G-I, II, III, IV (PA's, Inf., Post.)  [x] (20837) Provided manual therapy to mobilize proximal hip and LS spine soft tissue/joints for the purpose of modulating pain, promoting relaxation,  increasing ROM, reducing/eliminating soft tissue swelling/inflammation/restriction, improving soft tissue extensibility and allowing for proper ROM for normal function with self care, mobility, lifting and ambulation.      Modalities:       Charges:  Timed Code Treatment Minutes: 50   Total Treatment Minutes: 50         [] EVAL  [x] RN(71159) x  2  [] IONTO  [] NMR (11193) x     [] VASO  [x] Manual (63904) x 1     [] Other:  [] TA x     [] Gait x   [] Mech Traction (64670)  [] ES(attended) (67250)     [] ES (un) (31949):       Electronically signed by: Clifford Chávez PT, DPT, Efrain Ponce #[id number]        Note: If patient does not return for scheduled/ recommended follow up visits, this note will serve as a discharge from care along with most recent update on progress.

## 2021-09-17 ENCOUNTER — HOSPITAL ENCOUNTER (OUTPATIENT)
Dept: PHYSICAL THERAPY | Age: 41
Setting detail: THERAPIES SERIES
Discharge: HOME | End: 2021-09-17
Payer: OTHER GOVERNMENT

## 2021-09-17 PROCEDURE — 97140 MANUAL THERAPY 1/> REGIONS: CPT

## 2021-09-17 PROCEDURE — 97110 THERAPEUTIC EXERCISES: CPT

## 2021-09-17 NOTE — FLOWSHEET NOTE
Physical Therapy Daily Treatment Note/Recert Note    Patient was seen for 20 Visits of PT. Initial eval date 21. Pt progressing well meeting 2/2 short term therapy goals and is progressing well towards long term therapy goals for his L shoulder. Plan of care to be extended for an additional 11 visits to address pt's ongoing L shoulder and lower back pain. Thank you for your referral of this patient.      Jacob Neal PT, DPT, Renetta Booker #[id number]        Date:  2021    Patient Name:  Demetrio Mccann    :  1980  MRN: [id number]      Medical/Treatment Diagnosis Information:  · Diagnosis: Acute pain of left shoulder (M25.512), chronic bilateral low back pain with bilateral sciatica (M54.42)  · Treatment Diagnosis: low back pain with sciatica, L shoulder pain with mobility deficits    Insurance/Certification information:  PT Insurance Information: , pre-auth required, $21 co-pay    Physician Information:  Referring Practitioner: Mike Stock DO    Plan of care signed :  []  Yes  [x] No   []  Cosign [x]  Fax 21     Date of Patient follow up with Physician:     Is this a Progress Report:     [x]  Yes  [x]  No      If Yes:  Date Range for reporting period:  Beginning 21  Ending  21       Progress report will be due (10 Rx or 30 days whichever is less):        Recertification will be due (POC Duration  / 90 days whichever is less): 31st visit      Visit # Insurance Allowable Auth Required   ,  for new POC; 20 visits total 15 additional authorized on 9/3/21; 16 authorized [x]  Yes []  No        Functional Scale: GISSELLE      Date 21  Score: 27/50 = 54% disability  24/ 50 = 48%  26/50 = 52%   24/50 = 48%    Functional Scale: SPADI   21   Date 21      Score: 57/130 = 44% disability 67/130 = 52% disability 62/130 = 48% disability      Latex Allergy:  [x]NO      []YES  Preferred Language for Healthcare:   [x]English []other:    RESTRICTIONS/PRECAUTIONS: previous hx of laminectomy     SUBJECTIVE:    No new complaints, pt reports that his shoulder feels about the same as at his last visit. Pt has brought in his previous HEPs so that he can discuss them with the therapist.     * sign for shoulder: picking up weighted items, horizontal abduction, abduction with arm at side, reaching overhead, especially with weighted items, putting hand behind his back    *sign for lower back: He reports that the shooting pain down his LEs is reproduced with bending forward to tie his shoes, turning/rotating to reach for items, and picking up items off of the ground    Pain level:  3/10 in L shoulder currently    Plan Moving Forward/ For next visit:    Joint mobilizations/PNF/passive stretching to improve pain-free L shoulder ROM   Progress Left Shoulder AROM and Strength Ex          OBJECTIVE:     Exercises/Interventions:     Exercises in bold performed in department today. Items not bolded are carried forward from prior visits for continuity of the record.   Exercise/Equipment Resistance/Repetitions HEP Other comments   Sidelying sciatic nerve glide in R sidelying   2 min []    Supine sciatic nerve glide  1 min 15 sec [x]    Prone press-up   2 min; 1 min each with LE pointed directionally [x]    Bridging with trunk rotation 3 x 8 reps  2x20 [x] UEs extended, holding ball    Standing hip hinge 12 reps     Bridge with sustained abd  RTB 3x15     Bridge with sustained ADD (Ball) 2x20     Lumbar roll stretch   1 min in L sidelying [x] Reviewed for HEP   Lumbar lateral glides   3 x 20-25 reps hips to L []    Recumbent bike   3 min, RPE 4-6/10 []    Elliptical  4 min  For warmup   SLR with contralateral arm extended  TA activation + supine marching   1 x 10 knees to chest, 2 x 10 holding 8# DB  2 x 12 _    Seated sciatic nerve glide  2 x 1 min _     Single knee to chest    5 x 15 sec hold bilaterally _ Using towel to pull knee towards chest in L shoulder   Landmine press  6# on stick - 24 reps (3 RIR), 9# on stick - 2 x 12     Shoulder horizontal abduction 2.5# - 2x8, 5# - 8 reps  Using cable machine   Sidelying shoulder flexion 3# - 7 reps (RIR 2),      Shoulder IR and extension AAROM 1 min, pulling towel behind back     Supine W's - moving into ER 1 min     Shoulder IR isometrics in 90/90 position 1 min     Sidelying shoulder external rotation 3# - 10 reps, 11 reps (RIR 2)     Standing closed chain shoulder extension 1 min 30 sec  Holding doorjamb     Therapeutic Exercise/Home Exercise Program:   30 minutes  Pt inst in role of PT, prognosis, plan of care, use of CP/HP, activity modification, and benefits of therapy. HEP has been established, See above, pt given handout(s) of new exercises. Updated HEP given to pt today, reviewed with patient at end of session. See HEP exercises below. Provided RTB  *Reassessment performed today, see above. Therapist and pt discussed strategies for HEP performance (what activities have been most beneficial, when to perform exercises throughout the day, how often to perform exercise) at length to increase compliance with HEP and to determine best plan of action to help pt continue making functional progress. Access Code: [email or web address]  URL: [email or web address]  Date: 09/17/2021  Prepared by: Tara Adams    Exercises  Seated Lumbar Flexion Stretch - 1-2 x daily - 5-7 x weekly - 1-2 sets - 10 reps - 5-10 hold  Supine Figure 4 Piriformis Stretch - 1 x daily - 5-7 x weekly - 2-4 sets - 5-8 reps - 5-10 seconds hold  Standing Hip Hinge with Dowel - 1 x daily - 2-3 x weekly - 2-3 sets - 8-12 reps  Dumbbell Squat at Shoulders - 1 x daily - 2-3 x weekly - 3 sets - 10 reps  Standing Diagonal Chops with Medicine Ball - 1 x daily - 2-3 x weekly - 2-3 sets - 8-12 reps    Access Code: [email or web address]  URL: [email or web address]  Date: 09/17/2021  Prepared by: Cat Huerta    Exercises  Push Up on Table - 1 07/19/21  [x]? Progressing: []? Met: []? Not Met: []? Adjusted     Therapist goals for Patient:   Short Term Goals: To be achieved in: 4 weeks  1. Independent in HEP and progression per patient tolerance, in order to prevent re-injury. []? Progressing: [x]? Met: []? Not Met: []? Adjusted  2. Patient's will have a decrease in pain to 4/10 on average to facilitate improvement in movement, function, and ADLs as indicated by Functional Deficits. []? Progressing: [x]? Met: []? Not Met: []? Adjusted     Long Term Goals: To be achieved in: 8 weeks  1. Disability index score of 40% or less for the GISSELLE to assist with improving functional mobility and capacity. [x]? Progressing: []? Met: []? Not Met: []? Adjusted  2. Patient will demonstrate increased lumbar AROM to 100% WNL with no LE symptoms. [x]? Progressing: []? Met: []? Not Met: []? Adjusted  3. Patient will demonstrate an increase in NM recruitment/activation and overall GH and scapular strength to 4+/5 or WNL for proper functional mobility as indicated by patients Functional Deficits. [x]? Progressing: nearing []? Met: []? Not Met: []? Adjusted  4. Patient will complete a 20 minute strength training workout with minimal to no low back pain. 0% progress as of 07/19/21  []? Progressing: []? Met: [x]? Not Met: []? Adjusted    Overall Progression Towards Functional goals/ Treatment Progress Update:  [] Patient is progressing as expected towards functional goals listed. [x] Progression is slowed due to complexities/Impairments listed. [] Progression has been slowed due to co-morbidities.   [] Plan just implemented, too soon to assess goals progression <30days   [] Goals require adjustment due to lack of progress  [] Patient is not progressing as expected and requires additional follow up with physician  [] Other    Prognosis for POC: [x] Good [] Fair  [] Poor    Patient requires continued skilled intervention: [x] Yes  [] No    Treatment/Activity Tolerance:  [x] Patient able to complete treatment  [] Patient limited by fatigue  [] Patient limited by pain    [] Patient limited by other medical complications  [] Other:         PLAN: See eval  [] Continue per plan of care [x] Alter current plan (see comments above) - extend current POC for 11 additional visits (31 total visits approved)  [] Plan of care initiated [] Hold pending MD visit [] Discharge        Therapeutic Exercise and NMR EXR  [x] (91334) Provided verbal/tactile cueing for activities related to strengthening, flexibility, endurance, ROM  for improvements in proximal strength and core control with self care, mobility, lifting and ambulation.  [] (97462) Provided verbal/tactile cueing for activities related to improving balance, coordination, kinesthetic sense, posture, motor skill, proprioception  to assist with core control in self care, mobility, lifting, and ambulation.      Therapeutic Activities and Gait:    [] (90295 or 76217) Provided verbal/tactile cueing for activities related to improving balance, coordination, kinesthetic sense, posture, motor skill, proprioception and motor activation to allow for proper function  with self care and ADLs  [] (38238) Provided training and instruction to the patient for proper core and proximal hip recruitment and positioning with ambulation re-education     Home Exercise Program:    [x] (60464) Reviewed/Progressed HEP activities related to strengthening, flexibility, endurance, ROM of core, proximal hip and LE for functional self-care, mobility, lifting and ambulation   [] (34074) Reviewed/Progressed HEP activities related to improving balance, coordination, kinesthetic sense, posture, motor skill, proprioception of core, proximal hip and LE for self care, mobility, lifting, and ambulation      Manual Treatments:  PROM / STM / Oscillations-Mobs:  G-I, II, III, IV (PA's, Inf., Post.)  [x] (70238) Provided manual therapy to mobilize proximal hip and LS spine soft tissue/joints for the purpose of modulating pain, promoting relaxation,  increasing ROM, reducing/eliminating soft tissue swelling/inflammation/restriction, improving soft tissue extensibility and allowing for proper ROM for normal function with self care, mobility, lifting and ambulation. Modalities:       Charges:  Timed Code Treatment Minutes: 45   Total Treatment Minutes: 45         [] EVAL  [x] YR(20106) x  2  [] IONTO  [] NMR (03544) x     [] VASO  [x] Manual (09346) x 1     [] Other:  [] TA x     [] Gait x   [] [facility] (45252)  [] ES(attended) (17946)     [] ES (un) (90527):       Electronically signed by: Jacob Neal PT, DPT, Renetta Booker #[id number]        Note: If patient does not return for scheduled/ recommended follow up visits, this note will serve as a discharge from care along with most recent update on progress.

## 2021-09-20 ENCOUNTER — HOSPITAL ENCOUNTER (OUTPATIENT)
Dept: PHYSICAL THERAPY | Age: 41
Setting detail: THERAPIES SERIES
Discharge: HOME | End: 2021-09-20
Payer: OTHER GOVERNMENT

## 2021-09-20 PROCEDURE — 97110 THERAPEUTIC EXERCISES: CPT

## 2021-09-20 PROCEDURE — 97140 MANUAL THERAPY 1/> REGIONS: CPT

## 2021-09-20 NOTE — FLOWSHEET NOTE
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.42)  · Treatment Diagnosis: low back pain with sciatica, L shoulder pain with mobility deficits    Insurance/Certification information:  PT Insurance Information: [employer], pre-auth required, $21 co-pay    Physician Information:  Referring Practitioner: Shaheen Alba DO    Plan of care signed :  [x]  Yes  [] No   []  Cosign [x]  Fax 9/3/21     Date of Patient follow up with Physician:     Is this a Progress Report:     []  Yes  [x]  No      If Yes:  Date Range for reporting period:  Beginning 21  Ending       Progress report will be due (10 Rx or 30 days whichever is less):  57/54/10      Recertification will be due (POC Duration  / 90 days whichever is less): 31 visit      Visit # Insurance Allowable Auth Required   , ,  for new POC; 21 visits total 15 additional authorized on 9/3/21; 16 authorized [x]  Yes []  No        Functional Scale: GISSELLE      Date 21  Score: 27/50 = 54% disability  24/ 50 = 48%  26/50 = 52%   24/50 = 48%    Functional Scale: SPADI   21   Date 21      Score: 57/130 = 44% disability 67/130 = 52% disability 62/130 = 48% disability      Latex Allergy:  [x]NO      []YES  Preferred Language for Healthcare:   [x]English       []other:    RESTRICTIONS/PRECAUTIONS: previous hx of laminectomy     SUBJECTIVE:    Pt reports that his L shoulder is \"feeling good,\" no new complaints since his last PT session. Says that he feels like he is making good progress with his L shoulder.      * sign for shoulder: picking up weighted items, horizontal abduction, abduction with arm at side, reaching overhead, especially with weighted items, putting hand behind his back    *sign for lower back: He reports that the shooting pain down his LEs is reproduced with bending forward to tie his shoes, turning/rotating to reach for items, and picking up items off of the ground    Pain level:  3/10 in L shoulder currently    Plan Moving Forward/ For next visit:    Joint mobilizations/PNF/passive stretching to improve pain-free L shoulder ROM   Progress Left Shoulder AROM and Strength Ex          OBJECTIVE:     Exercises/Interventions:     Exercises in bold performed in department today. Items not bolded are carried forward from prior visits for continuity of the record.   Exercise/Equipment Resistance/Repetitions HEP Other comments   Sidelying sciatic nerve glide in R sidelying   2 min []    Supine sciatic nerve glide  1 min 15 sec [x]    Prone press-up   2 min; 1 min each with LE pointed directionally [x]    Bridging with trunk rotation 3 x 8 reps  2x20 [x] UEs extended, holding ball    Standing hip hinge 12 reps     Bridge with sustained abd  RTB 3x15     Bridge with sustained ADD (Ball) 2x20     Lumbar roll stretch   1 min in L sidelying [x] Reviewed for HEP   Lumbar lateral glides   3 x 20-25 reps hips to L []    Recumbent bike   3 min, RPE 4-6/10 []    Elliptical  4 min  For warmup   SLR with contralateral arm extended  TA activation + supine marching   1 x 10 knees to chest, 2 x 10 holding 8# DB  2 x 12 _    Seated sciatic nerve glide  2 x 1 min _     Single knee to chest    5 x 15 sec hold bilaterally _ Using towel to pull knee towards chest   Lumbar rotation dynamic stretch    1 min 30 sec _    _Side plank   6 reps each side _ HOLD until L shoulder pain improves   Standing lumbar flexion 1 min  Walking hands down legs, increased pain with activity   Seated lumbar flexion  2 min _ In pain-free ROM   Forward lunge  2 x 10, holding 9# in L hand _ L LE only   Goblet squat with slow eccentric   2 x 12, holding 10# weight _    Figure 4 stretch with lumbar rotation  2 min _ min     Shoulder IR isometrics in 90/90 position 1 min     Sidelying shoulder external rotation 3# - 10 reps, 11 reps (RIR 2)     [place] get up 2 x 8, 7#     Standing closed chain shoulder extension 1 min 30 sec  Holding doorjamb     Therapeutic Exercise/Home Exercise Program:   35 minutes  Pt inst in role of PT, prognosis, plan of care, use of CP/HP, activity modification, and benefits of therapy. HEP has been established, See above, pt given handout(s) of new exercises. Updated HEP given to pt today, reviewed with patient at end of session. See HEP exercises below. Provided RTB  *Reassessment performed today, see above. Therapist and pt discussed strategies for HEP performance (what activities have been most beneficial, when to perform exercises throughout the day, how often to perform exercise) at length to increase compliance with HEP and to determine best plan of action to help pt continue making functional progress. Access Code: [email or web address]  URL: [email or web address]  Date: 09/17/2021  Prepared by: Sayra Escudero    Exercises  Seated Lumbar Flexion Stretch - 1-2 x daily - 5-7 x weekly - 1-2 sets - 10 reps - 5-10 hold  Supine Figure 4 Piriformis Stretch - 1 x daily - 5-7 x weekly - 2-4 sets - 5-8 reps - 5-10 seconds hold  Standing Hip Hinge with Dowel - 1 x daily - 2-3 x weekly - 2-3 sets - 8-12 reps  Dumbbell Squat at Shoulders - 1 x daily - 2-3 x weekly - 3 sets - 10 reps  Standing Diagonal Chops with Medicine Ball - 1 x daily - 2-3 x weekly - 2-3 sets - 8-12 reps    Access Code: [email or web address]  URL: [email or web address]  Date: 09/17/2021  Prepared by: Sayra Escudero    Exercises  Push Up on Table - 1 x daily - 4-7 x weekly - 2-3 sets - 6-12 reps  Sidelying shoulder external rotation - patient took video of exercise  Supine W - patient took video of exercise           Therapeutic Activity:  0 minutes     Gait: 0 minutes    Neuromuscular Re-Education:  0 minutes      Sherly Repositioning Procedure:      Manual Therapy:  13 minutes   Neutral Alignment Today. Focus of manual tx shifted to Left Shoulder   Joint mobs GR III, IV : posterior , inferior ; distraction   PROM to shoulder flexion, Abd, IR, ER  PNF - rhythmic stabilization with shoulder flexion, abduction, ER/IR  STM/gentle massage to L periscapular muscles and lateral shoulder   Gentle manual distraction of shoulder with MFR unwinding. Improved ROM at end of session    Lumbar rotation with overpressure - both directions. Slightly decreased ROM with rotation to R. Unilateral PA mobs to lumbar spine - L1-5 bilaterally      Manual traction   L hip inferior mobilization + passive stretching into hip flexion  L hip lateral mobilization + passive stretching into hip IR  PNF agonist reversal L hip flexion/extension  Lumbar lateral glides, hips to L w/overpressure from therapist  MET to promote pelvic alignment/ stabilization in supine, sidelying. Long axis distraction       Modalities: 0 minutes  DC Mechanical Txn. Progress to core and pelvic strength exercises. Mechanical lumbar traction, 15 minutes, Blue txn belts, 45 sec on/15 sec off, 57 kg pull on/28 kg pull off, pt LEs elevated on stool  Pt reported no increase in pain during activity. Felt stronger pull with blue belts. ASSESSMENT:    Pt tolerated fairly heavy loading of his L shoulder with open chain and closed chain activities without an increase in pain. Pt's pain-free L shoulder ROM is about 80% of his maximum ROM. Therapist and pt will discuss an exercise program that the pt can perform at the gym at his next visit. Goals:   Patient stated goal:  \"I would just like to be able to have less pain and more movement\" - 40% progress as of 07/19/21  [x]? Progressing: []? Met: []? Not Met: []? Adjusted     Therapist goals for Patient:   Short Term Goals: To be achieved in: 4 weeks  1.  Independent in HEP and progression per patient tolerance, in order to prevent current plan (see comments above) - extend current POC for 11 additional visits (31 total visits approved)  [] Plan of care initiated [] Hold pending MD visit [] Discharge        Therapeutic Exercise and NMR EXR  [x] (13695) Provided verbal/tactile cueing for activities related to strengthening, flexibility, endurance, ROM  for improvements in proximal strength and core control with self care, mobility, lifting and ambulation.  [] (55037) Provided verbal/tactile cueing for activities related to improving balance, coordination, kinesthetic sense, posture, motor skill, proprioception  to assist with core control in self care, mobility, lifting, and ambulation.      Therapeutic Activities and Gait:    [] (85460 or 44953) Provided verbal/tactile cueing for activities related to improving balance, coordination, kinesthetic sense, posture, motor skill, proprioception and motor activation to allow for proper function  with self care and ADLs  [] (66536) Provided training and instruction to the patient for proper core and proximal hip recruitment and positioning with ambulation re-education     Home Exercise Program:    [x] (13688) Reviewed/Progressed HEP activities related to strengthening, flexibility, endurance, ROM of core, proximal hip and LE for functional self-care, mobility, lifting and ambulation   [] (94952) Reviewed/Progressed HEP activities related to improving balance, coordination, kinesthetic sense, posture, motor skill, proprioception of core, proximal hip and LE for self care, mobility, lifting, and ambulation      Manual Treatments:  PROM / STM / Oscillations-Mobs:  G-I, II, III, IV (PA's, Inf., Post.)  [x] (03703) Provided manual therapy to mobilize proximal hip and LS spine soft tissue/joints for the purpose of modulating pain, promoting relaxation,  increasing ROM, reducing/eliminating soft tissue swelling/inflammation/restriction, improving soft tissue extensibility and allowing for proper ROM for normal function with self care, mobility, lifting and ambulation. Modalities:       Charges:  Timed Code Treatment Minutes: 48   Total Treatment Minutes: 48         [] EVAL  [x] EMMANUEL(59224) x  2  [] IONTO  [] NMR (31802) x     [] VASO  [x] Manual (27101) x 1     [] Other:  [] TA x     [] Gait x   [] Mech Traction (46598)  [] ES(attended) (07158)     [] ES (un) (99301):       Electronically signed by: Africa Chan PT, DPT, Jimmie Bangura #[id number]        Note: If patient does not return for scheduled/ recommended follow up visits, this note will serve as a discharge from care along with most recent update on progress.

## 2021-09-24 ENCOUNTER — HOSPITAL ENCOUNTER (OUTPATIENT)
Dept: PHYSICAL THERAPY | Age: 41
Setting detail: THERAPIES SERIES
Discharge: HOME | End: 2021-09-24
Payer: OTHER GOVERNMENT

## 2021-09-24 PROCEDURE — 97140 MANUAL THERAPY 1/> REGIONS: CPT

## 2021-09-24 PROCEDURE — 97110 THERAPEUTIC EXERCISES: CPT

## 2021-09-24 NOTE — FLOWSHEET NOTE
Physical Therapy Daily Treatment Note    Date:  2021    Patient Name:  Basia Cormier    :  1980  MRN: [id number]      Medical/Treatment Diagnosis Information:  · Diagnosis: Acute pain of left shoulder (M25.512), chronic bilateral low back pain with bilateral sciatica (M54.42)  · Treatment Diagnosis: low back pain with sciatica, L shoulder pain with mobility deficits    Insurance/Certification information:  PT Insurance Information: , pre-auth required, $21 co-pay    Physician Information:  Referring Practitioner: Anibal Rose DO    Plan of care signed :  [x]  Yes  [] No   []  Cosign [x]  Fax 9/3/21     Date of Patient follow up with Physician:     Is this a Progress Report:     []  Yes  [x]  No      If Yes:  Date Range for reporting period:  Beginning 21  Ending       Progress report will be due (10 Rx or 30 days whichever is less):        Recertification will be due (POC Duration  / 90 days whichever is less):  visit      Visit # Insurance Allowable Auth Required   , ,  for new POC; 22 visits total 15 additional authorized on 9/3/21; 16 authorized [x]  Yes []  No        Functional Scale: GISSELLE      Date 21  Score: 27/50 = 54% disability  24/ 50 = 48%  26/50 = 52%   24/50 = 48%    Functional Scale: SPADI   21   Date 21      Score: 57/130 = 44% disability 67/130 = 52% disability 62/130 = 48% disability      Latex Allergy:  [x]NO      []YES  Preferred Language for Healthcare:   [x]English       []other:    RESTRICTIONS/PRECAUTIONS: previous hx of laminectomy     SUBJECTIVE:    Pt reports that his L shoulder has been \"feeling about the same\" since his last visit, notes that he is having low back pain consistently throughout the day lately. He also notes that he has not been very active, says he has been a \"couch potato. \" Says that overall his HEP exercises are still challenging for the most part.      * sign for shoulder: picking up weighted items, horizontal abduction, abduction with arm at side, reaching overhead, especially with weighted items, putting hand behind his back    *sign for lower back: He reports that the shooting pain down his LEs is reproduced with bending forward to tie his shoes, turning/rotating to reach for items, and picking up items off of the ground    Pain level:  3/10 in L shoulder currently    Plan Moving Forward/ For next visit:    Joint mobilizations/PNF/passive stretching to improve pain-free L shoulder ROM   Progress Left Shoulder AROM and Strength Ex          OBJECTIVE:     Exercises/Interventions:     Exercises in bold performed in department today. Items not bolded are carried forward from prior visits for continuity of the record.   Exercise/Equipment Resistance/Repetitions HEP Other comments   Sidelying sciatic nerve glide in R sidelying   2 min []    Supine sciatic nerve glide  1 min 15 sec [x]    Prone press-up   2 min; 1 min each with LE pointed directionally [x]    Bridging with trunk rotation 3 x 8 reps  2x20 [x] UEs extended, holding ball    Standing hip hinge 12 reps     Bridge with sustained abd  RTB 3x15     Bridge with sustained ADD (Ball) 2x20     Lumbar roll stretch   1 min in L sidelying [x] Reviewed for HEP   Lumbar lateral glides   3 x 20-25 reps hips to L []    Recumbent bike   3 min, RPE 4-6/10 []    Elliptical  4 min  For warmup   SLR with contralateral arm extended  TA activation + supine marching   1 x 10 knees to chest, 2 x 10 holding 8# DB  2 x 12 _    Seated sciatic nerve glide  2 x 1 min _     Single knee to chest    5 x 15 sec hold bilaterally _ Using towel to pull knee towards chest   Lumbar rotation dynamic stretch    1 min 30 sec _    _Side plank   6 reps each side _ HOLD until L shoulder pain improves   Standing lumbar flexion 1 min  Walking hands down legs, increased pain with activity   Seated lumbar flexion  2 min _ In pain-free ROM   Forward lunge  2 x 10, holding 9# in L hand _ L LE only   Goblet squat with slow eccentric   2 x 12, holding 10# weight _    Figure 4 stretch with lumbar rotation  2 min _ Symptoms down L LE after 1 min 30 sec   Standing Hip Abduction  3x10   9#     Lateral step out/ squats with Glute squeeze 6 length of // bars  9#     Standing Hip Extension Taps    3x10  9# _    Mule Kick  3x10 9#     Suitcase carry 2 sets of 4 x 50 ft trips, 30# in bucket     Standing diagonal chop 2 x 10, 7.5#     Standing Pallof Press 2 x 12, 5#  Using cable machine   Prone trunk extension 2 x 12  Partial ROM, no assist of hands   Pelvic/ Hip Flexibility  stretches NV     Standing lumbar extension 10 reps, 10 reps with hands on wall  Pt complaints of tightness/reduced ROM in lower back   SHOULDER COMPLEX      AAROM in supine x10     Sleeper stretch x10 5 sec hold     Mid Row  25# - 2 x 10   2x15  Using cable machine   Row Row  2x15     Scifit 4 min, lvl 2  2 x 1 min 30 sec, lvl 2. 5     Pulley 1 min flexion, 1 min abduction     Shoulder abduction isometric 3 x 1 min     Wall semi-circles 1 min each direction     Wall slides 1 min each - CW, CCW; 1# AW on wrist  2 x 1 min flexion, 1 min scaption     Bent over row 15# - 12 reps (RPE 7), 17.5# - 2 x 8-12  2 x 8-12 reps 20# in bucket  using cable machine   Sidelying shoulder abduction AMRAP with 2# - 15 reps, 5 RIR      Standing shoulder IR 10# - 17 reps, 12.5# - 2 x 8-12  On cable machine   Standing shoulder ER 5# - 10 reps (RPE 7), 7.5# - 9 reps (RPE 9)  On cable machine   's carry 3 rounds X 1 trip each arm 5# DB     Modified push up  Push up to table 1 min - 9 reps; 1 min - 3-4 reps  10 reps  Reviewed for HEP   OH Press 2 x 8-12, 4# DB in L UE  3 x 10, 7# on stick  Attempted, held due to catching in L shoulder   Standing scaption 2 x 6-10, 3# DB on L, 5# DB on R     Plank position L arm stationary, R arm moving - 2 x 20 sec     Landmine press  6# on stick - 24 reps (3 RIR), 9# on stick - 2 x 12 Shoulder horizontal abduction 2.5# - 2x8, 5# - 8 reps  Using cable machine   Sidelying shoulder flexion 3# - 7 reps (RIR 2),      Shoulder IR and extension AAROM 1 min, pulling towel behind back     Supine W's - moving into ER 1 min     Shoulder IR isometrics in 90/90 position 1 min     Sidelying shoulder ER/IR 2 x 8 reps, green theraband     [place] get up 2 x 10, 7#  Beginning of movement   Prone shoulder flexion 3 x 10     Standing closed chain shoulder extension 1 min 30 sec  Holding doorjamb     Therapeutic Exercise/Home Exercise Program:   33 minutes  Pt inst in role of PT, prognosis, plan of care, use of CP/HP, activity modification, and benefits of therapy. HEP has been established, See above, pt given handout(s) of new exercises. Updated HEP given to pt today, reviewed with patient at end of session. See HEP exercises below. Provided RTB  *Reassessment performed today, see above. Therapist and pt discussed strategies for HEP performance (what activities have been most beneficial, when to perform exercises throughout the day, how often to perform exercise) at length to increase compliance with HEP and to determine best plan of action to help pt continue making functional progress. Access Code: [email or web address]  URL: [email or web address] [email or web address]  Date: 09/17/2021  Prepared by: Dorothey Skiff    Exercises  Seated Lumbar Flexion Stretch - 1-2 x daily - 5-7 x weekly - 1-2 sets - 10 reps - 5-10 hold  Supine Figure 4 Piriformis Stretch - 1 x daily - 5-7 x weekly - 2-4 sets - 5-8 reps - 5-10 seconds hold  Standing Hip Hinge with Dowel - 1 x daily - 2-3 x weekly - 2-3 sets - 8-12 reps  Dumbbell Squat at Shoulders - 1 x daily - 2-3 x weekly - 3 sets - 10 reps  Standing Diagonal Chops with Medicine Ball - 1 x daily - 2-3 x weekly - 2-3 sets - 8-12 reps    Access Code: [email or web address]  URL: [email or web address]  Date: 09/24/2021  Prepared by: Natalie Huerta    Exercises  Push Up on Table - 1 x daily - 4-7 x weekly - 2-3 sets - 6-12 reps  Single Arm Scaption with Resistance - 1 x daily - 5-7 x weekly - 2-3 sets - 8-12 reps  Sidelying shoulder external rotation - patient took video of exercise  Supine W - patient took video of exercise           Therapeutic Activity:  0 minutes     Gait: 0 minutes    Neuromuscular Re-Education:  0 minutes      Canalith Repositioning Procedure:      Manual Therapy:  12 minutes   Neutral Alignment Today. Focus of manual tx shifted to Left Shoulder   Joint mobs GR III, IV : posterior , inferior ; distraction   PROM to shoulder flexion, Abd, IR, ER  PNF - rhythmic stabilization with shoulder flexion, abduction, ER/IR  STM/gentle massage to L periscapular muscles and lateral shoulder   Gentle manual distraction of shoulder with MFR unwinding. Improved ROM at end of session    Lumbar rotation with overpressure - both directions. Slightly decreased ROM with rotation to R. Unilateral PA mobs to lumbar spine - L1-5 bilaterally      Manual traction   L hip inferior mobilization + passive stretching into hip flexion  L hip lateral mobilization + passive stretching into hip IR  PNF agonist reversal L hip flexion/extension  Lumbar lateral glides, hips to L w/overpressure from therapist  MET to promote pelvic alignment/ stabilization in supine, sidelying. Long axis distraction       Modalities: 0 minutes  DC Mechanical Txn. Progress to core and pelvic strength exercises. Mechanical lumbar traction, 15 minutes, Blue txn belts, 45 sec on/15 sec off, 57 kg pull on/28 kg pull off, pt LEs elevated on stool  Pt reported no increase in pain during activity. Felt stronger pull with blue belts. ASSESSMENT:    Pt's ability to tolerated L shoulder ER/IR against resistance is improving. He was able to reach nearly full ROM without pain when doing overhead in scaption plane.  He will require further skilled PT to eliminate his pain, improve his L shoulder ROM, and increase his L UE functional strength. Goals:   Patient stated goal:  \"I would just like to be able to have less pain and more movement\" - 40% progress as of 07/19/21  [x]? Progressing: []? Met: []? Not Met: []? Adjusted     Therapist goals for Patient:   Short Term Goals: To be achieved in: 4 weeks  1. Independent in HEP and progression per patient tolerance, in order to prevent re-injury. []? Progressing: [x]? Met: []? Not Met: []? Adjusted  2. Patient's will have a decrease in pain to 4/10 on average to facilitate improvement in movement, function, and ADLs as indicated by Functional Deficits. []? Progressing: [x]? Met: []? Not Met: []? Adjusted     Long Term Goals: To be achieved in: 8 weeks  1. Disability index score of 40% or less for the GISSELLE to assist with improving functional mobility and capacity. [x]? Progressing: []? Met: []? Not Met: []? Adjusted  2. Patient will demonstrate increased lumbar AROM to 100% WNL with no LE symptoms. [x]? Progressing: []? Met: []? Not Met: []? Adjusted  3. Patient will demonstrate an increase in NM recruitment/activation and overall GH and scapular strength to 4+/5 or WNL for proper functional mobility as indicated by patients Functional Deficits. [x]? Progressing: nearing []? Met: []? Not Met: []? Adjusted  4. Patient will complete a 20 minute strength training workout with minimal to no low back pain. 0% progress as of 07/19/21  []? Progressing: []? Met: [x]? Not Met: []? Adjusted    Overall Progression Towards Functional goals/ Treatment Progress Update:  [] Patient is progressing as expected towards functional goals listed. [x] Progression is slowed due to complexities/Impairments listed. [] Progression has been slowed due to co-morbidities.   [] Plan just implemented, too soon to assess goals progression <30days   [] Goals require adjustment due to lack of progress  [] Patient is not progressing as expected and requires additional follow up with physician  [] Other    Prognosis for POC: [x] Good [] Fair  [] Poor    Patient requires continued skilled intervention: [x] Yes  [] No    Treatment/Activity Tolerance:  [x] Patient able to complete treatment  [] Patient limited by fatigue  [] Patient limited by pain    [] Patient limited by other medical complications  [] Other:         PLAN: See eval  [] Continue per plan of care [x] Alter current plan (see comments above) - extend current POC for 11 additional visits (31 total visits approved)  [] Plan of care initiated [] Hold pending MD visit [] Discharge        Therapeutic Exercise and NMR EXR  [x] (41992) Provided verbal/tactile cueing for activities related to strengthening, flexibility, endurance, ROM  for improvements in proximal strength and core control with self care, mobility, lifting and ambulation.  [] (93335) Provided verbal/tactile cueing for activities related to improving balance, coordination, kinesthetic sense, posture, motor skill, proprioception  to assist with core control in self care, mobility, lifting, and ambulation.      Therapeutic Activities and Gait:    [] (15433 or 61291) Provided verbal/tactile cueing for activities related to improving balance, coordination, kinesthetic sense, posture, motor skill, proprioception and motor activation to allow for proper function  with self care and ADLs  [] (39964) Provided training and instruction to the patient for proper core and proximal hip recruitment and positioning with ambulation re-education     Home Exercise Program:    [x] (05456) Reviewed/Progressed HEP activities related to strengthening, flexibility, endurance, ROM of core, proximal hip and LE for functional self-care, mobility, lifting and ambulation   [] (43054) Reviewed/Progressed HEP activities related to improving balance, coordination, kinesthetic sense, posture, motor skill, proprioception of core, proximal hip and LE for self care, mobility, lifting, and ambulation      Manual Treatments:  PROM / STM / Oscillations-Mobs:  G-I, II, III, IV (PA's, Inf., Post.)  [x] (18872) Provided manual therapy to mobilize proximal hip and LS spine soft tissue/joints for the purpose of modulating pain, promoting relaxation,  increasing ROM, reducing/eliminating soft tissue swelling/inflammation/restriction, improving soft tissue extensibility and allowing for proper ROM for normal function with self care, mobility, lifting and ambulation. Modalities:       Charges:  Timed Code Treatment Minutes: 45   Total Treatment Minutes: 45         [] EVAL  [x] LD(30668) x  2  [] IONTO  [] NMR (07482) x     [] VASO  [x] Manual (30950) x 1     [] Other:  [] TA x     [] Gait x   [] Mech Traction (48877)  [] ES(attended) (22215)     [] ES (un) (48169):       Electronically signed by: Cortney Munoz PT, DPT, [place] [facility] #[id number]        Note: If patient does not return for scheduled/ recommended follow up visits, this note will serve as a discharge from care along with most recent update on progress.

## 2021-09-27 ENCOUNTER — HOSPITAL ENCOUNTER (OUTPATIENT)
Dept: PHYSICAL THERAPY | Age: 41
Setting detail: THERAPIES SERIES
Discharge: HOME | End: 2021-09-27
Payer: OTHER GOVERNMENT

## 2021-09-27 PROCEDURE — 97110 THERAPEUTIC EXERCISES: CPT

## 2021-09-27 PROCEDURE — 97140 MANUAL THERAPY 1/> REGIONS: CPT

## 2021-09-27 NOTE — FLOWSHEET NOTE
Physical Therapy Daily Treatment Note    Date:  2021    Patient Name:  Columba Elliott    :  1980  MRN: [id number]      Medical/Treatment Diagnosis Information:  · Diagnosis: Acute pain of left shoulder (M25.512), chronic bilateral low back pain with bilateral sciatica (M54.42)  · Treatment Diagnosis: low back pain with sciatica, L shoulder pain with mobility deficits    Insurance/Certification information:  PT Insurance Information: , pre-auth required, $21 co-pay    Physician Information:  Referring Practitioner: Griffin Bolivar, DO    Plan of care signed :  [x]  Yes  [] No   []  Cosign [x]  Fax 9/3/21     Date of Patient follow up with Physician:     Is this a Progress Report:     []  Yes  [x]  No      If Yes:  Date Range for reporting period:  Beginning 21  Ending       Progress report will be due (10 Rx or 30 days whichever is less):        Recertification will be due (POC Duration  / 90 days whichever is less):  visit      Visit # Insurance Allowable Auth Required   3/11, ,  for new POC; 23 visits total 15 additional authorized on 9/3/21; 16 authorized [x]  Yes []  No        Functional Scale: GISSELLE      Date 21  Score: 27/50 = 54% disability  24/ 50 = 48%  26/50 = 52%   24/50 = 48%    Functional Scale: SPADI   21   Date 21      Score: 57/130 = 44% disability 67/130 = 52% disability 62/130 = 48% disability      Latex Allergy:  [x]NO      []YES  Preferred Language for Healthcare:   [x]English       []other:    RESTRICTIONS/PRECAUTIONS: previous hx of laminectomy     SUBJECTIVE:    Pt reports that his L shoulder \"felt good\" after his last PT appt. He reports that he still has some sharp pains in the shoulder with certain movements, although he notes that these sharp pains are occurring less frequently.     * sign for shoulder: picking up weighted items, horizontal abduction, abduction with arm at side, reaching overhead, especially with weighted items, putting hand behind his back    *sign for lower back: He reports that the shooting pain down his LEs is reproduced with bending forward to tie his shoes, turning/rotating to reach for items, and picking up items off of the ground    Pain level:  3/10 in L shoulder currently    Plan Moving Forward/ For next visit:    Joint mobilizations/PNF/passive stretching to improve pain-free L shoulder ROM   Progress Left Shoulder AROM and Strength Ex          OBJECTIVE:     Exercises/Interventions:     Exercises in bold performed in department today. Items not bolded are carried forward from prior visits for continuity of the record.   Exercise/Equipment Resistance/Repetitions HEP Other comments   Sidelying sciatic nerve glide in R sidelying   2 min []    Supine sciatic nerve glide  1 min 15 sec [x]    Prone press-up   2 min; 1 min each with LE pointed directionally [x]    Bridging with trunk rotation 3 x 8 reps  2x20 [x] UEs extended, holding ball    Standing hip hinge 12 reps     Bridge with sustained abd  RTB 3x15     Bridge with sustained ADD (Ball) 2x20     Lumbar roll stretch   1 min in L sidelying [x] Reviewed for HEP   Lumbar lateral glides   3 x 20-25 reps hips to L []    Recumbent bike   3 min, RPE 4-6/10 []    Elliptical  4 min  For warmup   SLR with contralateral arm extended  TA activation + supine marching   1 x 10 knees to chest, 2 x 10 holding 8# DB  2 x 12 _    Seated sciatic nerve glide  2 x 1 min _     Single knee to chest    5 x 15 sec hold bilaterally _ Using towel to pull knee towards chest   Lumbar rotation dynamic stretch    1 min 30 sec _    _Side plank   6 reps each side _ HOLD until L shoulder pain improves   Standing lumbar flexion 1 min  Walking hands down legs, increased pain with activity   Seated lumbar flexion  2 min _ In pain-free ROM   Forward lunge  2 x 10, holding 9# in L hand _ L LE only   Goblet squat with slow eccentric   2 x 12, holding 10# weight _    Figure 4 stretch with lumbar rotation  2 min _ Symptoms down L LE after 1 min 30 sec   Standing Hip Abduction  3x10   9#     Lateral step out/ squats with Glute squeeze 6 length of // bars  9#     Standing Hip Extension Taps    3x10  9# _    Mule Kick  3x10 9#     Suitcase carry 2 sets of 4 x 50 ft trips, 30# in bucket     Standing diagonal chop 2 x 10, 7.5#     Standing Pallof Press 2 x 12, 5#  Using cable machine   Prone trunk extension 2 x 12  Partial ROM, no assist of hands   Pelvic/ Hip Flexibility  stretches NV     Standing lumbar extension 10 reps, 10 reps with hands on wall  Pt complaints of tightness/reduced ROM in lower back   SHOULDER COMPLEX      AAROM in supine x10     Sleeper stretch x10 5 sec hold     Mid Row  25# - 2 x 10   2x15  Using cable machine   Row Row  2x15     Scifit 4 min, lvl 2  2 x 1 min 30 sec, lvl 2. 5     Pulley 1 min flexion, 1 min abduction     Shoulder abduction isometric 3 x 1 min     Wall semi-circles 1 min each direction     Wall slides 1 min each - CW, CCW; 1# AW on wrist  2 x 1 min flexion, 1 min scaption     Bent over row 15# - 12 reps (RPE 7), 17.5# - 2 x 8-12  2 x 8-12 reps 20# in bucket  using cable machine   Sidelying shoulder abduction AMRAP with 2# - 15 reps, 5 RIR      Standing shoulder IR 10# - 17 reps, 12.5# - 2 x 8-12  On cable machine   Standing shoulder ER 5# - 10 reps (RPE 7), 7.5# - 9 reps (RPE 9)  On cable machine   's carry 3 rounds X 1 trip each arm 5# DB     Modified push up  Push up to table 1 min - 9 reps; 1 min - 3-4 reps  10 reps  Reviewed for HEP   OH Press 2 x 8-12, 4# DB in L UE  3 x 10, 7# on stick  Attempted, held due to catching in L shoulder   Standing scaption 2 x 6-10, 3# DB on L, 5# DB on R     Plank position L arm stationary, R arm moving - 2 x 20 sec     Landmine press  6# on stick - 24 reps (3 RIR), 9# on stick - 2 x 12     Shoulder horizontal abduction 2.5# - 2x8, 5# - 8 reps  Using cable machine   Sidelying shoulder flexion 3# - 7 reps (RIR 2),      Shoulder IR and extension AAROM 1 min, pulling towel behind back     Supine W's - moving into ER 1 min     Shoulder IR isometrics in 90/90 position 1 min     Sidelying shoulder ER/IR 2 x 8 reps, green theraband     [place] get up 3 x 8-10, 8#  Beginning of movement   DB Incline Bench Press 3 x 12-15, 8-10# DBs     Standing shoulder abduction      Prone shoulder flexion 3 x 10     Standing closed chain shoulder extension 1 min 30 sec  Holding doorjamb     Therapeutic Exercise/Home Exercise Program:   33 minutes  Pt inst in role of PT, prognosis, plan of care, use of CP/HP, activity modification, and benefits of therapy. HEP has been established, See above, pt given handout(s) of new exercises. Updated HEP given to pt today, reviewed with patient at end of session. See HEP exercises below. Provided RTB  *Reassessment performed today, see above. Therapist and pt discussed strategies for HEP performance (what activities have been most beneficial, when to perform exercises throughout the day, how often to perform exercise) at length to increase compliance with HEP and to determine best plan of action to help pt continue making functional progress. Pt and therapist discussed and developed exercise program for pt to perform at the gym. Access Code: [email or web address]  URL: [email or web address]  Date: 09/17/2021  Prepared by: Teresita Ibarra    Exercises  Seated Lumbar Flexion Stretch - 1-2 x daily - 5-7 x weekly - 1-2 sets - 10 reps - 5-10 hold  Supine Figure 4 Piriformis Stretch - 1 x daily - 5-7 x weekly - 2-4 sets - 5-8 reps - 5-10 seconds hold  Standing Hip Hinge with Dowel - 1 x daily - 2-3 x weekly - 2-3 sets - 8-12 reps  Dumbbell Squat at Shoulders - 1 x daily - 2-3 x weekly - 3 sets - 10 reps  Standing Diagonal Chops with Medicine Ball - 1 x daily - 2-3 x weekly - 2-3 sets - 8-12 reps    Access Code: [email or web address]  URL: [email or web address]  Date: without pain. Therapist will follow up with patient at next visit to monitor his response to exercising at the gym. Goals:   Patient stated goal:  \"I would just like to be able to have less pain and more movement\" - 40% progress as of 07/19/21  [x]? Progressing: []? Met: []? Not Met: []? Adjusted     Therapist goals for Patient:   Short Term Goals: To be achieved in: 4 weeks  1. Independent in HEP and progression per patient tolerance, in order to prevent re-injury. []? Progressing: [x]? Met: []? Not Met: []? Adjusted  2. Patient's will have a decrease in pain to 4/10 on average to facilitate improvement in movement, function, and ADLs as indicated by Functional Deficits. []? Progressing: [x]? Met: []? Not Met: []? Adjusted     Long Term Goals: To be achieved in: 8 weeks  1. Disability index score of 40% or less for the GISSELLE to assist with improving functional mobility and capacity. [x]? Progressing: []? Met: []? Not Met: []? Adjusted  2. Patient will demonstrate increased lumbar AROM to 100% WNL with no LE symptoms. [x]? Progressing: []? Met: []? Not Met: []? Adjusted  3. Patient will demonstrate an increase in NM recruitment/activation and overall GH and scapular strength to 4+/5 or WNL for proper functional mobility as indicated by patients Functional Deficits. [x]? Progressing: nearing []? Met: []? Not Met: []? Adjusted  4. Patient will complete a 20 minute strength training workout with minimal to no low back pain. 0% progress as of 07/19/21  []? Progressing: []? Met: [x]? Not Met: []? Adjusted    Overall Progression Towards Functional goals/ Treatment Progress Update:  [] Patient is progressing as expected towards functional goals listed. [x] Progression is slowed due to complexities/Impairments listed. [] Progression has been slowed due to co-morbidities.   [] Plan just implemented, too soon to assess goals progression <30days   [] Goals require adjustment due to lack of progress  [] Patient is not progressing as expected and requires additional follow up with physician  [] Other    Prognosis for POC: [x] Good [] Fair  [] Poor    Patient requires continued skilled intervention: [x] Yes  [] No    Treatment/Activity Tolerance:  [x] Patient able to complete treatment  [] Patient limited by fatigue  [] Patient limited by pain    [] Patient limited by other medical complications  [] Other:         PLAN: See eval  [] Continue per plan of care [x] Alter current plan (see comments above) - extend current POC for 11 additional visits (31 total visits approved)  [] Plan of care initiated [] Hold pending MD visit [] Discharge        Therapeutic Exercise and NMR EXR  [x] (33425) Provided verbal/tactile cueing for activities related to strengthening, flexibility, endurance, ROM  for improvements in proximal strength and core control with self care, mobility, lifting and ambulation.  [] (73776) Provided verbal/tactile cueing for activities related to improving balance, coordination, kinesthetic sense, posture, motor skill, proprioception  to assist with core control in self care, mobility, lifting, and ambulation.      Therapeutic Activities and Gait:    [] (11142 or 18626) Provided verbal/tactile cueing for activities related to improving balance, coordination, kinesthetic sense, posture, motor skill, proprioception and motor activation to allow for proper function  with self care and ADLs  [] (45161) Provided training and instruction to the patient for proper core and proximal hip recruitment and positioning with ambulation re-education     Home Exercise Program:    [x] (89582) Reviewed/Progressed HEP activities related to strengthening, flexibility, endurance, ROM of core, proximal hip and LE for functional self-care, mobility, lifting and ambulation   [] (29625) Reviewed/Progressed HEP activities related to improving balance, coordination, kinesthetic sense, posture, motor skill, proprioception of core, proximal hip and LE for self care, mobility, lifting, and ambulation      Manual Treatments:  PROM / STM / Oscillations-Mobs:  G-I, II, III, IV (PA's, Inf., Post.)  [x] (76726) Provided manual therapy to mobilize proximal hip and LS spine soft tissue/joints for the purpose of modulating pain, promoting relaxation,  increasing ROM, reducing/eliminating soft tissue swelling/inflammation/restriction, improving soft tissue extensibility and allowing for proper ROM for normal function with self care, mobility, lifting and ambulation. Modalities:       Charges:  Timed Code Treatment Minutes: 45   Total Treatment Minutes: 45         [] EVAL  [x] ZO(06527) x  2  [] IONTO  [] NMR (08856) x     [] VASO  [x] Manual (21960) x 1     [] Other:  [] TA x     [] Gait x   [] Mech Traction (68458)  [] ES(attended) (81003)     [] ES (un) (67656):       Electronically signed by: Greer Sevilla PT, DPT, Lissette Johnson #[id number]        Note: If patient does not return for scheduled/ recommended follow up visits, this note will serve as a discharge from care along with most recent update on progress.

## 2021-10-01 ENCOUNTER — HOSPITAL ENCOUNTER (OUTPATIENT)
Dept: PHYSICAL THERAPY | Age: 41
Setting detail: THERAPIES SERIES
Discharge: HOME | End: 2021-10-01
Payer: OTHER GOVERNMENT

## 2021-10-01 PROCEDURE — 97110 THERAPEUTIC EXERCISES: CPT

## 2021-10-01 NOTE — FLOWSHEET NOTE
Physical Therapy Daily Treatment Note    Date:  10/1/2021    Patient Name:  Bora Arellano    :  1980  MRN: [id number]      Medical/Treatment Diagnosis Information:  · Diagnosis: Acute pain of left shoulder (M25.512), chronic bilateral low back pain with bilateral sciatica (M54.42)  · Treatment Diagnosis: low back pain with sciatica, L shoulder pain with mobility deficits    Insurance/Certification information:  PT Insurance Information: , pre-auth required, $21 co-pay    Physician Information:  Referring Practitioner: Britney Llamas DO    Plan of care signed :  [x]  Yes  [] No   []  Cosign [x]  Fax 9/3/21     Date of Patient follow up with Physician:     Is this a Progress Report:     []  Yes  [x]  No      If Yes:  Date Range for reporting period:  Beginning 21  Ending       Progress report will be due (10 Rx or 30 days whichever is less):        Recertification will be due (POC Duration  / 90 days whichever is less):  visit      Visit # Insurance Allowable Auth Required   , ,  for new POC; 24 visits total 15 additional authorized on 9/3/21; 16 authorized [x]  Yes []  No        Functional Scale: GISSELLE      Date 21  Score: 27/50 = 54% disability  24/ 50 = 48%  26/50 = 52%   24/50 = 48%    Functional Scale: SPADI   21   Date 21      Score: 57/130 = 44% disability 67/130 = 52% disability 62/130 = 48% disability      Latex Allergy:  [x]NO      []YES  Preferred Language for Healthcare:   [x]English       []other:    RESTRICTIONS/PRECAUTIONS: previous hx of laminectomy     SUBJECTIVE:    Pt reports that his L shoulder is getting better little by little. He reports that he is open to beginning to work on his lower back issues. He reports that his low back pain is increased with bending forward, backward, and to either side. He also has pain with rolling in bed, climbing stairs.  Reports that the pain gets worse Piriformis Stretch - 1 x daily - 5-7 x weekly - 2-4 sets - 5-8 reps - 5-10 seconds hold  Standing Hip Hinge with Dowel - 1 x daily - 2-3 x weekly - 2-3 sets - 8-12 reps  Dumbbell Squat at Shoulders - 1 x daily - 2-3 x weekly - 3 sets - 10 reps  Standing Diagonal Chops with Medicine Ball - 1 x daily - 2-3 x weekly - 2-3 sets - 8-12 reps  Supine Double Knee to Chest - 1 x daily - 3-4 x weekly - 6-10 reps - 30 seconds hold      Access Code: [email or web address]  URL: [email or web address]  Date: 09/24/2021  Prepared by: Hiral Joseph    Exercises  Push Up on Table - 1 x daily - 4-7 x weekly - 2-3 sets - 6-12 reps  Single Arm Scaption with Resistance - 1 x daily - 5-7 x weekly - 2-3 sets - 8-12 reps  Sidelying shoulder external rotation - patient took video of exercise  Supine W - patient took video of exercise           Therapeutic Activity:  0 minutes     Gait: 0 minutes    Neuromuscular Re-Education:  0 minutes      Canalith Repositioning Procedure:      Manual Therapy:  12 minutes   Neutral Alignment Today. Focus of manual tx shifted to Left Shoulder   Joint mobs GR III, IV : posterior , inferior ; distraction   PROM to shoulder flexion, Abd, IR, ER  PNF - rhythmic stabilization with shoulder flexion, abduction, ER/IR  STM/gentle massage to L periscapular muscles and lateral shoulder   Gentle manual distraction of shoulder with MFR unwinding. Improved ROM at end of session    Lumbar rotation with overpressure - both directions. Slightly decreased ROM with rotation to R. Unilateral PA mobs to lumbar spine - L1-5 bilaterally      Manual traction   L hip inferior mobilization + passive stretching into hip flexion  L hip lateral mobilization + passive stretching into hip IR  PNF agonist reversal L hip flexion/extension  Lumbar lateral glides, hips to L w/overpressure from therapist  MET to promote pelvic alignment/ stabilization in supine, sidelying.    Long axis distraction       Modalities: 0 minutes  DC Mechanical Txn. Progress to core and pelvic strength exercises. Mechanical lumbar traction, 15 minutes, Blue txn belts, 45 sec on/15 sec off, 57 kg pull on/28 kg pull off, pt LEs elevated on stool  Pt reported no increase in pain during activity. Felt stronger pull with blue belts. ASSESSMENT:    · Pt limited in his lumbar ROM, especially with flexion and extension  · Pt was able to perform most functional activities without increased pain  · Will likely benefit from continued strengthening and increasing his functional capacity for physical activities      Goals:   Patient stated goal:  \"I would just like to be able to have less pain and more movement\" - 40% progress as of 07/19/21  [x]? Progressing: []? Met: []? Not Met: []? Adjusted     Therapist goals for Patient:   Short Term Goals: To be achieved in: 4 weeks  1. Independent in HEP and progression per patient tolerance, in order to prevent re-injury. []? Progressing: [x]? Met: []? Not Met: []? Adjusted  2. Patient's will have a decrease in pain to 4/10 on average to facilitate improvement in movement, function, and ADLs as indicated by Functional Deficits. []? Progressing: [x]? Met: []? Not Met: []? Adjusted     Long Term Goals: To be achieved in: 8 weeks  1. Disability index score of 40% or less for the GISSELLE to assist with improving functional mobility and capacity. [x]? Progressing: []? Met: []? Not Met: []? Adjusted  2. Patient will demonstrate increased lumbar AROM to 100% WNL with no LE symptoms. [x]? Progressing: []? Met: []? Not Met: []? Adjusted  3. Patient will demonstrate an increase in NM recruitment/activation and overall GH and scapular strength to 4+/5 or WNL for proper functional mobility as indicated by patients Functional Deficits. [x]? Progressing: nearing []? Met: []? Not Met: []? Adjusted  4. Patient will complete a 20 minute strength training workout with minimal to no low back pain. 0% progress as of 07/19/21  []? Progressing: []? Met: [x]? Not Met: []? Adjusted    Overall Progression Towards Functional goals/ Treatment Progress Update:  [] Patient is progressing as expected towards functional goals listed. [x] Progression is slowed due to complexities/Impairments listed. [] Progression has been slowed due to co-morbidities. [] Plan just implemented, too soon to assess goals progression <30days   [] Goals require adjustment due to lack of progress  [] Patient is not progressing as expected and requires additional follow up with physician  [] Other    Prognosis for POC: [x] Good [] Fair  [] Poor    Patient requires continued skilled intervention: [x] Yes  [] No    Treatment/Activity Tolerance:  [x] Patient able to complete treatment  [] Patient limited by fatigue  [] Patient limited by pain    [] Patient limited by other medical complications  [] Other:         PLAN: See eval  [] Continue per plan of care [x] Alter current plan (see comments above) - extend current POC for 11 additional visits (31 total visits approved)  [] Plan of care initiated [] Hold pending MD visit [] Discharge        Therapeutic Exercise and NMR EXR  [x] (74396) Provided verbal/tactile cueing for activities related to strengthening, flexibility, endurance, ROM  for improvements in proximal strength and core control with self care, mobility, lifting and ambulation.  [] (72761) Provided verbal/tactile cueing for activities related to improving balance, coordination, kinesthetic sense, posture, motor skill, proprioception  to assist with core control in self care, mobility, lifting, and ambulation.      Therapeutic Activities and Gait:    [] (90679 or 12058) Provided verbal/tactile cueing for activities related to improving balance, coordination, kinesthetic sense, posture, motor skill, proprioception and motor activation to allow for proper function  with self care and ADLs  [] (38759) Provided training and instruction to the patient for proper core and proximal hip recruitment and positioning with ambulation re-education     Home Exercise Program:    [x] (36909) Reviewed/Progressed HEP activities related to strengthening, flexibility, endurance, ROM of core, proximal hip and LE for functional self-care, mobility, lifting and ambulation   [] (00243) Reviewed/Progressed HEP activities related to improving balance, coordination, kinesthetic sense, posture, motor skill, proprioception of core, proximal hip and LE for self care, mobility, lifting, and ambulation      Manual Treatments:  PROM / STM / Oscillations-Mobs:  G-I, II, III, IV (PA's, Inf., Post.)  [x] (95847) Provided manual therapy to mobilize proximal hip and LS spine soft tissue/joints for the purpose of modulating pain, promoting relaxation,  increasing ROM, reducing/eliminating soft tissue swelling/inflammation/restriction, improving soft tissue extensibility and allowing for proper ROM for normal function with self care, mobility, lifting and ambulation. Modalities:       Charges:  Timed Code Treatment Minutes: 45   Total Treatment Minutes: 45         [] EVAL  [x] MC(63116) x  3  [] IONTO  [] NMR (71818) x     [] VASO  [] Manual (94286) x      [] Other:  [] TA x     [] Gait x   [] [facility] (56683)  [] ES(attended) (92555)     [] ES (un) (49568):       Electronically signed by: Antoinette Jackson PT, DPT, Ruth Damon #[id number]        Note: If patient does not return for scheduled/ recommended follow up visits, this note will serve as a discharge from care along with most recent update on progress.

## 2021-10-04 ENCOUNTER — HOSPITAL ENCOUNTER (OUTPATIENT)
Dept: PHYSICAL THERAPY | Age: 41
Setting detail: THERAPIES SERIES
Discharge: HOME | End: 2021-10-04
Payer: OTHER GOVERNMENT

## 2021-10-04 PROCEDURE — 97110 THERAPEUTIC EXERCISES: CPT

## 2021-10-04 NOTE — FLOWSHEET NOTE
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.42)  · Treatment Diagnosis: low back pain with sciatica, L shoulder pain with mobility deficits    Insurance/Certification information:  PT Insurance Information: Nish, pre-auth required, $21 co-pay    Physician Information:  Referring Practitioner: Nalini Thomas DO    Plan of care signed :  [x]  Yes  [] No   []  Cosign [x]  Fax 9/3/21     Date of Patient follow up with Physician:     Is this a Progress Report:     []  Yes  [x]  No      If Yes:  Date Range for reporting period:  Beginning 21  Ending       Progress report will be due (10 Rx or 30 days whichever is less):        Recertification will be due (POC Duration  / 90 days whichever is less):  visit      Visit # Insurance Allowable Auth Required   , ,  for new POC; 25 visits total 15 additional authorized on 9/3/21; 16 authorized [x]  Yes []  No        Functional Scale: GISSELLE      Date 21  Score: 27/50 = 54% disability  24/ 50 = 48%  26/50 = 52%   24/50 = 48%    Functional Scale: SPADI   21   Date 21      Score: 57/130 = 44% disability 67/130 = 52% disability 62/130 = 48% disability      Latex Allergy:  [x]NO      []YES  Preferred Language for Healthcare:   [x]English       []other:    RESTRICTIONS/PRECAUTIONS: previous hx of laminectomy     SUBJECTIVE:    Pt reports that his lower back was sore after his last PT session.  He also reports having increased soreness in his L shoulder after playing basketball over h    * sign for shoulder: picking up weighted items, horizontal abduction, abduction with arm at side, reaching overhead, especially with weighted items, putting hand behind his back    *sign for lower back: He reports that the shooting pain down his LEs is reproduced with bending forward to tie his shoes, turning/rotating to reach for items, and picking up items off of the ground    Pain level:  Not directly assessed today    Plan Moving Forward/ For next visit:    Progress Left Shoulder AROM and Strength Ex   Improve lumbar mobility, especially into flexion             OBJECTIVE:     Functional movements:  Hip hinge: no increase in pain  Lifting box from ground: mild increase in pain   Putting shoes on: painful, difficulty bending forward  Rolling on bed/mat: no increase in pain      Exercises/Interventions:     Exercises in bold performed in department today. Items not bolded are carried forward from prior visits for continuity of the record.   Exercise/Equipment Resistance/Repetitions HEP Other comments   Sidelying sciatic nerve glide in R sidelying   2 min []    Supine sciatic nerve glide  1 min 15 sec [x]    Prone press-up   2 min; 1 min each with LE pointed directionally [x]    Bridging with trunk rotation 3 x 8 reps  2x20 [x] UEs extended, holding ball    Standing hip hinge 12 reps     Bridge with sustained abd  RTB 3x15     Bridge with sustained ADD (Ball) 2x20     Lower trunk rotation 2 min     Lumbar roll stretch   1 min in L sidelying [x] Reviewed for HEP   Lumbar lateral glides   3 x 20-25 reps hips to L []    Recumbent bike   3 min, RPE 4-6/10 []    Elliptical  4 min  For warmup   SLR with contralateral arm extended  TA activation + supine marching   1 x 10 knees to chest, 2 x 10 holding 8# DB  2 x 12 _    Seated sciatic nerve glide  2 x 1 min _    Child's pose  Double knee to chest   Single knee to chest    1 min  3 x 30 sec   5 x 15 sec hold bilaterally _ Using towel to pull knee towards chest   Lumbar rotation dynamic stretch    1 min 30 sec _    _Modified standard plank  Side plank   2 x 15 sec  6 reps each side _ HOLD until L shoulder pain improves   Standing lumbar x 8-12, 4# DB in L UE  3 x 10, 7# on stick  Attempted, held due to catching in L shoulder   Standing scaption 2 x 6-10, 3# DB on L, 5# DB on R     Plank position L arm stationary, R arm moving - 2 x 20 sec     Landmine press  6# on stick - 24 reps (3 RIR), 9# on stick - 2 x 12     Shoulder horizontal abduction 2.5# - 2x8, 5# - 8 reps  Using cable machine   Sidelying shoulder flexion 3# - 7 reps (RIR 2),      Shoulder IR and extension AAROM 1 min, pulling towel behind back     Supine W's - moving into ER 1 min     Shoulder IR isometrics in 90/90 position 1 min     Sidelying shoulder ER/IR 2 x 8 reps, green theraband     [place] get up 3 x 8-10, 8#  Beginning of movement   DB Incline Bench Press 3 x 12-15, 8-10# DBs     Standing shoulder abduction      Prone shoulder flexion 3 x 10     Standing closed chain shoulder extension 1 min 30 sec  Holding doorjamb     Therapeutic Exercise/Home Exercise Program:   45 minutes  Pt inst in role of PT, prognosis, plan of care, use of CP/HP, activity modification, and benefits of therapy. HEP has been established, See above, pt given handout(s) of new exercises. Updated HEP given to pt today, reviewed with patient at end of session. See HEP exercises below. Provided RTB  *Reassessment performed today, see above. Therapist and pt discussed strategies for HEP performance (what activities have been most beneficial, when to perform exercises throughout the day, how often to perform exercise) at length to increase compliance with HEP and to determine best plan of action to help pt continue making functional progress. Pt and therapist discussed and developed exercise program for pt to perform at the gym. Access Code: [email or web address]  URL: [email or web address]  Date: 10/01/2021  Prepared by: Bong Sr    Exercises  Supine Figure 4 Piriformis Stretch - 1 x daily - 5-7 x weekly - 2-4 sets - 5-8 reps - 5-10 seconds hold  Standing Hip Hinge with Dowel - 1 x daily - 2-3 x weekly - 2-3 sets - 8-12 reps  Dumbbell Squat at Shoulders - 1 x daily - 2-3 x weekly - 3 sets - 10 reps  Standing Diagonal Chops with Medicine Ball - 1 x daily - 2-3 x weekly - 2-3 sets - 8-12 reps  Supine Double Knee to Chest - 1 x daily - 3-4 x weekly - 6-10 reps - 30 seconds hold      Access Code: [email or web address]  URL: [email or web address]  Date: 09/24/2021  Prepared by: Twan Proctor    Exercises  Push Up on Table - 1 x daily - 4-7 x weekly - 2-3 sets - 6-12 reps  Single Arm Scaption with Resistance - 1 x daily - 5-7 x weekly - 2-3 sets - 8-12 reps  Sidelying shoulder external rotation - patient took video of exercise  Supine W - patient took video of exercise           Therapeutic Activity:  0 minutes     Gait: 0 minutes    Neuromuscular Re-Education:  0 minutes      Canalith Repositioning Procedure:      Manual Therapy:  0 minutes   Neutral Alignment Today. Focus of manual tx shifted to Left Shoulder   Joint mobs GR III, IV : posterior , inferior ; distraction   PROM to shoulder flexion, Abd, IR, ER  PNF - rhythmic stabilization with shoulder flexion, abduction, ER/IR  STM/gentle massage to L periscapular muscles and lateral shoulder   Gentle manual distraction of shoulder with MFR unwinding. Improved ROM at end of session    Lumbar rotation with overpressure - both directions. Slightly decreased ROM with rotation to R. Unilateral PA mobs to lumbar spine - L1-5 bilaterally      Manual traction   L hip inferior mobilization + passive stretching into hip flexion  L hip lateral mobilization + passive stretching into hip IR  PNF agonist reversal L hip flexion/extension  Lumbar lateral glides, hips to L w/overpressure from therapist  MET to promote pelvic alignment/ stabilization in supine, sidelying. Long axis distraction       Modalities: 0 minutes  DC Mechanical Txn. Progress to core and pelvic strength exercises.   Mechanical lumbar traction, 15 minutes, Blue txn belts, 45 sec on/15 sec off, 57 kg pull on/28 kg pull off, pt LEs elevated on stool  Pt reported no increase in pain during activity. Felt stronger pull with blue belts. ASSESSMENT:    · Minimal to no increase in pain with strengthening activities at today's visit. Pt's functional capacity with exercise is continuing to improve. · Still has difficulty performing repeated forward bending      Goals:   Patient stated goal:  \"I would just like to be able to have less pain and more movement\" - 40% progress as of 07/19/21  [x]? Progressing: []? Met: []? Not Met: []? Adjusted     Therapist goals for Patient:   Short Term Goals: To be achieved in: 4 weeks  1. Independent in HEP and progression per patient tolerance, in order to prevent re-injury. []? Progressing: [x]? Met: []? Not Met: []? Adjusted  2. Patient's will have a decrease in pain to 4/10 on average to facilitate improvement in movement, function, and ADLs as indicated by Functional Deficits. []? Progressing: [x]? Met: []? Not Met: []? Adjusted     Long Term Goals: To be achieved in: 8 weeks  1. Disability index score of 40% or less for the GISSELLE to assist with improving functional mobility and capacity. [x]? Progressing: []? Met: []? Not Met: []? Adjusted  2. Patient will demonstrate increased lumbar AROM to 100% WNL with no LE symptoms. [x]? Progressing: []? Met: []? Not Met: []? Adjusted  3. Patient will demonstrate an increase in NM recruitment/activation and overall GH and scapular strength to 4+/5 or WNL for proper functional mobility as indicated by patients Functional Deficits. [x]? Progressing: nearing []? Met: []? Not Met: []? Adjusted  4. Patient will complete a 20 minute strength training workout with minimal to no low back pain. 0% progress as of 07/19/21  []? Progressing: []? Met: [x]? Not Met: []? Adjusted    Overall Progression Towards Functional goals/ Treatment Progress Update:  [] Patient is progressing as expected towards functional goals listed.     [x] Progression is slowed due to complexities/Impairments listed. [] Progression has been slowed due to co-morbidities. [] Plan just implemented, too soon to assess goals progression <30days   [] Goals require adjustment due to lack of progress  [] Patient is not progressing as expected and requires additional follow up with physician  [] Other    Prognosis for POC: [x] Good [] Fair  [] Poor    Patient requires continued skilled intervention: [x] Yes  [] No    Treatment/Activity Tolerance:  [x] Patient able to complete treatment  [] Patient limited by fatigue  [] Patient limited by pain    [] Patient limited by other medical complications  [] Other:         PLAN: See eval  [] Continue per plan of care [x] Alter current plan (see comments above) - extend current POC for 11 additional visits (31 total visits approved)  [] Plan of care initiated [] Hold pending MD visit [] Discharge        Therapeutic Exercise and NMR EXR  [x] (31050) Provided verbal/tactile cueing for activities related to strengthening, flexibility, endurance, ROM  for improvements in proximal strength and core control with self care, mobility, lifting and ambulation.  [] (39917) Provided verbal/tactile cueing for activities related to improving balance, coordination, kinesthetic sense, posture, motor skill, proprioception  to assist with core control in self care, mobility, lifting, and ambulation.      Therapeutic Activities and Gait:    [] (21977 or 90128) Provided verbal/tactile cueing for activities related to improving balance, coordination, kinesthetic sense, posture, motor skill, proprioception and motor activation to allow for proper function  with self care and ADLs  [] (34274) Provided training and instruction to the patient for proper core and proximal hip recruitment and positioning with ambulation re-education     Home Exercise Program:    [x] (40893) Reviewed/Progressed HEP activities related to strengthening, flexibility, endurance, ROM of core, proximal hip and LE for functional self-care, mobility, lifting and ambulation   [] (20118) Reviewed/Progressed HEP activities related to improving balance, coordination, kinesthetic sense, posture, motor skill, proprioception of core, proximal hip and LE for self care, mobility, lifting, and ambulation      Manual Treatments:  PROM / STM / Oscillations-Mobs:  G-I, II, III, IV (PA's, Inf., Post.)  [x] (04131) Provided manual therapy to mobilize proximal hip and LS spine soft tissue/joints for the purpose of modulating pain, promoting relaxation,  increasing ROM, reducing/eliminating soft tissue swelling/inflammation/restriction, improving soft tissue extensibility and allowing for proper ROM for normal function with self care, mobility, lifting and ambulation. Modalities:       Charges:  Timed Code Treatment Minutes: 45   Total Treatment Minutes: 45         [] EVAL  [x] KH(57763) x  3  [] IONTO  [] NMR (67004) x     [] VASO  [] Manual (10430) x      [] Other:  [] TA x     [] Gait x   [] [facility] (89911)  [] ES(attended) (68222)     [] ES (un) (43175):       Electronically signed by: Wilfrid Garcia PT, LULY, [address] #[id number]        Note: If patient does not return for scheduled/ recommended follow up visits, this note will serve as a discharge from care along with most recent update on progress.

## 2021-10-08 ENCOUNTER — HOSPITAL ENCOUNTER (OUTPATIENT)
Dept: PHYSICAL THERAPY | Age: 41
Setting detail: THERAPIES SERIES
Discharge: HOME | End: 2021-10-08
Payer: OTHER GOVERNMENT

## 2021-10-08 PROCEDURE — 97110 THERAPEUTIC EXERCISES: CPT

## 2021-10-08 NOTE — FLOWSHEET NOTE
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.42)  · Treatment Diagnosis: low back pain with sciatica, L shoulder pain with mobility deficits    Insurance/Certification information:  PT Insurance Information: , pre-auth required, $21 co-pay    Physician Information:  Referring Practitioner: Ej Johnson DO    Plan of care signed :  [x]  Yes  [] No   []  Cosign [x]  Fax 9/3/21     Date of Patient follow up with Physician:     Is this a Progress Report:     []  Yes  [x]  No      If Yes:  Date Range for reporting period:  Beginning 21  Ending       Progress report will be due (10 Rx or 30 days whichever is less):        Recertification will be due (POC Duration  / 90 days whichever is less):  visit      Visit # Insurance Allowable Auth Required   , ,  for new POC; 26 visits total 15 additional authorized on 9/3/21; 16 authorized [x]  Yes []  No        Functional Scale: GISSELLE      Date 21  Score: 27/50 = 54% disability  24/ 50 = 48%  26/50 = 52%   24/50 = 48%    Functional Scale: SPADI   21   Date 21      Score: 57/130 = 44% disability 67/130 = 52% disability 62/130 = 48% disability      Latex Allergy:  [x]NO      []YES  Preferred Language for Healthcare:   [x]English       []other:    RESTRICTIONS/PRECAUTIONS: previous hx of laminectomy     SUBJECTIVE:    No new complaints, pt reports that his L shoulder is not having as much pain today as at his last     * sign for shoulder: picking up weighted items, horizontal abduction, abduction with arm at side, reaching overhead, especially with weighted items, putting hand behind his back    *sign for lower back: He reports that the shooting pain down his LEs is reproduced with bending forward to tie his shoes, turning/rotating to reach for items, and picking up items off of the ground    Pain level:  3/10 pain    Plan Moving Forward/ For next visit:    Progress Left Shoulder AROM and Strength Ex   Improve lumbar mobility, especially into flexion             OBJECTIVE:     Functional movements:  Hip hinge: no increase in pain  Lifting box from ground: mild increase in pain   Putting shoes on: painful, difficulty bending forward  Rolling on bed/mat: no increase in pain      Exercises/Interventions:     Exercises in bold performed in department today. Items not bolded are carried forward from prior visits for continuity of the record.   Exercise/Equipment Resistance/Repetitions HEP Other comments   Sidelying sciatic nerve glide in R sidelying   2 min []    Supine sciatic nerve glide  1 min 15 sec [x]    Prone press-up   2 min; 1 min each with LE pointed directionally [x]    Bridging with trunk rotation 3 x 8 reps  2x20 [x] UEs extended, holding ball    Standing hip hinge 12 reps     Bridge with sustained abd  RTB 3x15     Bridge with sustained ADD (Ball) 2x20     Lower trunk rotation 2 min     Lumbar roll stretch   1 min in L sidelying [x] Reviewed for HEP   Lumbar lateral glides   3 x 20-25 reps hips to L []    Recumbent bike   3 min, RPE 4-6/10 []    Elliptical  3 x 1 min  For warmup   SLR with contralateral arm extended  TA activation + supine marching   1 x 10 knees to chest, 2 x 10 holding 8# DB  2 x 12 _    Seated sciatic nerve glide  2 x 1 min _    Child's pose  Double knee to chest   Single knee to chest    2 min  3 x 30 sec   5 x 15 sec hold bilaterally _ Using towel to pull knee towards chest   Lumbar rotation dynamic stretch    1 min 30 sec _    _Standard plank on knees  Side plank   3 x 15 sec  6 reps each side _    Standing lumbar flexion 1 min  Walking hands down legs, increased pain with activity   Seated lumbar flexion  2 min _ In pain-free ROM   Forward lunge with rotation 8 reps, holding 6 kg ball _    Goblet squat with slow eccentric   2 x 12, holding 10# weight _    Figure 4 stretch with lumbar rotation  2 min _ Symptoms down L LE after 1 min 30 sec   Standing Hip Abduction  3x10   9#     Lateral step out/ squats with Glute squeeze 6 length of // bars  9#     Standing Hip Extension Taps    3x10  9# _    Mule Kick  3x10 9#     Suitcase carry 2 sets of 4 x 50 ft trips, 30# in bucket     Walking hamstring stretch (forward bending)  Standing forward bending 2 min     Lifting bucket from elevated surface  Lifting box from elevated surface 3 x 10, 16-20# in bucket, 6\" box  2 x 10, 5# in box, on 4\" box     Standing trunk rotation   Standing diagonal chop 3 x 8 reps, 7.5#  2 x 10, 7.5#  In split stance   Standing Pallof Press 2 x 12, 5#  Using cable machine   Prone trunk extension 2 x 12  Partial ROM, no assist of hands   Treadmill 5 min at comfortable walking speed  For warmup   Standing lumbar extension 10 reps, 10 reps with hands on wall  Pt complaints of tightness/reduced ROM in lower back   SHOULDER COMPLEX      AAROM in supine x10     Sleeper stretch x10 5 sec hold     Mid Row  25# - 2 x 10   2x15  Using cable machine   Row Row  2x15     Scifit 4 min, lvl 2  2 x 1 min 30 sec, lvl 2. 5     Pulley 1 min flexion, 1 min abduction     Shoulder abduction isometric 3 x 1 min     Wall semi-circles 1 min each direction     Wall slides 1 min each - CW, CCW; 1# AW on wrist  2 x 1 min flexion, 1 min scaption     Bent over row 15# - 12 reps (RPE 7), 17.5# - 2 x 8-12  2 x 8-12 reps 20# in bucket  using cable machine   Sidelying shoulder abduction AMRAP with 2# - 15 reps, 5 RIR      Standing shoulder IR 10# - 17 reps, 12.5# - 2 x 8-12  On cable machine   Standing shoulder ER 5# - 10 reps (RPE 7), 7.5# - 9 reps (RPE 9)  On cable machine   's carry 3 rounds X 1 trip each arm 5# DB     Modified push up  Push up to table 1 min - 9 reps; 1 min - 3-4 reps  10 reps  Reviewed for HEP   OH Press 2 x 8-12, 4# DB in L UE  3 x 10, 7# on stick  Attempted, held due to catching in L shoulder   Standing scaption 2 x 6-10, 3# DB on L, 5# DB on R     Plank position L arm stationary, R arm moving - 2 x 20 sec     Landmine press  6# on stick - 24 reps (3 RIR), 9# on stick - 2 x 12     Shoulder horizontal abduction 2.5# - 2x8, 5# - 8 reps  Using cable machine   Sidelying shoulder flexion 3# - 7 reps (RIR 2),      Shoulder IR and extension AAROM 1 min, pulling towel behind back     Supine W's - moving into ER 1 min     Shoulder IR isometrics in 90/90 position 1 min     Sidelying shoulder ER/IR 2 x 8 reps, green theraband     [place] get up 3 x 8-10, 8#  Beginning of movement   DB Incline Bench Press 3 x 12-15, 8-10# DBs     Standing shoulder abduction      Prone shoulder flexion 3 x 10     Standing closed chain shoulder extension 1 min 30 sec  Holding doorjamb     Therapeutic Exercise/Home Exercise Program:   45 minutes  Pt inst in role of PT, prognosis, plan of care, use of CP/HP, activity modification, and benefits of therapy. HEP has been established, See above, pt given handout(s) of new exercises. Updated HEP given to pt today, reviewed with patient at end of session. See HEP exercises below. *Reassessment performed today, see above. Therapist and pt discussed strategies for HEP performance (what activities have been most beneficial, when to perform exercises throughout the day, how often to perform exercise) at length to increase compliance with HEP and to determine best plan of action to help pt continue making functional progress. Pt and therapist discussed and developed exercise program for pt to perform at the gym. Access Code: [email or web address]  URL: [email or web address]  Date: 10/08/2021  Prepared by: Bong Sr    Exercises  Supine Figure 4 Piriformis Stretch - 1 x daily - 5-7 x weekly - 2-4 sets - 5-8 reps - 5-10 seconds hold  Dumbbell Squat at Shoulders - 1 x daily - 2-3 x weekly - 3 sets - 10 reps  Supine Double Knee to Chest - 1 x daily - 3-4 x weekly - 6-10 reps - 30 seconds hold  Plank on Knees - 1 x daily - 4-5 x weekly - 3-8 reps - 15 seconds hold  Lunge with Rotation and Medicine Ball - 1 x daily - 3-5 x weekly - 2-3 sets - 8-12 reps        Access Code: [email or web address]  URL: [email or web address]  Date: 09/24/2021  Prepared by: Twan Proctor    Exercises  Push Up on Table - 1 x daily - 4-7 x weekly - 2-3 sets - 6-12 reps  Single Arm Scaption with Resistance - 1 x daily - 5-7 x weekly - 2-3 sets - 8-12 reps  Sidelying shoulder external rotation - patient took video of exercise  Supine W - patient took video of exercise           Therapeutic Activity:  0 minutes     Gait: 0 minutes    Neuromuscular Re-Education:  0 minutes      Canalith Repositioning Procedure:      Manual Therapy:  0 minutes   Neutral Alignment Today. Focus of manual tx shifted to Left Shoulder   Joint mobs GR III, IV : posterior , inferior ; distraction   PROM to shoulder flexion, Abd, IR, ER  PNF - rhythmic stabilization with shoulder flexion, abduction, ER/IR  STM/gentle massage to L periscapular muscles and lateral shoulder   Gentle manual distraction of shoulder with MFR unwinding. Improved ROM at end of session    Lumbar rotation with overpressure - both directions. Slightly decreased ROM with rotation to R. Unilateral PA mobs to lumbar spine - L1-5 bilaterally      Manual traction   L hip inferior mobilization + passive stretching into hip flexion  L hip lateral mobilization + passive stretching into hip IR  PNF agonist reversal L hip flexion/extension  Lumbar lateral glides, hips to L w/overpressure from therapist  MET to promote pelvic alignment/ stabilization in supine, sidelying. Long axis distraction       Modalities: 0 minutes  DC Mechanical Txn. Progress to core and pelvic strength exercises.   Mechanical lumbar traction, 15 minutes, Blue txn belts, 45 sec on/15 sec off, 57 kg pull on/28 kg pull off, pt LEs elevated on stool  Pt reported no increase in pain during activity. Felt stronger pull with blue belts. ASSESSMENT:    · Pt tolerated today's session well. He is still limited in his mobility with hip flexion but is improving. Pt is also tolerating loading of his L shoulder in closed chain without pain. Goals:   Patient stated goal:  \"I would just like to be able to have less pain and more movement\" - 40% progress as of 07/19/21  [x]? Progressing: []? Met: []? Not Met: []? Adjusted     Therapist goals for Patient:   Short Term Goals: To be achieved in: 4 weeks  1. Independent in HEP and progression per patient tolerance, in order to prevent re-injury. []? Progressing: [x]? Met: []? Not Met: []? Adjusted  2. Patient's will have a decrease in pain to 4/10 on average to facilitate improvement in movement, function, and ADLs as indicated by Functional Deficits. []? Progressing: [x]? Met: []? Not Met: []? Adjusted     Long Term Goals: To be achieved in: 8 weeks  1. Disability index score of 40% or less for the GISSELLE to assist with improving functional mobility and capacity. [x]? Progressing: []? Met: []? Not Met: []? Adjusted  2. Patient will demonstrate increased lumbar AROM to 100% WNL with no LE symptoms. [x]? Progressing: []? Met: []? Not Met: []? Adjusted  3. Patient will demonstrate an increase in NM recruitment/activation and overall GH and scapular strength to 4+/5 or WNL for proper functional mobility as indicated by patients Functional Deficits. [x]? Progressing: nearing []? Met: []? Not Met: []? Adjusted  4. Patient will complete a 20 minute strength training workout with minimal to no low back pain. 0% progress as of 07/19/21  []? Progressing: []? Met: [x]? Not Met: []? Adjusted    Overall Progression Towards Functional goals/ Treatment Progress Update:  [] Patient is progressing as expected towards functional goals listed. [x] Progression is slowed due to complexities/Impairments listed. [] Progression has been slowed due to co-morbidities. [] Plan just implemented, too soon to assess goals progression <30days   [] Goals require adjustment due to lack of progress  [] Patient is not progressing as expected and requires additional follow up with physician  [] Other    Prognosis for POC: [x] Good [] Fair  [] Poor    Patient requires continued skilled intervention: [x] Yes  [] No    Treatment/Activity Tolerance:  [x] Patient able to complete treatment  [] Patient limited by fatigue  [] Patient limited by pain    [] Patient limited by other medical complications  [] Other:         PLAN: See eval  [] Continue per plan of care [x] Alter current plan (see comments above) - extend current POC for 11 additional visits (31 total visits approved)  [] Plan of care initiated [] Hold pending MD visit [] Discharge        Therapeutic Exercise and NMR EXR  [x] (11215) Provided verbal/tactile cueing for activities related to strengthening, flexibility, endurance, ROM  for improvements in proximal strength and core control with self care, mobility, lifting and ambulation.  [] (20915) Provided verbal/tactile cueing for activities related to improving balance, coordination, kinesthetic sense, posture, motor skill, proprioception  to assist with core control in self care, mobility, lifting, and ambulation.      Therapeutic Activities and Gait:    [] (57361 or [id number]) Provided verbal/tactile cueing for activities related to improving balance, coordination, kinesthetic sense, posture, motor skill, proprioception and motor activation to allow for proper function  with self care and ADLs  [] (53395) Provided training and instruction to the patient for proper core and proximal hip recruitment and positioning with ambulation re-education     Home Exercise Program:    [x] (85509) Reviewed/Progressed HEP activities related to strengthening, flexibility,

## 2021-10-11 ENCOUNTER — APPOINTMENT (OUTPATIENT)
Dept: PHYSICAL THERAPY | Age: 41
End: 2021-10-11
Payer: OTHER GOVERNMENT

## 2021-10-22 ENCOUNTER — HOSPITAL ENCOUNTER (OUTPATIENT)
Dept: PHYSICAL THERAPY | Age: 41
Setting detail: THERAPIES SERIES
Discharge: HOME | End: 2021-10-22
Payer: OTHER GOVERNMENT

## 2021-10-22 PROCEDURE — 97110 THERAPEUTIC EXERCISES: CPT

## 2021-10-22 PROCEDURE — 97530 THERAPEUTIC ACTIVITIES: CPT

## 2021-10-22 PROCEDURE — 97140 MANUAL THERAPY 1/> REGIONS: CPT

## 2021-10-25 ENCOUNTER — APPOINTMENT (OUTPATIENT)
Dept: PHYSICAL THERAPY | Age: 41
End: 2021-10-25
Payer: OTHER GOVERNMENT

## 2021-10-29 ENCOUNTER — HOSPITAL ENCOUNTER (OUTPATIENT)
Dept: PHYSICAL THERAPY | Age: 41
Setting detail: THERAPIES SERIES
Discharge: HOME | End: 2021-10-29
Payer: OTHER GOVERNMENT

## 2021-10-29 PROCEDURE — 97110 THERAPEUTIC EXERCISES: CPT

## 2021-10-29 PROCEDURE — 97140 MANUAL THERAPY 1/> REGIONS: CPT

## 2021-10-29 NOTE — FLOWSHEET NOTE
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.42)  · Treatment Diagnosis: low back pain with sciatica, L shoulder pain with mobility deficits    Insurance/Certification information:  PT Insurance Information: , pre-auth required, $21 co-pay    Physician Information:  Referring Practitioner: Timothy Mobley DO    Plan of care signed :  [x]  Yes  [] No   []  Cosign [x]  Fax 9/3/21     Date of Patient follow up with Physician:     Is this a Progress Report:     []  Yes  [x]  No      If Yes:  Date Range for reporting period:  Beginning   Ending       Progress report will be due (10 Rx or 30 days whichever is less):     Recertification will be due (POC Duration  / 90 days whichever is less):  visit      Visit # Insurance Allowable Auth Required   , ,  for new POC; 28 visits total 15 additional authorized on 9/3/21; 16 authorized [x]  Yes []  No        Functional Scale: GISSELLE      Date 6/7/21    7/9/21   7/19/21  8/30/21  10/22/21  Score: 27/50 = 54% disability  24/ 50 = 48%  26/50 = 52%   24/50 = 48%  22/50 = 44%    Functional Scale: SPADI   7/19/21   8/30/21   10/22/21   Date 21      Score: 57/130 = 44% disability 67/130 = 52% disability 62/130 = 48% disability 23/130 = 29% disability      Latex Allergy:  [x]NO      []YES  Preferred Language for Healthcare:   [x]English       []other:    RESTRICTIONS/PRECAUTIONS: previous hx of laminectomy     SUBJECTIVE:    · Reports that his exercises are getting easier  · Says that he feels \"as stuck as ever\" when it comes to mobility  · Overall, says that his back \"feels about the same\"    * sign for shoulder: picking up weighted items, horizontal abduction, abduction with arm at side, reaching overhead, especially with weighted items, putting hand behind his back    *sign for lower back: He reports that the shooting pain down his LEs is reproduced with bending forward to tie his shoes, turning/rotating to reach for items, and picking up items off of the ground    Pain level:  4/10 pain - lower back, 2/10 L shoulder    Plan Moving Forward/ For next visit:    Progress Left Shoulder AROM and Strength Ex   Improve lumbar mobility, especially into flexion        OBJECTIVE:     Limited in L lateral lumbar flexion  Limited in lumbar forward flexion    Exercises/Interventions:     Exercises in bold performed in department today. Items not bolded are carried forward from prior visits for continuity of the record.   Exercise/Equipment Resistance/Repetitions HEP Other comments   Sidelying sciatic nerve glide in R sidelying   2 min []    Supine sciatic nerve glide  1 min 15 sec [x]    Prone press-up   2 min; 1 min each with LE pointed directionally [x]    Bridging with trunk rotation 3 x 8 reps  2x20 [x] UEs extended, holding ball    Standing hip hinge 12 reps     Bridge with sustained abd  RTB 3x15     Bridge with sustained ADD (Ball) 2x20     Lower trunk rotation 2 min     Lumbar roll stretch   1 min in L sidelying [x] Reviewed for HEP   Lumbar lateral glides   2 x 20-25 reps hips to L []    Recumbent bike   3 min, RPE 4-6/10 []    Elliptical  2 x 2 min  For warmup   SLR with contralateral arm extended  TA activation + supine marching   1 x 10 knees to chest, 2 x 10 holding 8# DB  2 x 12 _    Seated sciatic nerve glide  2 x 1 min _    Child's pose  Double knee to chest   Single knee to chest    2 min  3 x 30 sec   5 x 15 sec hold bilaterally _ Using towel to pull knee towards chest   Lumbar rotation dynamic stretch    1 min 30 sec _    _Standard plank on knees  Side plank   3 x 15 sec  6 reps each side _    Standing lumbar flexion with rotation 2 x 4 reps each direction     Seated lumbar flexion  2 min _ In pain-free ROM   Lumbar extension, torso on SB 2 x 8 reps     Forward lunge  Forward lunge with rotation 8 reps, holding 10# in L hand  10 reps, holding 3 kg ball _    Goblet squat with slow eccentric   2 x 12, holding 10# weight _    Figure 4 stretch with lumbar rotation  2 min _ Symptoms down L LE after 1 min 30 sec   Standing Hip Abduction  3x10   9#     Lateral step out/ squats with Glute squeeze 6 length of // bars  9#     Standing Hip Extension Taps    3x10  9# _    Mule Kick  3x10 9#     Suitcase carry 2 sets of 4 x 50 ft trips, 30# in bucket     Walking hamstring stretch (forward bending)  Standing forward bending 2 x 2 trips length of gym     Lifting bucket from elevated surface  Lifting box from elevated surface 3 x 10, 16-20# in bucket, 6\" box  2 x 12, 10# in box, on 6\" box     Standing trunk rotation   Standing diagonal chop 3 x 8 reps, 7.5#  2 x 10, 7.5#  In split stance   Standing Pallof Press 2 x 12, 5#  Using cable machine   Prone trunk extension 2 x 12  Partial ROM, no assist of hands   Treadmill 5 min at comfortable walking speed  For warmup   Standing lumbar extension 10 reps, 10 reps with hands on wall  Pt complaints of tightness/reduced ROM in lower back   SHOULDER COMPLEX      AAROM in supine x10     Sleeper stretch x10 5 sec hold     Mid Row  25# - 2 x 10   2x15  Using cable machine   Row Row  2x15     Scifit 4 min, lvl 2  2 x 1 min 30 sec, lvl 2. 5     Pulley 1 min flexion, 1 min abduction     Shoulder abduction isometric 3 x 1 min     Wall semi-circles 1 min each direction     Wall slides 1 min each - CW, CCW; 1# AW on wrist  2 x 1 min flexion, 1 min scaption     Bent over row 15# - 12 reps (RPE 7), 17.5# - 2 x 8-12  2 x 8-12 reps 20# in bucket  using cable machine   Sidelying shoulder abduction AMRAP with 2# - 15 reps, 5 RIR      Standing shoulder IR 10# - 17 reps, 12.5# - 2 x 8-12  On cable machine   Standing shoulder ER 5# - 10 reps (RPE 7), 7.5# - 9 reps (RPE 9)  On cable machine 's carry 3 rounds X 1 trip each arm 5# DB     Modified push up  Push up to table 1 min - 9 reps; 1 min - 3-4 reps  10 reps  Reviewed for HEP   OH Press 2 x 8-12, 4# DB in L UE  3 x 10, 7# on stick  Attempted, held due to catching in L shoulder   Standing scaption 2 x 6-10, 3# DB on L, 5# DB on R     Plank position L arm stationary, R arm moving - 2 x 20 sec     Landmine press  6# on stick - 24 reps (3 RIR), 9# on stick - 2 x 12     Shoulder horizontal abduction 2.5# - 2x8, 5# - 8 reps  Using cable machine   Sidelying shoulder flexion 3# - 7 reps (RIR 2),      Shoulder IR and extension AAROM 1 min, pulling towel behind back     Supine W's - moving into ER 1 min     Shoulder IR isometrics in 90/90 position 1 min     Sidelying shoulder ER/IR 2 x 8 reps, green theraband     Citizen of [place] get up 3 x 8-10, 8#  Beginning of movement   DB Incline Bench Press 3 x 12-15, 8-10# DBs     Standing shoulder abduction      Prone shoulder flexion 3 x 10     Standing closed chain shoulder extension 1 min 30 sec  Holding doorjamb     Therapeutic Exercise/Home Exercise Program:   27 minutes  Pt inst in role of PT, prognosis, plan of care, use of CP/HP, activity modification, and benefits of therapy. HEP has been established, See above, pt given handout(s) of new exercises. Updated HEP given to pt today, reviewed with patient at end of session. See HEP exercises below. *Reassessment performed today, see above. Therapist and pt discussed strategies for HEP performance (what activities have been most beneficial, when to perform exercises throughout the day, how often to perform exercise) at length to increase compliance with HEP and to determine best plan of action to help pt continue making functional progress. Pt and therapist discussed and developed exercise program for pt to perform at the gym. Access Code: [email or web address]  URL: [email or web address]  Date: 10/29/2021  Prepared by: Blaze Mccoy Scheffter    Exercises  Supine Figure 4 Piriformis Stretch - 1 x daily - 5-7 x weekly - 2-4 sets - 5-8 reps - 5-10 seconds hold  Dumbbell Squat at Shoulders - 1 x daily - 2-3 x weekly - 3 sets - 10 reps  Supine Double Knee to Chest - 1 x daily - 3-4 x weekly - 6-10 reps - 30 seconds hold  Plank on Knees - 1 x daily - 4-5 x weekly - 3-8 reps - 15 seconds hold  Lunge with Rotation and Medicine Ball - 1 x daily - 3-5 x weekly - 2-3 sets - 8-12 reps  Deep Squat with Arms Overhead - 1 x daily - 5-7 x weekly - 8-15 reps - 10-20 seconds hold  Left Standing Lateral Shift Correction at Wall - Repetitions - 3-4 x daily - 5-7 x weekly - 20-25 reps          Access Code: [email or web address]  URL: [email or web address]  Date: 09/24/2021  Prepared by: Brunilda Mckay    Exercises  Push Up on Table - 1 x daily - 4-7 x weekly - 2-3 sets - 6-12 reps  Single Arm Scaption with Resistance - 1 x daily - 5-7 x weekly - 2-3 sets - 8-12 reps  Sidelying shoulder external rotation - patient took video of exercise  Supine W - patient took video of exercise           Therapeutic Activity:  0 minutes  *Reassessment performed today - time taken to update subjective goals, assess objective measures such as strength, ROM, balance, and discuss results of reassessment with patient. See objective measures above. Gait: 0 minutes    Neuromuscular Re-Education:  0 minutes      Canalith Repositioning Procedure:      Manual Therapy:  18 minutes   Neutral Alignment Today. Focus of manual tx shifted to Left Shoulder   Joint mobs GR III, IV : posterior , inferior ; distraction   PROM to shoulder flexion, Abd, IR, ER  PNF - rhythmic stabilization with shoulder flexion, abduction, ER/IR  STM/gentle massage to L periscapular muscles and lateral shoulder   Gentle manual distraction of shoulder with MFR unwinding. Improved ROM at end of session    Lumbar rotation with overpressure - both directions.    Unilateral PA mobs to lumbar spine - L1-5 bilaterally  Stretching into double knees to chest  Lumbar side gliding to L with overpressure from therapist      Manual traction   L hip inferior mobilization + passive stretching into hip flexion  L hip lateral mobilization + passive stretching into hip IR  PNF agonist reversal L hip flexion/extension  Lumbar lateral glides, hips to L w/overpressure from therapist  MET to promote pelvic alignment/ stabilization in supine, sidelying. Long axis distraction       Modalities: 0 minutes  DC Mechanical Txn. Progress to core and pelvic strength exercises. Mechanical lumbar traction, 15 minutes, Blue txn belts, 45 sec on/15 sec off, 57 kg pull on/28 kg pull off, pt LEs elevated on stool  Pt reported no increase in pain during activity. Felt stronger pull with blue belts. ASSESSMENT:    · Improved lumbar mobility following double knees to chest and side gliding activities. · Pt fatigued at end of session but is showing improved capacity to perform strengthening activities for lumbar spine      Goals:   Patient stated goal:  \"I would just like to be able to have less pain and more movement\" - 80% progress for shoulder, 60% for lower back as of 10/22/21  [x]? Progressing: []? Met: []? Not Met: []? Adjusted     Therapist goals for Patient:   Short Term Goals: To be achieved in: 4 weeks  1. Independent in HEP and progression per patient tolerance, in order to prevent re-injury. []? Progressing: [x]? Met: []? Not Met: []? Adjusted  2. Patient's will have a decrease in pain to 4/10 on average to facilitate improvement in movement, function, and ADLs as indicated by Functional Deficits. []? Progressing: [x]? Met: []? Not Met: []? Adjusted     Long Term Goals: To be achieved in: 8 weeks  1. Disability index score of 40% or less for the GISSELLE to assist with improving functional mobility and capacity. [x]? Progressing: []? Met: []? Not Met: []? Adjusted  2.  Patient will demonstrate increased lumbar AROM to 100% WNL with no LE symptoms. [x]? Progressing: []? Met: []? Not Met: []? Adjusted  3. Patient will demonstrate an increase in NM recruitment/activation and overall GH and scapular strength to 4+/5 or WNL for proper functional mobility as indicated by patients Functional Deficits. []? Progressing:  [x]? Met: []? Not Met: []? Adjusted  4. Patient will complete a 20 minute strength training workout with minimal to no low back pain. 20-30% progress as of 10/22/21  [x]? Progressing: []? Met: []? Not Met: []? Adjusted    Overall Progression Towards Functional goals/ Treatment Progress Update:  [] Patient is progressing as expected towards functional goals listed. [x] Progression is slowed due to complexities/Impairments listed. [] Progression has been slowed due to co-morbidities.   [] Plan just implemented, too soon to assess goals progression <30days   [] Goals require adjustment due to lack of progress  [] Patient is not progressing as expected and requires additional follow up with physician  [] Other    Prognosis for POC: [x] Good [] Fair  [] Poor    Patient requires continued skilled intervention: [x] Yes  [] No    Treatment/Activity Tolerance:  [x] Patient able to complete treatment  [] Patient limited by fatigue  [] Patient limited by pain    [] Patient limited by other medical complications  [] Other:         PLAN: See eval  [x] Continue per plan of care [] Alter current plan (see comments above) - extend current POC for 11 additional visits (31 total visits approved)  [] Plan of care initiated [] Hold pending MD visit [] Discharge        Therapeutic Exercise and NMR EXR  [x] (36332) Provided verbal/tactile cueing for activities related to strengthening, flexibility, endurance, ROM  for improvements in proximal strength and core control with self care, mobility, lifting and ambulation.  [] (42358) Provided verbal/tactile cueing for activities related to improving balance, coordination, kinesthetic sense, posture, motor skill, proprioception  to assist with core control in self care, mobility, lifting, and ambulation. Therapeutic Activities and Gait:    [x] (24859 or 42820) Provided verbal/tactile cueing for activities related to improving balance, coordination, kinesthetic sense, posture, motor skill, proprioception and motor activation to allow for proper function  with self care and ADLs  [] (36018) Provided training and instruction to the patient for proper core and proximal hip recruitment and positioning with ambulation re-education     Home Exercise Program:    [x] (23668) Reviewed/Progressed HEP activities related to strengthening, flexibility, endurance, ROM of core, proximal hip and LE for functional self-care, mobility, lifting and ambulation   [] (32292) Reviewed/Progressed HEP activities related to improving balance, coordination, kinesthetic sense, posture, motor skill, proprioception of core, proximal hip and LE for self care, mobility, lifting, and ambulation      Manual Treatments:  PROM / STM / Oscillations-Mobs:  G-I, II, III, IV (PA's, Inf., Post.)  [x] (97023) Provided manual therapy to mobilize proximal hip and LS spine soft tissue/joints for the purpose of modulating pain, promoting relaxation,  increasing ROM, reducing/eliminating soft tissue swelling/inflammation/restriction, improving soft tissue extensibility and allowing for proper ROM for normal function with self care, mobility, lifting and ambulation.      Modalities:       Charges:  Timed Code Treatment Minutes: 45   Total Treatment Minutes: 45         [] EVAL  [x] KH(91183) x  2  [] IONTO  [] NMR (71487) x     [] VASO  [x] Manual (42618) x 1     [] Other:  [] TA x     [] Gait x   [] Mech Traction (51857)  [] ES(attended) (46605)     [] ES (un) (60216):       Electronically signed by: Rahsad Mccullough PT, DPT, Devin Malcolm #[id number]        Note: If patient does not return for scheduled/ recommended follow up visits, this note will serve as a discharge from care along with most recent update on progress.

## 2021-11-01 ENCOUNTER — APPOINTMENT (OUTPATIENT)
Dept: PHYSICAL THERAPY | Age: 41
End: 2021-11-01
Payer: OTHER GOVERNMENT

## 2021-11-05 ENCOUNTER — HOSPITAL ENCOUNTER (OUTPATIENT)
Dept: PHYSICAL THERAPY | Age: 41
Setting detail: THERAPIES SERIES
Discharge: HOME | End: 2021-11-05
Payer: OTHER GOVERNMENT

## 2021-11-05 PROCEDURE — 97110 THERAPEUTIC EXERCISES: CPT

## 2021-11-05 NOTE — FLOWSHEET NOTE
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.42)  · Treatment Diagnosis: low back pain with sciatica, L shoulder pain with mobility deficits    Insurance/Certification information:  PT Insurance Information: [employer], pre-auth required, $21 co-pay    Physician Information:  Referring Practitioner: Gardenia Anderson DO    Plan of care signed :  [x]  Yes  [] No   []  Cosign [x]  Fax 9/3/21     Date of Patient follow up with Physician:     Is this a Progress Report:     []  Yes  [x]  No      If Yes:  Date Range for reporting period:  Beginning   Ending       Progress report will be due (10 Rx or 30 days whichever is less):     Recertification will be due (POC Duration  / 90 days whichever is less): 31 visit      Visit # Insurance Allowable Auth Required   , ,  for new POC; 29 visits total 15 additional authorized on 9/3/21; 16 authorized [x]  Yes []  No        Functional Scale: GISSELLE      Date 6/7/21    7/9/21   7/19/21  8/30/21  10/22/21  Score: 27/50 = 54% disability  24/ 50 = 48%  26/50 = 52%   24/50 = 48%  22/50 = 44%    Functional Scale: SPADI   7/19/21   8/30/21   10/22/21   Date 21      Score: 57/130 = 44% disability 67/130 = 52% disability 62/130 = 48% disability 23/130 = 29% disability      Latex Allergy:  [x]NO      []YES  Preferred Language for Healthcare:   [x]English       []other:    RESTRICTIONS/PRECAUTIONS: previous hx of laminectomy     SUBJECTIVE:    · Pt reports that his lower back and LEs feel about the same, the pain continues to be constant. Says that his L shoulder has been feeling better.   · Says that he has gone to the gym once this week, will try to start going 2x/week       * sign for shoulder: picking up weighted items, horizontal abduction, abduction with arm at side, reaching overhead, especially with weighted items, putting hand behind his back    *sign for lower back: He reports that the shooting pain down his LEs is reproduced with bending forward to tie his shoes, turning/rotating to reach for items, and picking up items off of the ground    Pain level:  4/10 pain - lower back, 2/10 L shoulder    Plan Moving Forward/ For next visit:    Progress Left Shoulder AROM and Strength Ex   Improve lumbar mobility, especially into flexion        OBJECTIVE:     Exercises/Interventions:     Exercises in bold performed in department today. Items not bolded are carried forward from prior visits for continuity of the record.   Exercise/Equipment Resistance/Repetitions HEP Other comments   Sidelying sciatic nerve glide in R sidelying   2 min []    Supine sciatic nerve glide  1 min 15 sec [x]    Prone press-up   2 min; 1 min each with LE pointed directionally [x]    Bridging with trunk rotation 3 x 8 reps  2x20 [x] UEs extended, holding ball    Standing hip hinge 12 reps     Bridge with sustained abd  RTB 3x15     Bridge with sustained ADD (Ball) 2x20     Lower trunk rotation 1 min each direction     Lumbar roll stretch   1 min in L sidelying [x] Reviewed for HEP   Lumbar lateral glides   2 x 20-25 reps hips to L []    Recumbent bike   3 min, RPE 4-6/10 []    Elliptical  2 x 2 min  For warmup   SLR with contralateral arm extended  TA activation + supine marching   1 x 10 knees to chest, 2 x 10 holding 8# DB  2 x 12 _    Seated sciatic nerve glide  2 x 1 min _    Child's pose  Double knee to chest   Single knee to chest    2 min  3 x 30 sec   5 x 15 sec hold bilaterally _ Using towel to pull knee towards chest   Lumbar rotation dynamic stretch    1 min 30 sec _    _Standard plank on knees  Side plank   3 x 15 sec  6 reps each side _    Standing lumbar flexion with rotation 2 x 4 reps each direction     Seated lumbar flexion  2 min _ In 's carry 3 rounds X 1 trip each arm 5# DB     Modified push up  Push up to table 1 min - 9 reps; 1 min - 3-4 reps  10 reps  Reviewed for HEP   OH Press 2 x 8-12, 4# DB in L UE  3 x 10, 7# on stick  Attempted, held due to catching in L shoulder   Standing scaption 2 x 6-10, 3# DB on L, 5# DB on R     Plank position L arm stationary, R arm moving - 2 x 20 sec     Landmine press  6# on stick - 24 reps (3 RIR), 9# on stick - 2 x 12     Shoulder horizontal abduction 2.5# - 2x8, 5# - 8 reps  Using cable machine   Sidelying shoulder flexion 3# - 7 reps (RIR 2),      Shoulder IR and extension AAROM 1 min, pulling towel behind back     Supine W's - moving into ER 1 min     Shoulder IR isometrics in 90/90 position 1 min     Sidelying shoulder ER/IR 2 x 8 reps, green theraband     [place] get up 3 x 8-10, 8#  Beginning of movement   DB Incline Bench Press 3 x 12-15, 8-10# DBs     Standing shoulder abduction      Prone shoulder flexion 3 x 10     Standing closed chain shoulder extension 1 min 30 sec  Holding doorjamb     Therapeutic Exercise/Home Exercise Program:   40 minutes  Pt inst in role of PT, prognosis, plan of care, use of CP/HP, activity modification, and benefits of therapy. HEP has been established, See above, pt given handout(s) of new exercises. Updated HEP given to pt today, reviewed with patient at end of session. See HEP exercises below. *Reassessment performed today, see above. Therapist and pt discussed strategies for HEP performance (what activities have been most beneficial, when to perform exercises throughout the day, how often to perform exercise) at length to increase compliance with HEP and to determine best plan of action to help pt continue making functional progress. Pt and therapist discussed and developed exercise program for pt to perform at the gym. Access Code: [email or web address]  URL: [email or web address]  Date: 11/05/2021  Prepared by: Scott Salomon Scheffter    Exercises  Supine Figure 4 Piriformis Stretch - 1 x daily - 5-7 x weekly - 2-4 sets - 5-8 reps - 5-10 seconds hold  Supine Double Knee to Chest - 1 x daily - 5-7 x weekly - 6-10 reps - 30 seconds hold  Deep Squat with Arms Overhead - 1 x daily - 5-7 x weekly - 8-15 reps - 10-20 seconds hold  Left Standing Lateral Shift Correction at Wall - Repetitions - 3-4 x daily - 5-7 x weekly - 20-25 reps  Supine Lower Trunk Rotation - 1 x daily - 5-7 x weekly - 1-2 sets - 5-8 reps - 10-15 seconds hold  Quadruped Pelvic Rotation - 1 x daily - 5-7 x weekly - 1-2 sets - 10 reps - 10 seconds hold      Access Code: [email or web address]  URL: [email or web address]  Date: 11/05/2021  Prepared by: Jacob Gallegos    Exercises  Kettlebell Deadlift - 1 x daily - 2-3 x weekly - 2-3 sets - 6-12 reps  Standing Diagonal Chop - 1 x daily - 2-3 x weekly - 2-3 sets - 6-12 reps - 5-15 pounds  Lunge with Rotation and Medicine Ball - 1 x daily - 2-3 x weekly - 2-3 sets - 6-10 reps - 3-10 pounds  Dumbbell Squat at Shoulders - 1 x daily - 2-3 x weekly - 2-3 sets - 6-12 reps  Side Plank on Knees - 1 x daily - 2-3 x weekly - 1-2 sets - 6-10 reps - 5-15 seconds hold  Plank on Knees - 1 x daily - 2-3 x weekly - 1-2 sets - 6-10 reps - 5-15 seconds hold        Access Code: [email or web address]  URL: [email or web address]  Date: 09/24/2021  Prepared by: Jacob Gallegos    Exercises  Push Up on Table - 1 x daily - 4-7 x weekly - 2-3 sets - 6-12 reps  Single Arm Scaption with Resistance - 1 x daily - 5-7 x weekly - 2-3 sets - 8-12 reps  Sidelying shoulder external rotation - patient took video of exercise  Supine W - patient took video of exercise             Therapeutic Activity:  0 minutes  *Reassessment performed today - time taken to update subjective goals, assess objective measures such as strength, ROM, balance, and discuss results of reassessment with patient. See objective measures above.     Gait: 0 minutes    Neuromuscular Re-Education:  0 minutes      Canalith Repositioning Procedure:      Manual Therapy:  0 minutes   Neutral Alignment Today. Focus of manual tx shifted to Left Shoulder   Joint mobs GR III, IV : posterior , inferior ; distraction   PROM to shoulder flexion, Abd, IR, ER  PNF - rhythmic stabilization with shoulder flexion, abduction, ER/IR  STM/gentle massage to L periscapular muscles and lateral shoulder   Gentle manual distraction of shoulder with MFR unwinding. Improved ROM at end of session    Lumbar rotation with overpressure - both directions. Unilateral PA mobs to lumbar spine - L1-5 bilaterally  Stretching into double knees to chest  Lumbar side gliding to L with overpressure from therapist      Manual traction   L hip inferior mobilization + passive stretching into hip flexion  L hip lateral mobilization + passive stretching into hip IR  PNF agonist reversal L hip flexion/extension  Lumbar lateral glides, hips to L w/overpressure from therapist  MET to promote pelvic alignment/ stabilization in supine, sidelying. Long axis distraction       Modalities: 0 minutes  DC Mechanical Txn. Progress to core and pelvic strength exercises. Mechanical lumbar traction, 15 minutes, Blue txn belts, 45 sec on/15 sec off, 57 kg pull on/28 kg pull off, pt LEs elevated on stool  Pt reported no increase in pain during activity. Felt stronger pull with blue belts. ASSESSMENT:    · Pt was given updated exercise program to perform at gym. Pt was also instructed to continue working on mobility exercises at home  · Will assess how pt responds to gym exercise program at next visit. Goals:   Patient stated goal:  \"I would just like to be able to have less pain and more movement\" - 80% progress for shoulder, 60% for lower back as of 10/22/21  [x]? Progressing: []? Met: []? Not Met: []? Adjusted     Therapist goals for Patient:   Short Term Goals: To be achieved in: 4 weeks  1.  Independent in HEP and progression per patient tolerance, in order to prevent re-injury. []? Progressing: [x]? Met: []? Not Met: []? Adjusted  2. Patient's will have a decrease in pain to 4/10 on average to facilitate improvement in movement, function, and ADLs as indicated by Functional Deficits. []? Progressing: [x]? Met: []? Not Met: []? Adjusted     Long Term Goals: To be achieved in: 8 weeks  1. Disability index score of 40% or less for the GISSELLE to assist with improving functional mobility and capacity. [x]? Progressing: []? Met: []? Not Met: []? Adjusted  2. Patient will demonstrate increased lumbar AROM to 100% WNL with no LE symptoms. [x]? Progressing: []? Met: []? Not Met: []? Adjusted  3. Patient will demonstrate an increase in NM recruitment/activation and overall GH and scapular strength to 4+/5 or WNL for proper functional mobility as indicated by patients Functional Deficits. []? Progressing:  [x]? Met: []? Not Met: []? Adjusted  4. Patient will complete a 20 minute strength training workout with minimal to no low back pain. 20-30% progress as of 10/22/21  [x]? Progressing: []? Met: []? Not Met: []? Adjusted    Overall Progression Towards Functional goals/ Treatment Progress Update:  [] Patient is progressing as expected towards functional goals listed. [x] Progression is slowed due to complexities/Impairments listed. [] Progression has been slowed due to co-morbidities.   [] Plan just implemented, too soon to assess goals progression <30days   [] Goals require adjustment due to lack of progress  [] Patient is not progressing as expected and requires additional follow up with physician  [] Other    Prognosis for POC: [x] Good [] Fair  [] Poor    Patient requires continued skilled intervention: [x] Yes  [] No    Treatment/Activity Tolerance:  [x] Patient able to complete treatment  [] Patient limited by fatigue  [] Patient limited by pain    [] Patient limited by other medical complications  [] Other:         PLAN: See eval  [x] Continue per plan of

## 2021-11-08 ENCOUNTER — APPOINTMENT (OUTPATIENT)
Dept: PHYSICAL THERAPY | Age: 41
End: 2021-11-08
Payer: OTHER GOVERNMENT

## 2021-11-12 ENCOUNTER — HOSPITAL ENCOUNTER (OUTPATIENT)
Dept: PHYSICAL THERAPY | Age: 41
Setting detail: THERAPIES SERIES
Discharge: HOME | End: 2021-11-12
Payer: OTHER GOVERNMENT

## 2021-11-12 NOTE — PROGRESS NOTES
Physical Therapy  Cancellation/No-show Note    Patient Name:  Keiko Arellano  :  1980   Date:  2021  Cancels to Date: 0  No-shows to Date: 2    For today's appointment patient:  []  Cancelled  []  Rescheduled appointment  [x]  No-show     Reason given by patient:  []  Patient ill  []  Conflicting appointment  []  No transportation    []  Conflict with work  []  No reason given  []  Other:     Comments:      Electronically signed by:  Rashad Mccullough [place], [address] [place] [address], [address] [facility] #[id number]

## 2021-11-15 ENCOUNTER — HOSPITAL ENCOUNTER (OUTPATIENT)
Dept: PHYSICAL THERAPY | Age: 41
Setting detail: THERAPIES SERIES
Discharge: HOME | End: 2021-11-15
Payer: OTHER GOVERNMENT

## 2021-11-15 PROCEDURE — 97110 THERAPEUTIC EXERCISES: CPT

## 2021-11-15 PROCEDURE — 97530 THERAPEUTIC ACTIVITIES: CPT

## 2021-11-15 NOTE — PROGRESS NOTES
Physical Therapy Daily Treatment Note    Patient was seen for 30 Visits of PT. Initial eval date 21. Pt progressed fair towards long term therapy goals meeting 1 of 4 goals. Plan to DC at this time, with recommendation to continue HEP as prepared. Please see attached treatment note for most recent status. Thank you for your referral of this patient.      Myranda Barney PT, DPT, [facility] #[id number]    Date:  11/15/2021    Patient Name:  Marichuy Hendricks    :  1980  MRN: [id number]      Medical/Treatment Diagnosis Information:  · Diagnosis: Acute pain of left shoulder (M25.512), chronic bilateral low back pain with bilateral sciatica (M54.42)  · Treatment Diagnosis: low back pain with sciatica, L shoulder pain with mobility deficits    Insurance/Certification information:  PT Insurance Information: , pre-auth required, $21 co-pay    Physician Information:  Referring Practitioner: Susanne Ramires DO    Plan of care signed :  [x]  Yes  [] No   []  Cosign [x]  Fax 9/3/21     Date of Patient follow up with Physician:     Is this a Progress Report:     [x]  Yes  [x]  No      If Yes:  Date Range for reporting period:  Beginning  10/22/21  Ending 11/15/21      Progress report will be due (10 Rx or 30 days whichever is less):  82/64/10   Recertification will be due (POC Duration  / 90 days whichever is less):  visit      Visit # Insurance Allowable Auth Required   10/11, ,  for new POC; 30 visits total 15 additional authorized on 9/3/21; 16 authorized [x]  Yes []  No        Functional Scale: GISSELLE      Date 6/7/21    7/9/21   7/19/21  8/30/2  10/22/21  Score: 27/50 = 54% disability  24/ 50 = 48%  26/50 = 52%   24/50 = 48% 22/50 = 44%    11/15/21  22/50 = 44% disability    Functional Scale: SPADI   7/19/21   8/30/21   10/22/21   Date 21      Score: 57/130 = 44% disability 67/130 = 52% disability 62/130 = 48% disability 23/130 = 29% disability    11/15/21  34/130 = 26% disability      Latex Allergy:  [x]NO      []YES  Preferred Language for Healthcare:   [x]English       []other:    RESTRICTIONS/PRECAUTIONS: previous hx of laminectomy     SUBJECTIVE:    · Shoulder has gotten much better, back has not improved very much. Does wish that his lower back would have improved more with therapy  · Would like to spend time that he was using in clinic to go to the gym       * sign for shoulder: picking up weighted items, horizontal abduction, abduction with arm at side, reaching overhead, especially with weighted items, putting hand behind his back    *sign for lower back: He reports that the shooting pain down his LEs is reproduced with bending forward to tie his shoes, turning/rotating to reach for items, and picking up items off of the ground    Pain level:  4/10 pain - lower back, 2/10 L shoulder    Plan Moving Forward/ For next visit:    Progress Left Shoulder AROM and Strength Ex   Improve lumbar mobility, especially into flexion        OBJECTIVE:            Lumbar flexion - moderate pain, 75% WNL  Lumbar extension - moderate pain, 75% WNL  Sidebending right - mild pain, 100% WNL    UE Range of Motion/Strength Testing      [x]? All ROM WFL except as marked below   [x]? All strength WFL (5/5) except as marked below    [x]? All myotomes WFL (5/5) except as marked below                 Range Tested MMT / Resisted PROM AROM Comments   *denotes pain Left Right Left Right Left Right     Shoulder Flexion  4+ 4+             Shoulder Extension                 Shoulder Abduction  C5  4+ 4             Shoulder Adduction                  Shoulder IR  5 5             Shoulder ER 5 5                     Exercises/Interventions:     Exercises in bold performed in department today. Items not bolded are carried forward from prior visits for continuity of the record.   Exercise/Equipment Resistance/Repetitions HEP Other comments   Sidelying sciatic nerve glide in R sidelying   2 min []    Supine sciatic nerve glide  1 min 15 sec [x]    Prone press-up   2 min; 1 min each with LE pointed directionally [x]    Bridging with trunk rotation 3 x 8 reps  2x20 [x] UEs extended, holding ball    Standing hip hinge 12 reps     Bridge with sustained abd  RTB 3x15     Bridge with sustained ADD (Ball) 2x20     Lower trunk rotation 1 min each direction     Lumbar roll stretch   1 min in L sidelying [x] Reviewed for HEP   Lumbar lateral glides   2 x 20-25 reps hips to L []    Recumbent bike   3 min, RPE 4-6/10 []    Elliptical  2 x 2 min  For warmup   SLR with contralateral arm extended  TA activation + supine marching   1 x 10 knees to chest, 2 x 10 holding 8# DB  2 x 12 _    Seated sciatic nerve glide  2 x 1 min _    Child's pose  Double knee to chest   Single knee to chest    2 min  3 x 30 sec   5 x 15 sec hold bilaterally _ Using towel to pull knee towards chest   Lumbar rotation dynamic stretch    1 min 30 sec _    _Standard plank on knees  Side plank   3 x 15 sec  6 reps each side _    Standing lumbar flexion with rotation 2 x 4 reps each direction     Seated lumbar flexion  2 min _ In pain-free ROM   Lumbar extension, torso on SB 2 x 8 reps     Forward lunge  Forward lunge with rotation 8 reps, holding 10# in L hand  10 reps, holding 3 kg ball _    Goblet squat with slow eccentric   2 x 12, holding 10# weight _    Figure 4 stretch with lumbar rotation  2 min _ Symptoms down L LE after 1 min 30 sec   Standing Hip Abduction  3x10   9#     Lateral step out/ squats with Glute squeeze 6 length of // bars  9#     Standing Hip Extension Taps    3x10  9# _    Mule Kick  3x10 9#     Suitcase carry 2 sets of 4 x 50 ft trips, 30# in bucket     Walking hamstring stretch (forward bending)  Standing forward bending 2 x 2 trips length of gym     Lifting bucket from elevated surface  Lifting box from elevated surface 3 x 10, 16-20# in bucket, 6\" box  2 x 12, 10# in box, on 6\" box     Standing trunk rotation   Standing diagonal chop 3 x 8 reps, 7.5#  2 x 8, 7.5#  In split stance   Standing Pallof Press 2 x 12, 5#  Using cable machine   Prone trunk extension 2 x 12  Partial ROM, no assist of hands   Treadmill 5 min at comfortable walking speed  For warmup   Standing lumbar extension 10 reps, 10 reps with hands on wall  Pt complaints of tightness/reduced ROM in lower back   SHOULDER COMPLEX      AAROM in supine x10     Sleeper stretch x10 5 sec hold     Mid Row  25# - 2 x 10   2x15  Using cable machine   Row Row  2x15     Scifit 4 min, lvl 2  2 x 1 min 30 sec, lvl 2. 5     Pulley 1 min flexion, 1 min abduction     Shoulder abduction isometric 3 x 1 min     Wall semi-circles 1 min each direction     Wall slides 1 min each - CW, CCW; 1# AW on wrist  2 x 1 min flexion, 1 min scaption     Bent over row 15# - 12 reps (RPE 7), 17.5# - 2 x 8-12  2 x 8-12 reps 20# in bucket  using cable machine   Sidelying shoulder abduction AMRAP with 2# - 15 reps, 5 RIR      Standing shoulder IR 10# - 17 reps, 12.5# - 2 x 8-12  On cable machine   Standing shoulder ER 5# - 10 reps (RPE 7), 7.5# - 9 reps (RPE 9)  On cable machine   's carry 3 rounds X 1 trip each arm 5# DB     Modified push up  Push up to table 1 min - 9 reps; 1 min - 3-4 reps  10 reps  Reviewed for HEP   OH Press 2 x 8-12, 4# DB in L UE  3 x 10, 7# on stick  Attempted, held due to catching in L shoulder   Standing scaption 2 x 6-10, 3# DB on L, 5# DB on R     Plank position L arm stationary, R arm moving - 2 x 20 sec     Landmine press  6# on stick - 24 reps (3 RIR), 9# on stick - 2 x 12     Shoulder horizontal abduction 2.5# - 2x8, 5# - 8 reps  Using cable machine   Sidelying shoulder flexion 3# - 7 reps (RIR 2),      Shoulder IR and extension AAROM 1 min, pulling towel behind back     Supine W's - moving into ER 1 min     Shoulder IR isometrics in 90/90 position 1 min     Sidelying shoulder ER/IR 2 x 8 reps, green theraband     [place] get up 3 x 8-10, 8#  Beginning of movement   DB Incline Bench by: Elenor Nikolay - 1 x daily - 2-3 x weekly - 2-3 sets - 6-12 reps  Standing Diagonal Chop - 1 x daily - 2-3 x weekly - 2-3 sets - 6-12 reps - 5-15 pounds  Lunge with Rotation and Medicine Ball - 1 x daily - 2-3 x weekly - 2-3 sets - 6-10 reps - 3-10 pounds  Dumbbell Squat at Shoulders - 1 x daily - 2-3 x weekly - 2-3 sets - 6-12 reps  Side Plank on Knees - 1 x daily - 2-3 x weekly - 1-2 sets - 6-10 reps - 5-15 seconds hold  Plank on Knees - 1 x daily - 2-3 x weekly - 1-2 sets - 6-10 reps - 5-15 seconds hold        Access Code: [email or web address]  URL: [email or web address]  Date: 09/24/2021  Prepared by: Caroline Crews    Exercises  Push Up on Table - 1 x daily - 4-7 x weekly - 2-3 sets - 6-12 reps  Single Arm Scaption with Resistance - 1 x daily - 5-7 x weekly - 2-3 sets - 8-12 reps  Sidelying shoulder external rotation - patient took video of exercise  Supine W - patient took video of exercise             Therapeutic Activity:  10 minutes  *Reassessment performed today - time taken to update subjective goals, assess objective measures such as strength, ROM, balance, and discuss results of reassessment with patient. See objective measures above. Gait: 0 minutes    Neuromuscular Re-Education:  0 minutes      Canalith Repositioning Procedure:      Manual Therapy:  0 minutes   Neutral Alignment Today. Focus of manual tx shifted to Left Shoulder   Joint mobs GR III, IV : posterior , inferior ; distraction   PROM to shoulder flexion, Abd, IR, ER  PNF - rhythmic stabilization with shoulder flexion, abduction, ER/IR  STM/gentle massage to L periscapular muscles and lateral shoulder   Gentle manual distraction of shoulder with MFR unwinding. Improved ROM at end of session    Lumbar rotation with overpressure - both directions.    Unilateral PA mobs to lumbar spine - L1-5 bilaterally  Stretching into double knees to chest  Lumbar side gliding to L with overpressure from therapist      Manual traction   L hip inferior mobilization + passive stretching into hip flexion  L hip lateral mobilization + passive stretching into hip IR  PNF agonist reversal L hip flexion/extension  Lumbar lateral glides, hips to L w/overpressure from therapist  MET to promote pelvic alignment/ stabilization in supine, sidelying. Long axis distraction       Modalities: 0 minutes  DC Mechanical Txn. Progress to core and pelvic strength exercises. Mechanical lumbar traction, 15 minutes, Blue txn belts, 45 sec on/15 sec off, 57 kg pull on/28 kg pull off, pt LEs elevated on stool  Pt reported no increase in pain during activity. Felt stronger pull with blue belts. ASSESSMENT:    Pt demonstrates ability to perform varied strengthening and cardiovascular activities in clinic with minimal to increase in his symptoms, which indicates that he is ready to start exercising independently to manage his condition. Pt is being discharged from our care today. Goals:   Patient stated goal:  \"I would just like to be able to have less pain and more movement\" - 90% progress for shoulder, 60% for lower back as of 10/22/21  [x]? Progressing: []? Met: []? Not Met: []? Adjusted     Therapist goals for Patient:   Short Term Goals: To be achieved in: 4 weeks  1. Independent in HEP and progression per patient tolerance, in order to prevent re-injury. []? Progressing: [x]? Met: []? Not Met: []? Adjusted  2. Patient's will have a decrease in pain to 4/10 on average to facilitate improvement in movement, function, and ADLs as indicated by Functional Deficits. []? Progressing: [x]? Met: []? Not Met: []? Adjusted     Long Term Goals: To be achieved in: 8 weeks  1. Disability index score of 40% or less for the GISSELLE to assist with improving functional mobility and capacity. []? Progressing: []? Met: [x]? Not Met:44% []? Adjusted  2. Patient will demonstrate increased lumbar AROM to 100% WNL with no LE symptoms. [x]? Progressing: []? Met: [x]? Not Met: []? Adjusted  3. Patient will demonstrate an increase in NM recruitment/activation and overall GH and scapular strength to 4+/5 or WNL for proper functional mobility as indicated by patients Functional Deficits. []? Progressing:  [x]? Met: []? Not Met: []? Adjusted  4. Patient will complete a 20 minute strength training workout with minimal to no low back pain. 20-30% progress as of 10/22/21   []? Progressing: []? Met: [x]? Not Met: []? Adjusted    Overall Progression Towards Functional goals/ Treatment Progress Update:  [] Patient is progressing as expected towards functional goals listed. [x] Progression is slowed due to complexities/Impairments listed. [] Progression has been slowed due to co-morbidities.   [] Plan just implemented, too soon to assess goals progression <30days   [] Goals require adjustment due to lack of progress  [] Patient is not progressing as expected and requires additional follow up with physician  [] Other    Prognosis for POC: [x] Good [] Fair  [] Poor    Patient requires continued skilled intervention: [x] Yes  [] No    Treatment/Activity Tolerance:  [x] Patient able to complete treatment  [] Patient limited by fatigue  [] Patient limited by pain    [] Patient limited by other medical complications  [] Other:         PLAN: See eval  [] Continue per plan of care [] Alter current plan (see comments above) - extend current POC for 11 additional visits (31 total visits approved)  [] Plan of care initiated [] Hold pending MD visit [x] Discharge        Therapeutic Exercise and NMR EXR  [x] (00678) Provided verbal/tactile cueing for activities related to strengthening, flexibility, endurance, ROM  for improvements in proximal strength and core control with self care, mobility, lifting and ambulation.  [] (27149) Provided verbal/tactile cueing for activities related to improving balance, coordination, kinesthetic sense, posture, motor skill, with most recent update on progress.

## 2023-02-23 ENCOUNTER — HOSPITAL ENCOUNTER (EMERGENCY)
Age: 43
Discharge: HOME | End: 2023-02-23
Attending: EMERGENCY MEDICINE
Payer: OTHER GOVERNMENT

## 2023-02-23 ENCOUNTER — APPOINTMENT (OUTPATIENT)
Dept: GENERAL RADIOLOGY | Age: 43
End: 2023-02-23
Payer: OTHER GOVERNMENT

## 2023-02-23 VITALS
DIASTOLIC BLOOD PRESSURE: 78 MMHG | TEMPERATURE: 97.8 F | BODY MASS INDEX: 39.2 KG/M2 | HEIGHT: 71 IN | HEART RATE: 70 BPM | SYSTOLIC BLOOD PRESSURE: 123 MMHG | WEIGHT: 280 LBS | RESPIRATION RATE: 18 BRPM | OXYGEN SATURATION: 95 %

## 2023-02-23 DIAGNOSIS — R07.81 RIB PAIN ON LEFT SIDE: Primary | ICD-10-CM

## 2023-02-23 DIAGNOSIS — S23.41XA SPRAIN OF COSTAL CARTILAGE, INITIAL ENCOUNTER: ICD-10-CM

## 2023-02-23 DIAGNOSIS — R03.0 ELEVATED BLOOD PRESSURE READING: ICD-10-CM

## 2023-02-23 PROCEDURE — 96374 THER/PROPH/DIAG INJ IV PUSH: CPT

## 2023-02-23 PROCEDURE — 6360000002 HC RX W HCPCS: Performed by: EMERGENCY MEDICINE

## 2023-02-23 PROCEDURE — 71101 X-RAY EXAM UNILAT RIBS/CHEST: CPT

## 2023-02-23 PROCEDURE — 6370000000 HC RX 637 (ALT 250 FOR IP): Performed by: EMERGENCY MEDICINE

## 2023-02-23 PROCEDURE — 99284 EMERGENCY DEPT VISIT MOD MDM: CPT

## 2023-02-23 RX ORDER — IBUPROFEN 800 MG/1
800 TABLET ORAL EVERY 8 HOURS PRN
Qty: 30 TABLET | Refills: 0 | Status: SHIPPED | OUTPATIENT
Start: 2023-02-23 | End: 2023-02-23 | Stop reason: SDUPTHER

## 2023-02-23 RX ORDER — METHOCARBAMOL 500 MG/1
1500 TABLET, FILM COATED ORAL 3 TIMES DAILY PRN
Qty: 45 TABLET | Refills: 0 | Status: SHIPPED | OUTPATIENT
Start: 2023-02-23 | End: 2023-02-28

## 2023-02-23 RX ORDER — KETOROLAC TROMETHAMINE 30 MG/ML
30 INJECTION, SOLUTION INTRAMUSCULAR; INTRAVENOUS ONCE
Status: COMPLETED | OUTPATIENT
Start: 2023-02-23 | End: 2023-02-23

## 2023-02-23 RX ORDER — LEVOTHYROXINE SODIUM 0.05 MG/1
50 TABLET ORAL
COMMUNITY
Start: 2022-11-01

## 2023-02-23 RX ORDER — METHOCARBAMOL 500 MG/1
1500 TABLET, FILM COATED ORAL ONCE
Status: COMPLETED | OUTPATIENT
Start: 2023-02-23 | End: 2023-02-23

## 2023-02-23 RX ORDER — METHOCARBAMOL 500 MG/1
1500 TABLET, FILM COATED ORAL 3 TIMES DAILY PRN
Qty: 45 TABLET | Refills: 0 | Status: SHIPPED | OUTPATIENT
Start: 2023-02-23 | End: 2023-02-23 | Stop reason: SDUPTHER

## 2023-02-23 RX ORDER — IBUPROFEN 800 MG/1
800 TABLET ORAL EVERY 8 HOURS PRN
Qty: 30 TABLET | Refills: 0 | Status: SHIPPED | OUTPATIENT
Start: 2023-02-23

## 2023-02-23 RX ORDER — OMEPRAZOLE 20 MG/1
20 CAPSULE, DELAYED RELEASE ORAL
COMMUNITY
Start: 2023-01-11

## 2023-02-23 RX ORDER — DIVALPROEX SODIUM 500 MG/1
TABLET, EXTENDED RELEASE ORAL
COMMUNITY
Start: 2022-08-21 | End: 2023-05-14

## 2023-02-23 RX ADMIN — METHOCARBAMOL TABLETS 1500 MG: 500 TABLET, COATED ORAL at 09:03

## 2023-02-23 RX ADMIN — KETOROLAC TROMETHAMINE 30 MG: 30 INJECTION, SOLUTION INTRAMUSCULAR; INTRAVENOUS at 09:03

## 2023-02-23 ASSESSMENT — PAIN SCALES - GENERAL
PAINLEVEL_OUTOF10: 7
PAINLEVEL_OUTOF10: 4

## 2023-02-23 ASSESSMENT — LIFESTYLE VARIABLES
HOW MANY STANDARD DRINKS CONTAINING ALCOHOL DO YOU HAVE ON A TYPICAL DAY: PATIENT DOES NOT DRINK
HOW OFTEN DO YOU HAVE A DRINK CONTAINING ALCOHOL: NEVER

## 2023-02-23 ASSESSMENT — PAIN - FUNCTIONAL ASSESSMENT: PAIN_FUNCTIONAL_ASSESSMENT: 0-10

## 2023-02-23 NOTE — DISCHARGE INSTRUCTIONS
Take muscle relaxer as prescribed be cautious this can make you fatigued do not drive or work while taking this medication. Take ibuprofen as prescribed you can alternate taking Tylenol every 4 hours as well. Placed a lidocaine patch on the area of pain. If you are not wearing a lidocaine patch she can alternate between heat and ice. Follow-up with your primary care doctor call today for an appointment. Return to the emergency department for any worsening symptoms such as worsening chest pain, shortness of breath, or vomiting. Follow up with primary care for blood pressure re-check as well.

## 2023-02-27 NOTE — ED PROVIDER NOTES
[address] ED      CHIEF COMPLAINT  Rib Pain (injury) (Upper left sided rib pain. Pt states he was coughing last night and heard a pop)       HISTORY OF PRESENT ILLNESS  Nesha Benites is a 38 y.o. male who presents to the ED complaining of left-sided rib pain. The patient states that he was coughing last night, when he heard a \"pop. \"  He states that he had immediate pain afterwards. The pain is sharp in his left lower ribs, but radiates as an ache. He has not taken anything for pain. He denies shortness of breath, just hurts to breathe. He states it hurts to cough or sneeze. He denies anterior chest pain. He states that he has a baseline cough, he has not been coughing more than usual.  He denies fevers or chills. He denies any abdominal pain or vomiting. No other complaints, modifying factors or associated symptoms. I have reviewed the following from the nursing documentation. History reviewed. No pertinent past medical history.   Past Surgical History:   Procedure Laterality Date    BACK SURGERY      laminectomy     Family History   Problem Relation Age of Onset    Mental Illness Mother      Social History     Socioeconomic History    Marital status:      Spouse name: Not on file    Number of children: 3    Years of education: Master's Degree    Highest education level: Not on file   Occupational History    Not on file   Tobacco Use    Smoking status: Never    Smokeless tobacco: Never    Tobacco comments:     ROSALINO   Vaping Use    Vaping Use: Unknown   Substance and Sexual Activity    Alcohol use: Not Currently     Comment: ROSALINO    Drug use: Not Currently     Types: Marijuana Arlen Rothman     Comment: for sleep, medical    Sexual activity: Not Currently     Comment: ROSALINO   Other Topics Concern    Not on file   Social History Narrative    Not on file     Social Determinants of Health     Financial Resource Strain: Not on file   Food Insecurity: Not on file   Transportation Needs: Not on file   Physical Activity: Not on file   Stress: Not on file   Social Connections: Not on file   Intimate Partner Violence: Not on file   Housing Stability: Not on file     No current facility-administered medications for this encounter. Current Outpatient Medications   Medication Sig Dispense Refill    divalproex (DEPAKOTE ER) 500 MG extended release tablet TAKE THREE TABLETS BY MOUTH AT BEDTIME      lurasidone (LATUDA) 40 MG TABS tablet 60 mg      omeprazole (PRILOSEC) 20 MG delayed release capsule 20 mg      levothyroxine (SYNTHROID) 50 MCG tablet 50 mcg      ibuprofen (ADVIL;MOTRIN) 800 MG tablet Take 1 tablet by mouth every 8 hours as needed for Pain 30 tablet 0    methocarbamol (ROBAXIN) 500 MG tablet Take 3 tablets by mouth 3 times daily as needed (rib pain) 45 tablet 0    lamoTRIgine (LAMICTAL) 25 MG tablet Take 2 tablets by mouth every evening 60 tablet 0    QUEtiapine (SEROQUEL) 100 MG tablet Take 1 tablet by mouth nightly as needed (insomnia) (Patient not taking: Reported on 2/23/2023) 20 tablet 1    lamoTRIgine (LAMICTAL) 200 MG tablet Take 1 tablet by mouth every evening 30 tablet 2     No Known Allergies    REVIEW OF SYSTEMS  10 systems reviewed, pertinent positives per HPI otherwise noted to be negative. PHYSICAL EXAM  /78   Pulse 70   Temp 97.8 °F (36.6 °C)   Resp 18   Ht 5' 11\" (1.803 m)   Wt 280 lb (127 kg)   SpO2 95%   BMI 39.05 kg/m²    Physical exam:  General appearance: awake and cooperative. Moderate pain distress. Non toxic appearing. Skin: Warm and dry. No acute rashes. HENT: Normocephalic. Atraumatic. Mucus membranes are moist. No drooling or stridor. Neck: supple  Eyes: JOSE. EOM intact. Heart: RRR. No murmurs. Lungs: Respirations unlabored. CTAB. No wheezes, rales, or rhonchi. Good air exchange  Abdomen: No tenderness. Bowel sounds normoactive. Soft. Non distended. No peritoneal signs.    Musculoskeletal: point tenderness to palpation left lower lateral ribcage; no ecchymosis or crepitus. No extremity edema. Compartments soft. No deformity. No tenderness in the extremities. All extremities neurovascularly intact. Radial, Dp, and PT pulses +2/4 bilaterally  Neurological: Alert and oriented. No focal deficits. No aphasia or dysarthria. No gait ataxia. Psychiatric: Normal mood and affect. LABS  I have reviewed all labs for this visit. No results found for this visit on 02/23/23. ECG      Radiology: The following plain film was independently interpreted by myself:  Chest: was negative for rib fracture, infiltrate, effusion, pneumothorax, or wide mediastinum  All other non-plain film images (e.g., CT Scan, Ultrasound) have been interpreted by the on-call radiologist.      RADIOLOGY  XR RIBS LEFT INCLUDE CHEST (MIN 3 VIEWS)    Result Date: 2/23/2023  EXAMINATION: 5 XRAY VIEWS OF THE LEFT RIBS WITH FRONTAL XRAY VIEW OF THE CHEST 2/23/2023 8:31 am COMPARISON: None available. HISTORY: ORDERING SYSTEM PROVIDED HISTORY: pain TECHNOLOGIST PROVIDED HISTORY: Reason for exam:->pain Reason for Exam: rib pain after coughing last night FINDINGS: The lungs are clear. The cardiac silhouette is within normal limits. There is no pneumothorax or pleural effusion. There is no acute displaced left rib fracture. 1.  No acute abnormality. No results found. Is this patient to be included in the SEP-1 Core Measure due to severe sepsis or septic shock? No   Exclusion criteria - the patient is NOT to be included for SEP-1 Core Measure due to: Infection is not suspected        ED COURSE/MDM  Patient seen and evaluated. Old records reviewed. Labs and imaging reviewed and results discussed with patient. This is a 62-year-old male presenting for evaluation of left rib pain. Vital signs show hypertension. Otherwise he is not tachycardic or hypoxic. He is breathing comfortably.   On evaluation, the patient does appear to be in moderate pain but is nontoxic-appearing and in no distress. He has not taken anything for pain, he is given Toradol and Robaxin here. X-ray including rib series shows no evidence of rib fracture. There also is no appearance of lung abnormality such as pneumothorax, infiltrate, or pleural effusion. The patient feels significantly improved after medications given here. He likely is experiencing a musculoskeletal injury due to coughing, I did not feel additional imaging or work-up was indicated at this time and the patient is agreeable to this. He is comfortable with discharge home. We discussed pain control and he is given a prescription for Robaxin he also has lidocaine patches at home. He is to follow-up with primary care. We discussed strict return precautions back to the ED and he voices understanding. During the patient's ED course, the patient was given:  Medications   ketorolac (TORADOL) injection 30 mg (30 mg IntraVENous Given 2/23/23 0903)   methocarbamol (ROBAXIN) tablet 1,500 mg (1,500 mg Oral Given 2/23/23 0903)        CLINICAL IMPRESSION  1. Rib pain on left side    2. Sprain of costal cartilage, initial encounter    3. Elevated blood pressure reading        Blood pressure 123/78, pulse 70, temperature 97.8 °F (36.6 °C), resp. rate 18, height 5' 11\" (1.803 m), weight 280 lb (127 kg), SpO2 95 %. Patient was given scripts for the following medications. I counseled patient how to take these medications. Discharge Medication List as of 2/23/2023 11:05 AM          Follow-up with:  DO Jamilah Freeman [address] [place] [id number]  [phone]    Call today      DISCLAIMER: This chart was created using Dragon dictation software. Efforts were made by me to ensure accuracy, however some errors may be present due to limitations of this technology and occasionally words are not transcribed correctly.        Ashley Yee  02/27/23 0172

## 2024-06-19 NOTE — PROGRESS NOTES
Occupational Therapy  Facility/Department: [address] ONC/MED SURG  Occupational Therapy Initial Assessment    Name: Vincenzo Darden  : 1962  MRN: [id number]  Date of Service: 2024  Chief Complaint   Patient presents with    Abnormal Labs       Discharge Recommendations:  Patient would benefit from continued therapy after discharge  OT Equipment Recommendations  Equipment Needed: No       Patient Diagnosis(es): The primary encounter diagnosis was Acute kidney injury superimposed on CKD (HCC). Diagnoses of PAD (peripheral artery disease) (HCC), Wound eschar of foot, Wound, open, foot, left, initial encounter, and Ulcer of right foot, unspecified ulcer stage ([facility]) were also pertinent to this visit.  Past Medical History:  has a past medical history of Diabetes mellitus (HCC) and Hernia, abdominal.  Past Surgical History:  has a past surgical history that includes Cardiac procedure (N/A, 10/09/2023); Cardiac procedure (N/A, 10/09/2023); Coronary artery bypass graft (N/A, 2024); and IR NONTUNNELED VASCULAR CATHETER > 5 YEARS (2024).           Assessment   Performance deficits / Impairments: Decreased functional mobility ;Decreased ADL status;Decreased endurance;Decreased balance;Decreased high-level IADLs;Decreased safe awareness  Assessment: Pt would continue to benefit from OT services to address deficits listed above and improve overall funcotional performance prior to discharge.  Prognosis: Good  Decision Making: Medium Complexity  REQUIRES OT FOLLOW-UP: Yes  Activity Tolerance  Activity Tolerance: Patient Tolerated treatment well        Plan   Occupational Therapy Plan  Times Per Week: 3x/wk  Current Treatment Recommendations: Balance training, Functional mobility training, Endurance training, Pain management, Safety education & training, Patient/Caregiver education & training, Equipment evaluation, education, & procurement, Self-Care / ADL, Home management training  Physical Therapy Daily Treatment Note/Progress Note    Patient was seen for 27 Visits of PT. Initial eval date 21. Pt has progressed well towards short term therapy goals meeting 1 of 2 goals. Pt has progressed  fair towards long term therapy goals meeting 1 of 4 goals. Plan to continue per POC, to continue progress toward stated goals. Please see attached treatment note for most recent status. Thank you for your referral of this patient.      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)  · Treatment Diagnosis: low back pain with sciatica, L shoulder pain with mobility deficits    Insurance/Certification information:  PT Insurance Information: , pre-auth required, $21 co-pay    Physician Information:  Referring Practitioner: Christelle Brennan DO    Plan of care signed :  [x]  Yes  [] No   []  Cosign [x]  Fax 9/3/21     Date of Patient follow up with Physician:     Is this a Progress Report:     []  Yes  [x]  No      If Yes:  Date Range for reporting period:  Beginning   Ending       Progress report will be due (10 Rx or 30 days whichever is less):        Recertification will be due (POC Duration  / 90 days whichever is less):  visit      Visit # Insurance Allowable Auth Required   , ,  for new POC; 27 visits total 15 additional authorized on 9/3/21; 16 authorized [x]  Yes []  No        Functional Scale: GISSELLE      Date 6/7/21    7/9/21   7/19/21  8/30/21  10/22/21  Score: 27/50 = 54% disability  24/ 50 = 48%  26/50 = 52%   24/50 = 48%  22/50 = 44%    Functional Scale: SPADI   7/19/21   8/30/21   10/22/21   Date 21      Score: 57/130 = 44% disability 67/130 = 52% disability 62/130 = 48% disability 23/130 = 29% disability      Latex Allergy:  [x]NO      []YES  Preferred Language for Healthcare:   [x]English       []other:    RESTRICTIONS/PRECAUTIONS: previous hx of laminectomy     SUBJECTIVE:    What has changed over the past month? · For lower back: \"Not a whole lot, has stayed about the same. \"  · For L shoulder: Shoulder has gotten better. More ROM, less pain with movement  · Still having the same symptoms in his lower back and into his LEs, L worse than R.     * sign for shoulder: picking up weighted items, horizontal abduction, abduction with arm at side, reaching overhead, especially with weighted items, putting hand behind his back    *sign for lower back: He reports that the shooting pain down his LEs is reproduced with bending forward to tie his shoes, turning/rotating to reach for items, and picking up items off of the ground    Pain level:  4-5/10 pain - lower back, 2/10 L shoulder    Plan Moving Forward/ For next visit:    Progress Left Shoulder AROM and Strength Ex   Improve lumbar mobility, especially into flexion        OBJECTIVE:     Functional movements:  · Lifting box from ground: mild increase in pain   · Putting shoes on: painful, difficulty bending forward    Lumbar AROM:  Lumbar flexion - 25% limitation, caused LE symptoms  Lumbar extension - 50% limitation, no LE symptoms  Sidebending right - 25% limitation   Sidebending left -   Rotation R - WNL  Rotation L - WNL  Sidegliding, hips to L - 25% limitation    Shoulder AROM:    L shoulder AROM WNL    Shoulder MMT:  L shoulder MMT 4+/5    Exercises/Interventions:     Exercises in bold performed in department today. Items not bolded are carried forward from prior visits for continuity of the record.   Exercise/Equipment Resistance/Repetitions HEP Other comments   Sidelying sciatic nerve glide in R sidelying   2 min []    Supine sciatic nerve glide  1 min 15 sec [x]    Prone press-up   2 min; 1 min each with LE pointed directionally [x]    Bridging with trunk rotation 3 x 8 reps  2x20 [x] UEs extended, holding ball    Standing hip hinge 12 reps     Bridge with sustained abd  RTB 3x15     Bridge with sustained ADD (Ball) 2x20     Lower trunk rotation 2 min     Lumbar roll stretch   1 min in L sidelying [x] Reviewed for HEP   Lumbar lateral glides   3 x 20-25 reps hips to L []    Recumbent bike   3 min, RPE 4-6/10 []    Elliptical  3 x 1 min  For warmup   SLR with contralateral arm extended  TA activation + supine marching   1 x 10 knees to chest, 2 x 10 holding 8# DB  2 x 12 _    Seated sciatic nerve glide  2 x 1 min _    Child's pose  Double knee to chest   Single knee to chest    2 min  3 x 30 sec   5 x 15 sec hold bilaterally _ Using towel to pull knee towards chest   Lumbar rotation dynamic stretch    1 min 30 sec _    _Standard plank on knees  Side plank   3 x 15 sec  6 reps each side _    Standing lumbar flexion 1 min  Walking hands down legs, increased pain with activity   Seated lumbar flexion  2 min _ In pain-free ROM   Forward lunge with rotation 10 reps, holding 3 kg ball _    Goblet squat with slow eccentric   2 x 12, holding 10# weight _    Figure 4 stretch with lumbar rotation  2 min _ Symptoms down L LE after 1 min 30 sec   Standing Hip Abduction  3x10   9#     Lateral step out/ squats with Glute squeeze 6 length of // bars  9#     Standing Hip Extension Taps    3x10  9# _    Mule Kick  3x10 9#     Suitcase carry 2 sets of 4 x 50 ft trips, 30# in bucket     Walking hamstring stretch (forward bending)  Standing forward bending 2 x 2 trips length of gym     Lifting bucket from elevated surface  Lifting box from elevated surface 3 x 10, 16-20# in bucket, 6\" box  2 x 10, 10# in box, on 6\" box     Standing trunk rotation   Standing diagonal chop 3 x 8 reps, 7.5#  2 x 10, 7.5#  In split stance   Standing Pallof Press 2 x 12, 5#  Using cable machine   Prone trunk extension 2 x 12  Partial ROM, no assist of hands   Treadmill 5 min at comfortable walking speed For warmup   Standing lumbar extension 10 reps, 10 reps with hands on wall  Pt complaints of tightness/reduced ROM in lower back   SHOULDER COMPLEX      AAROM in supine x10     Sleeper stretch x10 5 sec hold     Mid Row  25# - 2 x 10   2x15  Using cable machine   Row Row  2x15     Scifit 4 min, lvl 2  2 x 1 min 30 sec, lvl 2. 5     Pulley 1 min flexion, 1 min abduction     Shoulder abduction isometric 3 x 1 min     Wall semi-circles 1 min each direction     Wall slides 1 min each - CW, CCW; 1# AW on wrist  2 x 1 min flexion, 1 min scaption     Bent over row 15# - 12 reps (RPE 7), 17.5# - 2 x 8-12  2 x 8-12 reps 20# in bucket  using cable machine   Sidelying shoulder abduction AMRAP with 2# - 15 reps, 5 RIR      Standing shoulder IR 10# - 17 reps, 12.5# - 2 x 8-12  On cable machine   Standing shoulder ER 5# - 10 reps (RPE 7), 7.5# - 9 reps (RPE 9)  On cable machine   's carry 3 rounds X 1 trip each arm 5# DB     Modified push up  Push up to table 1 min - 9 reps; 1 min - 3-4 reps  10 reps  Reviewed for HEP   OH Press 2 x 8-12, 4# DB in L UE  3 x 10, 7# on stick  Attempted, held due to catching in L shoulder   Standing scaption 2 x 6-10, 3# DB on L, 5# DB on R     Plank position L arm stationary, R arm moving - 2 x 20 sec     Landmine press  6# on stick - 24 reps (3 RIR), 9# on stick - 2 x 12     Shoulder horizontal abduction 2.5# - 2x8, 5# - 8 reps  Using cable machine   Sidelying shoulder flexion 3# - 7 reps (RIR 2),      Shoulder IR and extension AAROM 1 min, pulling towel behind back     Supine W's - moving into ER 1 min     Shoulder IR isometrics in 90/90 position 1 min     Sidelying shoulder ER/IR 2 x 8 reps, green theraband     [place] [employer] [place] get up 3 x 8-10, 8#  Beginning of movement   DB Incline Bench Press 3 x 12-15, 8-10# DBs     Standing shoulder abduction      Prone shoulder flexion 3 x 10     Standing closed chain shoulder extension 1 min 30 sec  [facility]     Therapeutic Exercise/Home Exercise Program:   20 minutes  Pt inst in role of PT, prognosis, plan of care, use of CP/HP, activity modification, and benefits of therapy. HEP has been established, See above, pt given handout(s) of new exercises. Updated HEP given to pt today, reviewed with patient at end of session. See HEP exercises below. *Reassessment performed today, see above. Therapist and pt discussed strategies for HEP performance (what activities have been most beneficial, when to perform exercises throughout the day, how often to perform exercise) at length to increase compliance with HEP and to determine best plan of action to help pt continue making functional progress. Pt and therapist discussed and developed exercise program for pt to perform at the gym. Access Code: [email or web address]  URL: [email or web address]  Date: 10/08/2021  Prepared by: Logan Miller    Exercises  Supine Figure 4 Piriformis Stretch - 1 x daily - 5-7 x weekly - 2-4 sets - 5-8 reps - 5-10 seconds hold  Dumbbell Squat at Shoulders - 1 x daily - 2-3 x weekly - 3 sets - 10 reps  Supine Double Knee to Chest - 1 x daily - 3-4 x weekly - 6-10 reps - 30 seconds hold  Plank on Knees - 1 x daily - 4-5 x weekly - 3-8 reps - 15 seconds hold  Lunge with Rotation and Medicine Ball - 1 x daily - 3-5 x weekly - 2-3 sets - 8-12 reps        Access Code: [email or web address]  URL: [email or web address]  Date: 09/24/2021  Prepared by: Logan Miller    Exercises  Push Up on Table - 1 x daily - 4-7 x weekly - 2-3 sets - 6-12 reps  Single Arm Scaption with Resistance - 1 x daily - 5-7 x weekly - 2-3 sets - 8-12 reps  Sidelying shoulder external rotation - patient took video of exercise  Supine W - patient took video of exercise           Therapeutic Activity:  10 minutes  *Reassessment performed today - time taken to update subjective goals, assess objective measures such as strength, ROM, balance, and discuss results of reassessment with patient.  See objective measures above. Gait: 0 minutes    Neuromuscular Re-Education:  0 minutes      Canalith Repositioning Procedure:      Manual Therapy:  15 minutes   Neutral Alignment Today. Focus of manual tx shifted to Left Shoulder   Joint mobs GR III, IV : posterior , inferior ; distraction   PROM to shoulder flexion, Abd, IR, ER  PNF - rhythmic stabilization with shoulder flexion, abduction, ER/IR  STM/gentle massage to L periscapular muscles and lateral shoulder   Gentle manual distraction of shoulder with MFR unwinding. Improved ROM at end of session    Lumbar rotation with overpressure - both directions. Unilateral PA mobs to lumbar spine - L1-5 bilaterally      Manual traction   L hip inferior mobilization + passive stretching into hip flexion  L hip lateral mobilization + passive stretching into hip IR  PNF agonist reversal L hip flexion/extension  Lumbar lateral glides, hips to L w/overpressure from therapist  MET to promote pelvic alignment/ stabilization in supine, sidelying. Long axis distraction       Modalities: 0 minutes  DC Mechanical Txn. Progress to core and pelvic strength exercises. Mechanical lumbar traction, 15 minutes, Blue txn belts, 45 sec on/15 sec off, 57 kg pull on/28 kg pull off, pt LEs elevated on stool  Pt reported no increase in pain during activity. Felt stronger pull with blue belts. ASSESSMENT:    · L shoulder mobility and strength is now WNL  · Most noticeable AROM deficits with lumbar flexion and extension  · Responded well with repeated stretching into lumbar rotation      Goals:   Patient stated goal:  \"I would just like to be able to have less pain and more movement\" - 80% progress for shoulder, 60% for lower back as of 10/22/21  [x]? Progressing: []? Met: []? Not Met: []? Adjusted     Therapist goals for Patient:   Short Term Goals: To be achieved in: 4 weeks  1. Independent in HEP and progression per patient tolerance, in order to prevent re-injury. []? Progressing: [x]? extend current POC for 11 additional visits (31 total visits approved)  [] Plan of care initiated [] Hold pending MD visit [] Discharge        Therapeutic Exercise and NMR EXR  [x] (83657) Provided verbal/tactile cueing for activities related to strengthening, flexibility, endurance, ROM  for improvements in proximal strength and core control with self care, mobility, lifting and ambulation.  [] (94592) Provided verbal/tactile cueing for activities related to improving balance, coordination, kinesthetic sense, posture, motor skill, proprioception  to assist with core control in self care, mobility, lifting, and ambulation.      Therapeutic Activities and Gait:    [x] (79390 or 31259) Provided verbal/tactile cueing for activities related to improving balance, coordination, kinesthetic sense, posture, motor skill, proprioception and motor activation to allow for proper function  with self care and ADLs  [] (53790) Provided training and instruction to the patient for proper core and proximal hip recruitment and positioning with ambulation re-education     Home Exercise Program:    [x] (37597) Reviewed/Progressed HEP activities related to strengthening, flexibility, endurance, ROM of core, proximal hip and LE for functional self-care, mobility, lifting and ambulation   [] (32003) Reviewed/Progressed HEP activities related to improving balance, coordination, kinesthetic sense, posture, motor skill, proprioception of core, proximal hip and LE for self care, mobility, lifting, and ambulation      Manual Treatments:  PROM / STM / Oscillations-Mobs:  G-I, II, III, IV (PA's, Inf., Post.)  [x] (02475) Provided manual therapy to mobilize proximal hip and LS spine soft tissue/joints for the purpose of modulating pain, promoting relaxation,  increasing ROM, reducing/eliminating soft tissue swelling/inflammation/restriction, improving soft tissue extensibility and allowing for proper ROM for normal function with self care, mobility, lifting and ambulation. Modalities:       Charges:  Timed Code Treatment Minutes: 45   Total Treatment Minutes: 45         [] EVAL  [x] SE(12900) x  1  [] IONTO  [] NMR (85468) x     [] VASO  [x] Manual (91333) x 1     [] Other:  [x] TA x 1    [] Gait x   [] Mech Traction (95721)  [] ES(attended) (17437)     [] ES (un) (49091):       Electronically signed by: Wilfrid Garcia PT, DPT, [facility] #[id number]        Note: If patient does not return for scheduled/ recommended follow up visits, this note will serve as a discharge from care along with most recent update on progress.

## 2025-05-06 NOTE — GROUP NOTE
Group Therapy Note    Date: 7/23/2020    Group Start Time:  8:30 AM  Group End Time:  9:45 AM  Group Topic: Psychotherapy    [facility] [place], [facility]    Group Therapy Note    Attendees: 4    Patient shared and set a goal at the beginning of group to practice a new way of being in group today. Notes:  Pt set goal to \"engage and learn\". Pt was quiet during group with flat affect and participated as he offered supportive feedback to peer during group. Status After Intervention:  Improved    Participation Level:  Active Listener and Interactive    Participation Quality: Appropriate and Attentive      Speech:  normal      Thought Process/Content: Logical  Linear      Affective Functioning: Flat and Constricted/Restricted      Mood: depressed      Level of consciousness:  Alert and Oriented x4      Response to Learning: Able to verbalize current knowledge/experience, Able to verbalize/acknowledge new learning and Able to retain information      Endings: None Reported    Modes of Intervention: Education, Support, Socialization, Exploration, Clarifying and Problem-solving      Discipline Responsible: /Counselor      Signature:  LAZ Dominguez, BENIGNO Patient
